# Patient Record
Sex: FEMALE | Race: WHITE | NOT HISPANIC OR LATINO | Employment: FULL TIME | URBAN - METROPOLITAN AREA
[De-identification: names, ages, dates, MRNs, and addresses within clinical notes are randomized per-mention and may not be internally consistent; named-entity substitution may affect disease eponyms.]

---

## 2017-03-12 ENCOUNTER — APPOINTMENT (OUTPATIENT)
Dept: ULTRASOUND IMAGING | Facility: CLINIC | Age: 42
End: 2017-03-12
Attending: EMERGENCY MEDICINE
Payer: COMMERCIAL

## 2017-03-12 ENCOUNTER — HOSPITAL ENCOUNTER (OUTPATIENT)
Facility: CLINIC | Age: 42
Setting detail: OBSERVATION
Discharge: HOME | End: 2017-03-13
Attending: EMERGENCY MEDICINE | Admitting: FAMILY MEDICINE
Payer: COMMERCIAL

## 2017-03-12 ENCOUNTER — APPOINTMENT (OUTPATIENT)
Dept: CT IMAGING | Facility: CLINIC | Age: 42
End: 2017-03-12
Attending: EMERGENCY MEDICINE
Payer: COMMERCIAL

## 2017-03-12 DIAGNOSIS — R11.2 INTRACTABLE VOMITING WITH NAUSEA, UNSPECIFIED VOMITING TYPE: ICD-10-CM

## 2017-03-12 DIAGNOSIS — A08.4 VIRAL GASTROENTERITIS: Primary | ICD-10-CM

## 2017-03-12 DIAGNOSIS — A41.9 SEPSIS, DUE TO UNSPECIFIED ORGANISM: ICD-10-CM

## 2017-03-12 DIAGNOSIS — K80.20 CALCULUS OF GALLBLADDER WITHOUT CHOLECYSTITIS WITHOUT OBSTRUCTION: ICD-10-CM

## 2017-03-12 DIAGNOSIS — R50.9 FEVER, UNSPECIFIED: ICD-10-CM

## 2017-03-12 LAB
ALBUMIN SERPL-MCNC: 3.7 G/DL (ref 3.4–5)
ALBUMIN UR-MCNC: 10 MG/DL
ALP SERPL-CCNC: 77 U/L (ref 40–150)
ALT SERPL W P-5'-P-CCNC: 31 U/L (ref 0–50)
ANION GAP SERPL CALCULATED.3IONS-SCNC: 8 MMOL/L (ref 3–14)
APPEARANCE UR: CLEAR
AST SERPL W P-5'-P-CCNC: 16 U/L (ref 0–45)
BACTERIA #/AREA URNS HPF: ABNORMAL /HPF
BASOPHILS # BLD AUTO: 0 10E9/L (ref 0–0.2)
BASOPHILS NFR BLD AUTO: 0.1 %
BILIRUB DIRECT SERPL-MCNC: 0.1 MG/DL (ref 0–0.2)
BILIRUB SERPL-MCNC: 1.2 MG/DL (ref 0.2–1.3)
BILIRUB UR QL STRIP: NEGATIVE
BUN SERPL-MCNC: 18 MG/DL (ref 7–30)
CALCIUM SERPL-MCNC: 8.6 MG/DL (ref 8.5–10.1)
CHLORIDE SERPL-SCNC: 103 MMOL/L (ref 94–109)
CO2 SERPL-SCNC: 25 MMOL/L (ref 20–32)
COLOR UR AUTO: YELLOW
CREAT SERPL-MCNC: 0.56 MG/DL (ref 0.52–1.04)
DIFFERENTIAL METHOD BLD: ABNORMAL
EOSINOPHIL # BLD AUTO: 0.1 10E9/L (ref 0–0.7)
EOSINOPHIL NFR BLD AUTO: 0.4 %
ERYTHROCYTE [DISTWIDTH] IN BLOOD BY AUTOMATED COUNT: 12.6 % (ref 10–15)
GFR SERPL CREATININE-BSD FRML MDRD: ABNORMAL ML/MIN/1.7M2
GLUCOSE SERPL-MCNC: 112 MG/DL (ref 70–99)
GLUCOSE UR STRIP-MCNC: NEGATIVE MG/DL
HCT VFR BLD AUTO: 47 % (ref 35–47)
HGB BLD-MCNC: 15.4 G/DL (ref 11.7–15.7)
HGB UR QL STRIP: NEGATIVE
IMM GRANULOCYTES # BLD: 0 10E9/L (ref 0–0.4)
IMM GRANULOCYTES NFR BLD: 0.2 %
KETONES UR STRIP-MCNC: NEGATIVE MG/DL
LACTATE BLD-SCNC: 2.5 MMOL/L (ref 0.7–2.1)
LACTATE BLD-SCNC: 2.7 MMOL/L (ref 0.7–2.1)
LEUKOCYTE ESTERASE UR QL STRIP: ABNORMAL
LIPASE SERPL-CCNC: 82 U/L (ref 73–393)
LYMPHOCYTES # BLD AUTO: 0.3 10E9/L (ref 0.8–5.3)
LYMPHOCYTES NFR BLD AUTO: 2.6 %
MCH RBC QN AUTO: 28.3 PG (ref 26.5–33)
MCHC RBC AUTO-ENTMCNC: 32.8 G/DL (ref 31.5–36.5)
MCV RBC AUTO: 86 FL (ref 78–100)
MONOCYTES # BLD AUTO: 0.3 10E9/L (ref 0–1.3)
MONOCYTES NFR BLD AUTO: 2.5 %
MUCOUS THREADS #/AREA URNS LPF: PRESENT /LPF
NEUTROPHILS # BLD AUTO: 10.8 10E9/L (ref 1.6–8.3)
NEUTROPHILS NFR BLD AUTO: 94.2 %
NITRATE UR QL: NEGATIVE
PH UR STRIP: 6 PH (ref 5–7)
PLATELET # BLD AUTO: 171 10E9/L (ref 150–450)
POTASSIUM SERPL-SCNC: 4 MMOL/L (ref 3.4–5.3)
PROT SERPL-MCNC: 7.3 G/DL (ref 6.8–8.8)
RBC # BLD AUTO: 5.45 10E12/L (ref 3.8–5.2)
RBC #/AREA URNS AUTO: 1 /HPF (ref 0–2)
SODIUM SERPL-SCNC: 136 MMOL/L (ref 133–144)
SP GR UR STRIP: 1.02 (ref 1–1.03)
SQUAMOUS #/AREA URNS AUTO: 7 /HPF (ref 0–1)
URN SPEC COLLECT METH UR: ABNORMAL
UROBILINOGEN UR STRIP-MCNC: NORMAL MG/DL (ref 0–2)
WBC # BLD AUTO: 11.4 10E9/L (ref 4–11)
WBC #/AREA URNS AUTO: 1 /HPF (ref 0–2)

## 2017-03-12 PROCEDURE — 96374 THER/PROPH/DIAG INJ IV PUSH: CPT

## 2017-03-12 PROCEDURE — 25000128 H RX IP 250 OP 636: Performed by: EMERGENCY MEDICINE

## 2017-03-12 PROCEDURE — 81001 URINALYSIS AUTO W/SCOPE: CPT | Performed by: EMERGENCY MEDICINE

## 2017-03-12 PROCEDURE — 25800025 ZZH RX 258: Performed by: EMERGENCY MEDICINE

## 2017-03-12 PROCEDURE — 96375 TX/PRO/DX INJ NEW DRUG ADDON: CPT

## 2017-03-12 PROCEDURE — 96376 TX/PRO/DX INJ SAME DRUG ADON: CPT

## 2017-03-12 PROCEDURE — 85025 COMPLETE CBC W/AUTO DIFF WBC: CPT | Performed by: FAMILY MEDICINE

## 2017-03-12 PROCEDURE — 25000128 H RX IP 250 OP 636: Performed by: FAMILY MEDICINE

## 2017-03-12 PROCEDURE — 99285 EMERGENCY DEPT VISIT HI MDM: CPT | Mod: 25

## 2017-03-12 PROCEDURE — 74176 CT ABD & PELVIS W/O CONTRAST: CPT

## 2017-03-12 PROCEDURE — 83605 ASSAY OF LACTIC ACID: CPT | Performed by: EMERGENCY MEDICINE

## 2017-03-12 PROCEDURE — 76705 ECHO EXAM OF ABDOMEN: CPT

## 2017-03-12 PROCEDURE — 96361 HYDRATE IV INFUSION ADD-ON: CPT

## 2017-03-12 PROCEDURE — 83690 ASSAY OF LIPASE: CPT | Performed by: FAMILY MEDICINE

## 2017-03-12 PROCEDURE — 80048 BASIC METABOLIC PNL TOTAL CA: CPT | Performed by: FAMILY MEDICINE

## 2017-03-12 PROCEDURE — 99285 EMERGENCY DEPT VISIT HI MDM: CPT | Performed by: EMERGENCY MEDICINE

## 2017-03-12 PROCEDURE — 80076 HEPATIC FUNCTION PANEL: CPT | Performed by: FAMILY MEDICINE

## 2017-03-12 RX ORDER — PROMETHAZINE HYDROCHLORIDE 25 MG/ML
25 INJECTION, SOLUTION INTRAMUSCULAR; INTRAVENOUS ONCE
Status: COMPLETED | OUTPATIENT
Start: 2017-03-12 | End: 2017-03-12

## 2017-03-12 RX ORDER — ONDANSETRON 2 MG/ML
8 INJECTION INTRAMUSCULAR; INTRAVENOUS ONCE
Status: COMPLETED | OUTPATIENT
Start: 2017-03-12 | End: 2017-03-12

## 2017-03-12 RX ORDER — ONDANSETRON 2 MG/ML
4 INJECTION INTRAMUSCULAR; INTRAVENOUS ONCE
Status: COMPLETED | OUTPATIENT
Start: 2017-03-12 | End: 2017-03-12

## 2017-03-12 RX ORDER — KETOROLAC TROMETHAMINE 30 MG/ML
30 INJECTION, SOLUTION INTRAMUSCULAR; INTRAVENOUS ONCE
Status: COMPLETED | OUTPATIENT
Start: 2017-03-12 | End: 2017-03-12

## 2017-03-12 RX ADMIN — KETOROLAC TROMETHAMINE 30 MG: 30 INJECTION, SOLUTION INTRAMUSCULAR at 20:07

## 2017-03-12 RX ADMIN — SODIUM CHLORIDE 1000 ML: 9 INJECTION, SOLUTION INTRAVENOUS at 19:01

## 2017-03-12 RX ADMIN — ONDANSETRON 8 MG: 2 INJECTION INTRAMUSCULAR; INTRAVENOUS at 22:15

## 2017-03-12 RX ADMIN — SODIUM CHLORIDE 1000 ML: 9 INJECTION, SOLUTION INTRAVENOUS at 20:05

## 2017-03-12 RX ADMIN — SODIUM CHLORIDE, POTASSIUM CHLORIDE, SODIUM LACTATE AND CALCIUM CHLORIDE 1000 ML: 600; 310; 30; 20 INJECTION, SOLUTION INTRAVENOUS at 21:45

## 2017-03-12 RX ADMIN — PROMETHAZINE HYDROCHLORIDE 25 MG: 25 INJECTION INTRAMUSCULAR; INTRAVENOUS at 20:08

## 2017-03-12 RX ADMIN — ONDANSETRON 4 MG: 2 INJECTION INTRAMUSCULAR; INTRAVENOUS at 19:00

## 2017-03-12 NOTE — IP AVS SNAPSHOT
[facility]    [address] [address]    Phone:  [phone]    Fax:  [phone]                                       After Visit Summary   3/12/2017    Anne Cortez    MRN: [id number]           After Visit Summary Signature Page     I have received my discharge instructions, and my questions have been answered. I have discussed any challenges I see with this plan with the nurse or doctor.    ..........................................................................................................................................  Patient/Patient Representative Signature      ..........................................................................................................................................  Patient Representative Print Name and Relationship to Patient    ..................................................               ................................................  Date                                            Time    ..........................................................................................................................................  Reviewed by Signature/Title    ...................................................              ..............................................  Date                                                            Time

## 2017-03-12 NOTE — IP AVS SNAPSHOT
MRN:[id number]                      After Visit Summary   3/12/2017    Anne Cortez    MRN: [id number]           Thank you!     Thank you for choosing [facility] for your care. Our goal is always to provide you with excellent care. Hearing back from our patients is one way we can continue to improve our services. Please take a few minutes to complete the written survey that you may receive in the mail after you visit with us. Thank you!        Patient Information     Date Of Birth          1975        About your hospital stay     You were admitted on:  March 13, 2017 You last received care in the:  [facility]    You were discharged on:  March 13, 2017        Reason for your hospital stay       Suspected viral gastroenteritis and need for hydration and anti-nausea medications                  Who to Call     For medical emergencies, please call 911.  For non-urgent questions about your medical care, please call your primary care provider or clinic, [phone]          Attending Provider     Provider Specialty    Jose Singh MD Emergency Medicine    [facility] SHELLEY MD [facility]    ChristianJaya thomas MD Internal Medicine       Primary Care Provider Office Phone # Fax #    Yrn Lopez [phone]       [address] [address]        After Care Instructions     Activity       Your activity upon discharge: activity as tolerated            Diet       Follow this diet upon discharge: Advance diet as tolerated, easing into regular diet                  Follow-up Appointments     Follow-up and recommended labs and tests        Follow up with primary care provider, Yrn Lopez, within 7 days for hospital follow- up. Repeat CBC for platelet count.                  Additional Services     GENERAL SURG ADULT REFERRAL       Your provider has referred you to: PREFERRED PROVIDERS:  [facility]: [facility]  "[facility] [phone]   [email or web address]    Please be aware that coverage of these services is subject to the terms and limitations of your health insurance plan.  Call member services at your health plan with any benefit or coverage questions.      Please bring the following with you to your appointment:    (1) Any X-Rays, CTs or MRIs which have been performed.  Contact the facility where they were done to arrange for  prior to your scheduled appointment.   (2) List of current medications   (3) This referral request   (4) Any documents/labs given to you for this referral                  Further instructions from your care team       Please continue to drink plenty of fluids and use OTC or prescription anti-nausea (compazine) to control nausea.     Please follow-up with PCP within 7 days for hospital follow-up and to recheck platelet count. Seek care urgently if nausea is uncontrollable or unable to keep fluids down.    Please follow-up with general surgery for consultation on gall stone.    Pending Results     No orders found for last 3 day(s).            Statement of Approval     Ordered          03/13/17 1516  I have reviewed and agree with all the recommendations and orders detailed in this document.  EFFECTIVE NOW     Approved and electronically signed by:  Kathrine Flynn PA-C             Admission Information     Date & Time Provider Department Dept. Phone    3/12/2017 Jaya Christian MD [facility] [phone]      Your Vitals Were     Blood Pressure Pulse Temperature Respirations Height Weight    124/71 (BP Location: Left arm) 89 98.3  F (36.8  C) (Oral) 18 1.727 m (5' 8\") 156.5 kg (345 lb 0.3 oz)    Pulse Oximetry BMI (Body Mass Index)                97% 52.46 kg/m2          MyChart Information     [facility] gives you secure access to your electronic health record. If you see a primary care provider, you can also send messages to your care " team and make appointments. If you have questions, please call your primary care clinic.  If you do not have a primary care provider, please call [phone] and they will assist you.        Care EveryWhere ID     This is your Care EveryWhere ID. This could be used by other organizations to access your [facility] medical records  [id number]           Review of your medicines      START taking        Dose / Directions    prochlorperazine 5 MG tablet   Commonly known as:  COMPAZINE   Used for:  Intractable vomiting with nausea, unspecified vomiting type, Viral gastroenteritis        Dose:  5-10 mg   Take 1-2 tablets (5-10 mg) by mouth every 6 hours as needed for nausea or vomiting   Quantity:  20 tablet   Refills:  0         CONTINUE these medicines which have NOT CHANGED        Dose / Directions    fluticasone 50 MCG/ACT spray   Commonly known as:  FLONASE   Used for:  Chronic nasal congestion        Dose:  1-2 spray   Spray 1-2 sprays into both nostrils daily as needed for rhinitis   Quantity:  1 Package   Refills:  11       levonorgestrel 20 MCG/24HR IUD   Commonly known as:  MIRENA        Dose:  1 each   1 each by Intrauterine route once   Refills:  0       VITAMIN D PO        Dose:  4000 Units   Take 4,000 Units by mouth daily   Refills:  0            Where to get your medicines      These medications were sent to [facility] [address]  [address] [address]     Phone:  [phone]     prochlorperazine 5 MG tablet                Protect others around you: Learn how to safely use, store and throw away your medicines at www.disposemymeds.org.             Medication List: This is a list of all your medications and when to take them. Check marks below indicate your daily home schedule. Keep this list as a reference.      Medications           Morning Afternoon Evening Bedtime As Needed    fluticasone 50 MCG/ACT spray   Commonly known as:  FLONASE   Spray 1-2  sprays into both nostrils daily as needed for rhinitis                                levonorgestrel 20 MCG/24HR IUD   Commonly known as:  MIRENA   1 each by Intrauterine route once                                prochlorperazine 5 MG tablet   Commonly known as:  COMPAZINE   Take 1-2 tablets (5-10 mg) by mouth every 6 hours as needed for nausea or vomiting                                VITAMIN D PO   Take 4,000 Units by mouth daily

## 2017-03-13 VITALS
TEMPERATURE: 98.3 F | OXYGEN SATURATION: 97 % | DIASTOLIC BLOOD PRESSURE: 71 MMHG | RESPIRATION RATE: 18 BRPM | SYSTOLIC BLOOD PRESSURE: 124 MMHG | WEIGHT: 293 LBS | HEIGHT: 68 IN | BODY MASS INDEX: 44.41 KG/M2 | HEART RATE: 89 BPM

## 2017-03-13 PROBLEM — R11.10 INTRACTABLE VOMITING: Status: ACTIVE | Noted: 2017-03-13

## 2017-03-13 LAB
ANION GAP SERPL CALCULATED.3IONS-SCNC: 7 MMOL/L (ref 3–14)
BUN SERPL-MCNC: 14 MG/DL (ref 7–30)
CALCIUM SERPL-MCNC: 7.7 MG/DL (ref 8.5–10.1)
CHLORIDE SERPL-SCNC: 111 MMOL/L (ref 94–109)
CO2 SERPL-SCNC: 24 MMOL/L (ref 20–32)
CREAT SERPL-MCNC: 0.6 MG/DL (ref 0.52–1.04)
ERYTHROCYTE [DISTWIDTH] IN BLOOD BY AUTOMATED COUNT: 12.8 % (ref 10–15)
FLUAV+FLUBV AG SPEC QL: NEGATIVE
FLUAV+FLUBV AG SPEC QL: NORMAL
GFR SERPL CREATININE-BSD FRML MDRD: ABNORMAL ML/MIN/1.7M2
GLUCOSE SERPL-MCNC: 108 MG/DL (ref 70–99)
HCT VFR BLD AUTO: 40.4 % (ref 35–47)
HGB BLD-MCNC: 12.9 G/DL (ref 11.7–15.7)
LACTATE BLD-SCNC: 1.4 MMOL/L (ref 0.7–2.1)
MCH RBC QN AUTO: 28 PG (ref 26.5–33)
MCHC RBC AUTO-ENTMCNC: 31.9 G/DL (ref 31.5–36.5)
MCV RBC AUTO: 88 FL (ref 78–100)
PLATELET # BLD AUTO: 126 10E9/L (ref 150–450)
POTASSIUM SERPL-SCNC: 3.6 MMOL/L (ref 3.4–5.3)
RBC # BLD AUTO: 4.61 10E12/L (ref 3.8–5.2)
SODIUM SERPL-SCNC: 142 MMOL/L (ref 133–144)
SPECIMEN SOURCE: NORMAL
WBC # BLD AUTO: 5.2 10E9/L (ref 4–11)

## 2017-03-13 PROCEDURE — 80048 BASIC METABOLIC PNL TOTAL CA: CPT | Performed by: PHYSICIAN ASSISTANT

## 2017-03-13 PROCEDURE — G0378 HOSPITAL OBSERVATION PER HR: HCPCS

## 2017-03-13 PROCEDURE — 87804 INFLUENZA ASSAY W/OPTIC: CPT | Performed by: EMERGENCY MEDICINE

## 2017-03-13 PROCEDURE — 25000128 H RX IP 250 OP 636: Performed by: FAMILY MEDICINE

## 2017-03-13 PROCEDURE — 36415 COLL VENOUS BLD VENIPUNCTURE: CPT | Performed by: FAMILY MEDICINE

## 2017-03-13 PROCEDURE — 99235 HOSP IP/OBS SAME DATE MOD 70: CPT | Performed by: PHYSICIAN ASSISTANT

## 2017-03-13 PROCEDURE — 85027 COMPLETE CBC AUTOMATED: CPT | Performed by: PHYSICIAN ASSISTANT

## 2017-03-13 PROCEDURE — 36415 COLL VENOUS BLD VENIPUNCTURE: CPT | Performed by: PHYSICIAN ASSISTANT

## 2017-03-13 PROCEDURE — 25800025 ZZH RX 258: Performed by: EMERGENCY MEDICINE

## 2017-03-13 PROCEDURE — 83605 ASSAY OF LACTIC ACID: CPT | Performed by: FAMILY MEDICINE

## 2017-03-13 PROCEDURE — 96376 TX/PRO/DX INJ SAME DRUG ADON: CPT

## 2017-03-13 PROCEDURE — 96375 TX/PRO/DX INJ NEW DRUG ADDON: CPT

## 2017-03-13 RX ORDER — ONDANSETRON 2 MG/ML
4 INJECTION INTRAMUSCULAR; INTRAVENOUS EVERY 6 HOURS PRN
Status: DISCONTINUED | OUTPATIENT
Start: 2017-03-13 | End: 2017-03-13 | Stop reason: HOSPADM

## 2017-03-13 RX ORDER — PROCHLORPERAZINE MALEATE 5 MG
5-10 TABLET ORAL EVERY 6 HOURS PRN
Qty: 20 TABLET | Refills: 0 | Status: SHIPPED | OUTPATIENT
Start: 2017-03-13 | End: 2017-03-18

## 2017-03-13 RX ORDER — PROCHLORPERAZINE 25 MG
25 SUPPOSITORY, RECTAL RECTAL EVERY 12 HOURS PRN
Status: DISCONTINUED | OUTPATIENT
Start: 2017-03-13 | End: 2017-03-13 | Stop reason: HOSPADM

## 2017-03-13 RX ORDER — NALOXONE HYDROCHLORIDE 0.4 MG/ML
.1-.4 INJECTION, SOLUTION INTRAMUSCULAR; INTRAVENOUS; SUBCUTANEOUS
Status: DISCONTINUED | OUTPATIENT
Start: 2017-03-13 | End: 2017-03-13 | Stop reason: HOSPADM

## 2017-03-13 RX ORDER — ONDANSETRON 4 MG/1
4 TABLET, ORALLY DISINTEGRATING ORAL EVERY 6 HOURS PRN
Status: DISCONTINUED | OUTPATIENT
Start: 2017-03-13 | End: 2017-03-13 | Stop reason: HOSPADM

## 2017-03-13 RX ORDER — METOCLOPRAMIDE HYDROCHLORIDE 5 MG/ML
10 INJECTION INTRAMUSCULAR; INTRAVENOUS EVERY 6 HOURS PRN
Status: DISCONTINUED | OUTPATIENT
Start: 2017-03-13 | End: 2017-03-13 | Stop reason: HOSPADM

## 2017-03-13 RX ORDER — ONDANSETRON 4 MG/1
4 TABLET, ORALLY DISINTEGRATING ORAL EVERY 6 HOURS PRN
Status: DISCONTINUED | OUTPATIENT
Start: 2017-03-13 | End: 2017-03-13

## 2017-03-13 RX ORDER — KETOROLAC TROMETHAMINE 30 MG/ML
30 INJECTION, SOLUTION INTRAMUSCULAR; INTRAVENOUS EVERY 6 HOURS PRN
Status: DISCONTINUED | OUTPATIENT
Start: 2017-03-13 | End: 2017-03-13 | Stop reason: HOSPADM

## 2017-03-13 RX ORDER — PROCHLORPERAZINE MALEATE 5 MG
5-10 TABLET ORAL EVERY 6 HOURS PRN
Status: DISCONTINUED | OUTPATIENT
Start: 2017-03-13 | End: 2017-03-13 | Stop reason: HOSPADM

## 2017-03-13 RX ORDER — ONDANSETRON 2 MG/ML
4 INJECTION INTRAMUSCULAR; INTRAVENOUS EVERY 6 HOURS PRN
Status: DISCONTINUED | OUTPATIENT
Start: 2017-03-13 | End: 2017-03-13

## 2017-03-13 RX ORDER — METOCLOPRAMIDE 10 MG/1
10 TABLET ORAL EVERY 6 HOURS PRN
Status: DISCONTINUED | OUTPATIENT
Start: 2017-03-13 | End: 2017-03-13 | Stop reason: HOSPADM

## 2017-03-13 RX ORDER — SODIUM CHLORIDE, SODIUM LACTATE, POTASSIUM CHLORIDE, CALCIUM CHLORIDE 600; 310; 30; 20 MG/100ML; MG/100ML; MG/100ML; MG/100ML
INJECTION, SOLUTION INTRAVENOUS CONTINUOUS
Status: DISCONTINUED | OUTPATIENT
Start: 2017-03-13 | End: 2017-03-13 | Stop reason: HOSPADM

## 2017-03-13 RX ADMIN — PROCHLORPERAZINE EDISYLATE 5 MG: 5 INJECTION INTRAMUSCULAR; INTRAVENOUS at 09:31

## 2017-03-13 RX ADMIN — SODIUM CHLORIDE, POTASSIUM CHLORIDE, SODIUM LACTATE AND CALCIUM CHLORIDE: 600; 310; 30; 20 INJECTION, SOLUTION INTRAVENOUS at 07:10

## 2017-03-13 RX ADMIN — SODIUM CHLORIDE, POTASSIUM CHLORIDE, SODIUM LACTATE AND CALCIUM CHLORIDE: 600; 310; 30; 20 INJECTION, SOLUTION INTRAVENOUS at 02:14

## 2017-03-13 RX ADMIN — SODIUM CHLORIDE, POTASSIUM CHLORIDE, SODIUM LACTATE AND CALCIUM CHLORIDE 1000 ML: 600; 310; 30; 20 INJECTION, SOLUTION INTRAVENOUS at 00:25

## 2017-03-13 RX ADMIN — PROCHLORPERAZINE EDISYLATE 10 MG: 5 INJECTION INTRAMUSCULAR; INTRAVENOUS at 02:14

## 2017-03-13 RX ADMIN — KETOROLAC TROMETHAMINE 30 MG: 30 INJECTION, SOLUTION INTRAMUSCULAR at 02:14

## 2017-03-13 RX ADMIN — SODIUM CHLORIDE, POTASSIUM CHLORIDE, SODIUM LACTATE AND CALCIUM CHLORIDE: 600; 310; 30; 20 INJECTION, SOLUTION INTRAVENOUS at 12:06

## 2017-03-13 RX ADMIN — KETOROLAC TROMETHAMINE 30 MG: 30 INJECTION, SOLUTION INTRAMUSCULAR at 09:28

## 2017-03-13 NOTE — ED NOTES
Pt will be admitted. Report to Ruth URBANO. 4th liter IVF hung, will redraw lactic after approx 500 mL of 4th liter. Pt has voided once since arrival to ED.

## 2017-03-13 NOTE — ED NOTES
Patient has  [place] to Observation  order. Patient has been given Patient Bill of Rights, Observation brochure and  What does Observation mean to me  forms.  Patient has been given the opportunity to ask questions about observation status and their plan of care.      SAIGE MESA
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
## 2017-03-13 NOTE — PLAN OF CARE
Problem: Goal Outcome Summary  Goal: Goal Outcome Summary  Outcome: Improving  Patient resting better following compazine and Toradol doses.  Denies nausea and pain presently.  2 small watery stools noted.  Abdomen soft with active bowels sounds.  Tolerating ice chips and sips of water.  Will keep on clear liquid diet for breakfast.

## 2017-03-13 NOTE — DISCHARGE INSTRUCTIONS
Please continue to drink plenty of fluids and use OTC or prescription anti-nausea (compazine) to control nausea.     Please follow-up with PCP within 7 days for hospital follow-up and to recheck platelet count. Seek care urgently if nausea is uncontrollable or unable to keep fluids down.    Please follow-up with general surgery for consultation on gall stone.

## 2017-03-13 NOTE — ED NOTES
Pt had another emesis after returning from [place]. Additional meds given. States pain still is much improved.

## 2017-03-13 NOTE — DISCHARGE SUMMARY
[facility]    Discharge Summary  Hospital Medicine    Date of Admission:  3/12/2017  Date of Discharge:  3/13/2017   Discharging Provider: Kathrine Flynn  Date of Service: 3/13/2017     Primary Care     Yrn Lopez  [place] [address] [facility] [address]      Identification and Chief Compaint: Anne Cortez is a 41 year old female with obesity, dyslipidemia, severe dysplasia of cervix, depression in complete remission  who presented on 3/12/2017 with complaint of nausea and emesis..    Discharge Diagnoses     Primary Diagnoses  Acute Viral Gastroenteritis    Secondary Diagnoses  Elevated Lactate - Resolved  Thrombocytopenia  Cholelithiasis   Liver Steatosis  Morbid Obesity    Discharge Disposition   Discharged to home    Discharge Orders     GENERAL SURG ADULT REFERRAL     Reason for your hospital stay   Suspected viral gastroenteritis and need for hydration and anti-nausea medications     Follow-up and recommended labs and tests    Follow up with primary care provider, Yrn Lopez, within 7 days for hospital follow- up. Repeat CBC for platelet count.     Activity   Your activity upon discharge: activity as tolerated     Full Code     Diet   Follow this diet upon discharge: Advance diet as tolerated, easing into regular diet       Discharge Medications   Current Discharge Medication List      START taking these medications    Details   prochlorperazine (COMPAZINE) 5 MG tablet Take 1-2 tablets (5-10 mg) by mouth every 6 hours as needed for nausea or vomiting  Qty: 20 tablet, Refills: 0    Associated Diagnoses: Intractable vomiting with nausea, unspecified vomiting type; Viral gastroenteritis         CONTINUE these medications which have NOT CHANGED    Details   levonorgestrel (MIRENA) 20 MCG/24HR IUD 1 each by Intrauterine route once      Cholecalciferol (VITAMIN D PO) Take 4,000 Units by mouth daily       fluticasone (FLONASE) 50 MCG/ACT nasal  spray Spray 1-2 sprays into both nostrils daily as needed for rhinitis  Qty: 1 Package, Refills: 11    Associated Diagnoses: Chronic nasal congestion           Allergies   Allergies   Allergen Reactions     Bactrim [Sulfamethoxazole W/Trimethoprim] Rash     Consultations This Hospital Stay    None    Significant Results and Procedures   Procedures    None    Data   Results for orders placed or performed during the hospital encounter of 03/12/17   Abd/pelvis CT - no contrast - Stone Protocol    Narrative    CT ABDOMEN AND PELVIS WITHOUT CONTRAST   3/12/2017 10:38 PM     HISTORY: Pain and fever.    TECHNIQUE: Noncontrast CT abdomen and pelvis was performed. Radiation  dose for this scan was reduced using automated exposure control,  adjustment of the mA and/or kV according to patient size, or iterative  reconstruction technique.    COMPARISON: None.    FINDINGS: Large gallstone at the central gallbladder in the region of  the gallbladder neck. This stone is 2.4 cm image 30. There may be a  degree of fatty infiltration of the liver. The spleen, pancreas,  adrenals, and kidneys show no acute abnormalities. No hydronephrosis.  No ureter stone. No acute bowel finding. Normal appendix. No evidence  for abscess. IUD in the uterus in appropriate position.      Impression    IMPRESSION:  1. Large gallstone at the gallbladder neck. Correlate clinically with  any evidence for cholecystitis. If indicated, ultrasound could provide  further assessment.  2. Fatty liver.    LIMA CELESTE MD   Abdomen US, limited (RUQ only)    Narrative    ULTRASOUND ABDOMEN LIMITED RIGHT UPPER QUADRANT   3/13/2017 12:15 AM     HISTORY: Pain and vomiting. Gallstone on CT.    COMPARISON: 3/12/2017 - CT abdomen and pelvis.    FINDINGS: Moderate diffuse increased hepatic echogenicity, likely  representing fatty infiltration. No hepatic masses. A 3 cm mobile  gallstone is present in the gallbladder. No gallbladder wall  thickening, pericholecystic fluid,  "or focal tenderness over the  gallbladder. No intrahepatic biliary dilatation. The common duct is  not well-visualized, but may be mildly dilated, measuring up to 0.8 cm  in diameter. No intrahepatic biliary dilatation. The right kidney has  normal size and echogenicity, measuring 11.3 cm in length. No right  intrarenal collecting system dilatation, calculi or masses. No free  fluid in the upper right hemiabdomen.      Impression    IMPRESSION:   1. 3 cm mobile gallstone in the gallbladder. The gallbladder is  otherwise unremarkable in appearance.  2. Equivocal mild dilatation of the common duct, which measures up to  0.8 cm in diameter. No intrahepatic biliary dilatation.  3. Moderate diffuse fatty infiltration of the liver.    ELIDA MCCARTY MD     History of Present Illness   (From H&P) \"Anne Cortez is a 41 year old female who presents with nausea with emesis     Patient presents with 2 day history nausea with emesis. Has associated fever (104.1 orally) at home, chills, myalgias and vague abdominal discomfort which she feels is likely due to emesis but did feel upset Saturday evening prior to emesis as well. Difficult to keep anything down yesterday which resulted in decreased PO intake. Non-bloody diarrhea noted this AM x2. Denies any sick contacts or eating anything unusual yesterday. Traveled to [place] last week. No antibiotic use.      Did have bilateral frontal and occipital headache which was dull in nature and has resolved with IVF and Toradol. She also had lightheadedness upon standing but believes has improved.  Denies any other cold-like symptoms (congestion, pharyngitis, sinus pressure, rhinorrhea), CP, SOB, cough, dysuria, hematuria, hematochezia, melena, headaches, dizziness, joint swelling, joint pain, leg swelling, rashes.\"    Hospital Course   Anne Cortez was admitted on 3/12/2017.  The following problems were addressed during her hospitalization:    Acute Viral " Gastroenteritis  Presented with nausea with emesis and now non-bloody diarrhea. Febrile at home (104.1) and mildly febrile here (100.3) with mild leukocytosis (11.4) which has resolved with IVF. CT scan was remarkable for large gallstone in gallbladder neck. RUQ US noted 3cm mobile gallstone in the gallbladder, equivocal mild dilatation of common duct. While cholelithiasis noted on CT scan, do not feel causing symptoms as without RUQ tenderness or biliary colic like symptoms. Patient greatly improved with IVF and was tolerating po intake well, was without lightheadedness upon standing and nausea on discharge. Patient is to follow-up with PCP within 7 days for hospital follow-up she will be given small amount of compazine for any returning nausea.      Elevated Lactate  Lactic 2.5, which improved with fluids. Likely due to hypovolemia and dehydration.      Thrombocytopenia  Platelets 171->126 with IVF. Likely dilutional. Will have PCP recheck on follow-up     Cholelithiasis   CT showed gallstone in gallbladder neck with RUQ US noting 3cm mobile stone. No evidence of cholecystitis. Referral placed for general surgery for OP consultation. Discussed likely not causing symptoms but likely in the future to cause problems.     Liver Steatosis   Noted on CT and RUQ. Likely due to obesity     Morbid Obesity   BMI 52.46. Makes physical examination challenging at times and likely contributing to dyslipidemia and fatty liver.     Pending Results   Unresulted Labs Ordered in the Past 30 Days of this Admission     No orders found for last 61 day(s).        Physical Exam   Temp:  [98.3  F (36.8  C)-100.3  F (37.9  C)] 98.3  F (36.8  C)  Pulse:  [] 89  Heart Rate:  [] 83  Resp:  [16-20] 18  BP: ()/(52-85) 124/71  SpO2:  [97 %-98 %] 97 %  Vitals:    03/12/17 1827 03/13/17 0104   Weight: (!) 149.7 kg (330 lb) (!) 156.5 kg (345 lb 0.3 oz)     Constitutional: Alert, oriented, cooperative, no apparent distress, appears  nontoxic, Appears stated age.  Eyes: Sclera are anicteric, EOMI.  HENT: Normocephalic. Atraumatic. MMM  Lymph/Hematologic: No preauricular, postauricular, occipital, sub-mandibular, tonsillar, sub-mental, anterior or posterior cervical, or supraclavicular lymphadenopathy is appreciated.  Cardiovascular: Regular rate and rhythm, heart sounds distant. Radial pulses are 2+ bilaterally. Distal pulses are intact. No lower extremity edema.  Respiratory: No accessory muscle usage. Speaking in full sentences. Clear to auscultation bilaterally without wheezes, crackles or rhonchi.   GI:Normal bowel sounds present, soft, non-tender,non-distended. No rebound or guarding.  Genitourinary: Deferred  Musculoskeletal: Normal muscle bulk and tone. Moves all extremities appropriately.  Skin: Warm and dry, no rashes.   Neurologic: Neck supple. Cranial nerves 3-12 are grossly intact.     The discharge plan was discussed with the patient and sig other.    Total time on this discharge was 35min.    I have discussed patient and formulated plan with Dr. Christian. Assessment and plan as above.     Kathrine Fylnn [facility]

## 2017-03-13 NOTE — ED NOTES
Patient presents with intractable N&V since this AM today.  Pt states her husbands family members are presently sick.  Pt has been unable to eat anything and keep it down.

## 2017-03-13 NOTE — PROGRESS NOTES
[facility] DISCHARGE NOTE    Patient discharged to home at 4:21 PM via ambulation. Accompanied by spouse and staff. Discharge instructions reviewed with patient, opportunity offered to ask questions. Prescriptions filled and sent with patient upon discharge. All belongings sent with patient.    Kim Velasquez

## 2017-03-13 NOTE — PROGRESS NOTES
[place]    Patient admitted to room 2403 at approximately 0050 via cart from emergency room. Patient was accompanied by significant other and transport tech.     Verbal SBAR report received from [place] prior to patient arrival.     Patient ambulated to bed with stand-by assist. Patient alert and oriented X 3. Pain is controlled with current analgesics.  Medication(s) being used: toradol. 0-10 Pain Scale: 4. Admission vital signs: Blood pressure 106/52, pulse 90, temperature 99.7  F (37.6  C), temperature source Oral, resp. rate 18, height 1.727 m (5' 8\"), weight (!) 156.5 kg (345 lb 0.3 oz), SpO2 98 %, not currently breastfeeding. Patient was oriented to plan of care, call light, bed controls, tv, telephone, bathroom and visiting hours.     The following safety risks were identified during admission: fall. Yellow risk band applied: NO.     Patient nauseated upon arrival to floor, small (10cc) yellow emesis noted.  Patient c/o diffuse abdominal pain and ache into back.  Call to MD on-call regarding pain and nausea medication need.      Rani Farias"

## 2017-03-13 NOTE — ED PROVIDER NOTES
"Chief complaint vomiting abdominal pain    41-year-old female no prior abdominal surgery presents with vomiting that began this morning, fever 104.7, abdominal cramping.  Soft brown bowel movement earlier today.  No ill contacts that she is aware of.  Did not get influenza vaccination.  Complains of global headache, denies focal neurologic change, complains of diffuse myalgias.  Denies cough chest pain or shortness of air.  Has urinated several times today.    Past medical history, medications, allergies, social history, family history all reviewed.  Patient Active Problem List   Diagnosis     Obesity     Severe dysplasia of cervix (NELSON III)     Major depression in complete remission (H)     CARDIOVASCULAR SCREENING; LDL GOAL LESS THAN 160     Vitamin D deficiency     Lifestyle     Dyslipidemia     Encounter for insertion of mirena IUD     ROS: All other systems reviewed and are negative.    /85  Pulse 109  Temp 99.2  F (37.3  C) (Oral)  Resp 20  Ht 1.727 m (5' 8\")  Wt (!) 149.7 kg (330 lb)  SpO2 98%  BMI 50.18 kg/m2  Nontoxic appearing no respiratory distress alert and oriented ×3  Head atraumatic, sclerae  Conjunctiva noninjected sclera nonicteric  Oropharynx moist without lesions  Lungs clear  Heart regular tachycardic no murmur  Abdomen soft obese mild diffuse tenderness mostly left sided without guarding or rebound  Skin pale warm and dry  Strength and sensation grossly intact throughout the extremities    Results for orders placed or performed during the hospital encounter of 03/12/17   Abd/pelvis CT - no contrast - Stone Protocol    Narrative    CT ABDOMEN AND PELVIS WITHOUT CONTRAST   3/12/2017 10:38 PM     HISTORY: Pain and fever.    TECHNIQUE: Noncontrast CT abdomen and pelvis was performed. Radiation  dose for this scan was reduced using automated exposure control,  adjustment of the mA and/or kV according to patient size, or iterative  reconstruction technique.    COMPARISON: None.    FINDINGS: " Large gallstone at the central gallbladder in the region of  the gallbladder neck. This stone is 2.4 cm image 30. There may be a  degree of fatty infiltration of the liver. The spleen, pancreas,  adrenals, and kidneys show no acute abnormalities. No hydronephrosis.  No ureter stone. No acute bowel finding. Normal appendix. No evidence  for abscess. IUD in the uterus in appropriate position.      Impression    IMPRESSION:  1. Large gallstone at the gallbladder neck. Correlate clinically with  any evidence for cholecystitis. If indicated, ultrasound could provide  further assessment.  2. Fatty liver.    LIMA CELESTE MD   CBC with platelets, differential   Result Value Ref Range    WBC 11.4 (H) 4.0 - 11.0 10e9/L    RBC Count 5.45 (H) 3.8 - 5.2 10e12/L    Hemoglobin 15.4 11.7 - 15.7 g/dL    Hematocrit 47.0 35.0 - 47.0 %    MCV 86 78 - 100 fl    MCH 28.3 26.5 - 33.0 pg    MCHC 32.8 31.5 - 36.5 g/dL    RDW 12.6 10.0 - 15.0 %    Platelet Count 171 150 - 450 10e9/L    Diff Method Automated Method     % Neutrophils 94.2 %    % Lymphocytes 2.6 %    % Monocytes 2.5 %    % Eosinophils 0.4 %    % Basophils 0.1 %    % Immature Granulocytes 0.2 %    Absolute Neutrophil 10.8 (H) 1.6 - 8.3 10e9/L    Absolute Lymphocytes 0.3 (L) 0.8 - 5.3 10e9/L    Absolute Monocytes 0.3 0.0 - 1.3 10e9/L    Absolute Eosinophils 0.1 0.0 - 0.7 10e9/L    Absolute Basophils 0.0 0.0 - 0.2 10e9/L    Abs Immature Granulocytes 0.0 0 - 0.4 10e9/L   Basic metabolic panel   Result Value Ref Range    Sodium 136 133 - 144 mmol/L    Potassium 4.0 3.4 - 5.3 mmol/L    Chloride 103 94 - 109 mmol/L    Carbon Dioxide 25 20 - 32 mmol/L    Anion Gap 8 3 - 14 mmol/L    Glucose 112 (H) 70 - 99 mg/dL    Urea Nitrogen 18 7 - 30 mg/dL    Creatinine 0.56 0.52 - 1.04 mg/dL    GFR Estimate >90  Non  GFR Calc   >60 mL/min/1.7m2    GFR Estimate If Black >90   GFR Calc   >60 mL/min/1.7m2    Calcium 8.6 8.5 - 10.1 mg/dL   Lactic acid whole blood    Result Value Ref Range    Lactic Acid 2.7 (H) 0.7 - 2.1 mmol/L   UA reflex to Microscopic   Result Value Ref Range    Color Urine Yellow     Appearance Urine Clear     Glucose Urine Negative NEG mg/dL    Bilirubin Urine Negative NEG    Ketones Urine Negative NEG mg/dL    Specific Gravity Urine 1.020 1.003 - 1.035    Blood Urine Negative NEG    pH Urine 6.0 5.0 - 7.0 pH    Protein Albumin Urine 10 (A) NEG mg/dL    Urobilinogen mg/dL Normal 0.0 - 2.0 mg/dL    Nitrite Urine Negative NEG    Leukocyte Esterase Urine Trace (A) NEG    Source Midstream Urine     RBC Urine 1 0 - 2 /HPF    WBC Urine 1 0 - 2 /HPF    Bacteria Urine Moderate (A) NEG /HPF    Squamous Epithelial /HPF Urine 7 (H) 0 - 1 /HPF    Mucous Urine Present (A) NEG /LPF   Hepatic panel   Result Value Ref Range    Bilirubin Direct 0.1 0.0 - 0.2 mg/dL    Bilirubin Total 1.2 0.2 - 1.3 mg/dL    Albumin 3.7 3.4 - 5.0 g/dL    Protein Total 7.3 6.8 - 8.8 g/dL    Alkaline Phosphatase 77 40 - 150 U/L    ALT 31 0 - 50 U/L    AST 16 0 - 45 U/L   Lipase   Result Value Ref Range    Lipase 82 73 - 393 U/L   Lactic acid whole blood   Result Value Ref Range    Lactic Acid 2.5 (H) 0.7 - 2.1 mmol/L     Medications   0.9% sodium chloride BOLUS (0 mLs Intravenous Stopped 3/12/17 2000)   ondansetron (ZOFRAN) injection 4 mg (4 mg Intravenous Given 3/12/17 1900)   promethazine (PHENERGAN) IV injection 25 mg (25 mg Intravenous Given 3/12/17 2008)   ketorolac (TORADOL) injection 30 mg (30 mg Intravenous Given 3/12/17 2007)   lactated ringers BOLUS 1,000 mL (1,000 mLs Intravenous New Bag 3/12/17 2145)   ondansetron (ZOFRAN) injection 8 mg (8 mg Intravenous Given 3/12/17 2215)   0.9% sodium chloride BOLUS (0 mLs Intravenous Stopped 3/12/17 2130)     MDM: 41-year-old female presents with headache, fever 104.7, intractable vomiting, abdominal pain.  Patient reevaluated multiple times during the stay in the emergency department.  She transiently responded to antiemetic therapy, Toradol  good relief of headache.  No meningismus, no altered mental status.  CT scan abdomen significant only for 2.5 cm gallstone, right upper quadrant sound unremarkable with exception of 2.5 cm gallstone.  Mild elevation of white count, lactate initially 2.7 down to 2.5 after 2.5 L crystalloid.  Symptoms consistent with gastroenteritis, although no diarrhea currently.  Patient has recurrent nausea despite several doses antiemetics.  Admit to observation for ongoing fluid, antiemetic therapy.  Reviewed with Fabiana Jennings who accepts for hospitalist service.    Impression: Vomiting, fever, sepsis.         Jose Singh MD  03/15/17 3074

## 2017-03-13 NOTE — ED NOTES
Pt states hasn't vomited since zofran, denies nausea unless she moves or rolls over in bed. Spouse at bedside.

## 2017-03-13 NOTE — PLAN OF CARE
Problem: Goal Outcome Summary  Goal: Goal Outcome Summary  Outcome: Improving  Pt reports that nausea has improved this morning, no emesis since arrival to floor.  5mg IV compazine was given this morning to keep nausea controlled and also toradol for complaints of generalized body aches from vomiting.

## 2017-03-14 ENCOUNTER — TELEPHONE (OUTPATIENT)
Dept: FAMILY MEDICINE | Facility: CLINIC | Age: 42
End: 2017-03-14

## 2017-03-14 NOTE — TELEPHONE ENCOUNTER
"Hospital/TCU/ED for chronic condition Discharge Protocol    \"Hi, my name is Elizabeth Joshi, a registered nurse, and I am calling from [facility].  I am calling to follow up and see how things are going for you after your recent emergency visit/hospital/TCU stay.\"    Tell me how you are doing now that you are home?\" States feeling less nauseous, eating soups. Continues with headache, body aches. Not sure what is best to take for headaches/ body aches. Drinking plenty of fluids. No fevers.       Discharge Instructions    \"Let's review your discharge instructions.  What is/are the follow-up recommendations?  Pt. Response: F/U 1 wk with PCP, need F/U CBC  \"Has an appointment with your primary care provider been scheduled?\"   No (schedule appointment) Will check work schedule and call back to make appt.    \"When you see the provider, I would recommend that you bring your medications with you.\"    Medications    \"Tell me what changed about your medicines when you discharged?\"    Changes to chronic meds?    0-1    \"What questions do you have about your medications?\"    None     New diagnoses of heart failure, COPD, diabetes, or MI?    No              Medication reconciliation completed? No  Was MTM referral placed (*Make sure to put transitions as reason for referral)?   No    Call Summary    \"What questions or concerns do you have about your recent visit and your follow-up care?\"     Trial OTC ibuprofen for headache, body aches.Take with food. Continue fluids, rest.. Advice given: See above    \"If you have questions or things don't continue to improve, we encourage you contact us through the main clinic number (give number).  Even if the clinic is not open, triage nurses are available 24/7 to help you.     We would like you to know that our clinic has extended hours (provide information).  We also have urgent care (provide details on closest location and hours/contact info)\"      \"Thank you for your time and take " "care!\"           "

## 2017-03-21 ENCOUNTER — OFFICE VISIT (OUTPATIENT)
Dept: FAMILY MEDICINE | Facility: CLINIC | Age: 42
End: 2017-03-21
Payer: COMMERCIAL

## 2017-03-21 VITALS
BODY MASS INDEX: 44.41 KG/M2 | RESPIRATION RATE: 18 BRPM | HEIGHT: 68 IN | SYSTOLIC BLOOD PRESSURE: 118 MMHG | HEART RATE: 68 BPM | TEMPERATURE: 98.4 F | DIASTOLIC BLOOD PRESSURE: 72 MMHG | WEIGHT: 293 LBS

## 2017-03-21 DIAGNOSIS — A08.4 VIRAL GASTROENTERITIS: Primary | ICD-10-CM

## 2017-03-21 DIAGNOSIS — Z09 FOLLOW-UP EXAMINATION: ICD-10-CM

## 2017-03-21 DIAGNOSIS — K80.20 CALCULUS OF GALLBLADDER WITHOUT CHOLECYSTITIS WITHOUT OBSTRUCTION: ICD-10-CM

## 2017-03-21 LAB
ANION GAP SERPL CALCULATED.3IONS-SCNC: 7 MMOL/L (ref 3–14)
BASOPHILS # BLD AUTO: 0 10E9/L (ref 0–0.2)
BASOPHILS NFR BLD AUTO: 0.2 %
BUN SERPL-MCNC: 11 MG/DL (ref 7–30)
CALCIUM SERPL-MCNC: 8.8 MG/DL (ref 8.5–10.1)
CHLORIDE SERPL-SCNC: 102 MMOL/L (ref 94–109)
CO2 SERPL-SCNC: 27 MMOL/L (ref 20–32)
CREAT SERPL-MCNC: 0.69 MG/DL (ref 0.52–1.04)
DIFFERENTIAL METHOD BLD: ABNORMAL
EOSINOPHIL # BLD AUTO: 0.4 10E9/L (ref 0–0.7)
EOSINOPHIL NFR BLD AUTO: 3.1 %
ERYTHROCYTE [DISTWIDTH] IN BLOOD BY AUTOMATED COUNT: 12.9 % (ref 10–15)
GFR SERPL CREATININE-BSD FRML MDRD: NORMAL ML/MIN/1.7M2
GLUCOSE SERPL-MCNC: 97 MG/DL (ref 70–99)
HCT VFR BLD AUTO: 41.1 % (ref 35–47)
HGB BLD-MCNC: 13.6 G/DL (ref 11.7–15.7)
LYMPHOCYTES # BLD AUTO: 2.3 10E9/L (ref 0.8–5.3)
LYMPHOCYTES NFR BLD AUTO: 19.9 %
MCH RBC QN AUTO: 28.5 PG (ref 26.5–33)
MCHC RBC AUTO-ENTMCNC: 33.1 G/DL (ref 31.5–36.5)
MCV RBC AUTO: 86 FL (ref 78–100)
MONOCYTES # BLD AUTO: 0.7 10E9/L (ref 0–1.3)
MONOCYTES NFR BLD AUTO: 5.8 %
NEUTROPHILS # BLD AUTO: 8 10E9/L (ref 1.6–8.3)
NEUTROPHILS NFR BLD AUTO: 71 %
PLATELET # BLD AUTO: 271 10E9/L (ref 150–450)
POTASSIUM SERPL-SCNC: 4 MMOL/L (ref 3.4–5.3)
RBC # BLD AUTO: 4.77 10E12/L (ref 3.8–5.2)
SODIUM SERPL-SCNC: 136 MMOL/L (ref 133–144)
WBC # BLD AUTO: 11.3 10E9/L (ref 4–11)

## 2017-03-21 PROCEDURE — 85025 COMPLETE CBC W/AUTO DIFF WBC: CPT | Performed by: FAMILY MEDICINE

## 2017-03-21 PROCEDURE — 80048 BASIC METABOLIC PNL TOTAL CA: CPT | Performed by: FAMILY MEDICINE

## 2017-03-21 PROCEDURE — 99495 TRANSJ CARE MGMT MOD F2F 14D: CPT | Performed by: FAMILY MEDICINE

## 2017-03-21 PROCEDURE — 36415 COLL VENOUS BLD VENIPUNCTURE: CPT | Performed by: FAMILY MEDICINE

## 2017-03-21 NOTE — LETTER
[facility]  [address] [address]  Phone: [phone]  Fax: [phone]    March 23, 2017    Anne Cortez  [address] [address] [address]          Dear Ms. CerratoCortez,    The results of your recent lab tests were within normal limits. Enclosed is a copy of these results.  If you have any further questions or problems, please contact our office.      Sincerely,      Yrn Lopez MD/ isidoro

## 2017-03-21 NOTE — MR AVS SNAPSHOT
After Visit Summary   3/21/2017    Anne Cortez    MRN: [id number]           Patient Information     Date Of Birth          1975        Visit Information        Provider Department      3/21/2017 4:20 PM Yrn Lopez MD [facility]        Today's Diagnoses     Viral gastroenteritis    -  1    Follow-up examination        Calculus of gallbladder without cholecystitis without obstruction          Care Instructions      What are Gallstones?  The gallbladder stores bile, a fluid made by the liver. Bile helps digest fats in the foods you eat. Gallstones form when certain substances in the bile crystallize and become solid. In some cases, the stones don t cause any symptoms. In others, they irritate the walls of the gallbladder. More serious problems can occur if stones move into nearby ducts--such as the common bile duct--and cause blockages. This can block the flow of bile and can lead to pain, nausea, and infection.    Common symptoms  Gallbladder problems can cause painful attacks, often after a meal. Some people have only one attack. Others have many. Common symptoms include:    Severe, steady pain or aching in the upper right abdomen, back, or right shoulder blade, lasting from 30 minutes to several hours    A dull ache beneath the ribs or breastbone    Nausea, upset stomach, or vomiting    Jaundice (a buildup of bile chemicals in the blood), which causes yellowing of the skin and eyes, dark urine, and itching. Call your doctor immediately if this system develops.  Treating gallstones  If your stones are not causing symptoms, you may choose to delay treatment. But if you ve had one or more painful attacks, your doctor will likely recommend removing your gallbladder. This prevents more stones from forming and causing attacks. It also helps prevent complications, such as stones passing into the ducts and causing infection or pancreatitis. After the gallbladder is removed,  your liver will still make bile to aid digestion.     If you re pregnant  Hormone changes during pregnancy can make bile more likely to form stones. If your gallbladder needs to be removed, your doctor will talk with you about the timing for surgery. In some cases, it can be delayed until after childbirth. In others, you may have surgery during pregnancy. This helps protect you and your baby s health.        4598-0636 [employer]. [address] [place] [address], [place] [address]. All rights reserved. This information is not intended as a substitute for professional medical care. Always follow your healthcare professional's instructions.        Viral Gastroenteritis (Adult)    Gastroenteritis is commonly called the stomach flu. It is most often caused by a virus that affects the stomach and intestinal tract and usually lasts from 2 to 7 days. Common viruses causing gastroenteritis include norovirus, rotavirus, and hepatitis A. Non-viral causes of gastroenteritis include bacteria, parasites, and toxins.  The danger from repeated vomiting or diarrhea is dehydration. This is the loss of too much fluid from the body. When this occurs, body fluids must be replaced. Antibiotics do not help with this illness because it is usually viral.Simple home treatment will be helpful.  Symptoms of viral gastroenteritis may include:    Watery, loose stools    Stomach pain or abdominal cramps    Fever and chills    Nausea and vomiting    Loss of bowel control    Headache  Home care  Gastroenteritis is transmitted by contact with the stool or vomit of an infected person. This can occur from person to person or from contact with a contaminated surface.  Follow these guidelines when caring for yourself at home:    If symptoms are severe, rest at home for the next 24 hours or until you are feeling better.    Wash your hands with soap and water or use alcohol-based  to prevent the spread of infection. Wash your hands after  touching anyone who is sick.    Wash your hands or use alcohol-based  after using the toilet and before meals. Clean the toilet after each use.  Remember these tips when preparing food:    People with diarrhea should not prepare or serve food to others. When preparing foods, wash your hands before and after.    Wash your hands after using cutting boards, countertops, knives, or utensils that have been in contact with raw food.    Keep uncooked meats away from cooked and ready-to-eat foods.  Medicine  You may use acetaminophen or NSAID medicines like ibuprofen or naproxen to control fever unless another medicine was given. If you have chronic liver or kidney disease, talk with your healthcare provider before using these medicines. Also talk with your provider if you've had a stomach ulcer or gastrointestinal bleeding. Don't give aspirin to anyone under 18 years of age who is ill with a fever. It may cause severe liver damage. Don't use NSAIDS is you are already taking one for another condition (like arthritis) or are on aspirin (such as for heart disease or after a stroke).  If medicine for vomiting or diarrhea are prescribed, take these only as directed. Do not take over-the-counter medicines for vomiting or diarrhea unless instructed by your healthcare provider.  Diet  Follow these guidelines for food:    Water and liquids are important so you don't get dehydrated. Drink a small amount at a time or suck on ice chips if you are vomiting.    If you eat, avoid fatty, greasy, spicy, or fried foods.    Don't eat dairy if you have diarrhea. This can make diarrhea worse.    Avoid tobacco, alcohol, and caffeine which may worsen symptoms.  During the first 24 hours (the first full day), follow the diet below:    Beverages. Sports drinks, soft drinks without caffeine, ginger ale, mineral water (plain or flavored), decaffeinated tea and coffee. If you are very dehydrated, sports drinks aren't a good choice. They have  too much sugar and not enough electrolytes. In this case, commercially available products called oral rehydration solutions, are best.    Soups. Eat clear broth, consommé, and bouillon.    Desserts. Eat gelatin, popsicles, and fruit juice bars.  During the next 24 hours (the second day), you may add the following to the above:    Hot cereal, plain toast, bread, rolls, and crackers    Plain noodles, rice, mashed potatoes, chicken noodle or rice soup    Unsweetened canned fruit (avoid pineapple), bananas    Limit fat intake to less than 15 grams per day. Do this by avoiding margarine, butter, oils, mayonnaise, sauces, gravies, fried foods, peanut butter, meat, poultry, and fish.    Limit fiber and avoid raw or cooked vegetables, fresh fruits (except bananas), and bran cereals.    Limit caffeine and chocolate. Don't use spices or seasonings other than salt.    Limit dairy products.    Avoid alcohol.  During the next 24 hours:    Gradually resume a normal diet as you feel better and your symptoms improve.    If at any time it starts getting worse again, go back to clear liquids until you feel better.  Follow-up care  Follow up with your healthcare provider, or as advised. Call your provider if you don't get better within 24 hours or if diarrhea lasts more than a week. Also follow up if you are unable to keep down liquids and get dehydrated. If a stool (diarrhea) sample was taken, call as directed for the results.  Call 911  Call 911 if any of these occur:    Trouble breathing    Chest pain    Confused    Severe drowsiness or trouble awakening    Fainting or loss of consciousness    Rapid heart rate    Seizure    Stiff neck  When to seek medical advice  Call your healthcare provider right away if any of these occur:    Abdominal pain that gets worse    Continued vomiting (unable to keep liquids down)    Frequent diarrhea (more than 5 times a day)    Blood in vomit or stool (black or red color)    Dark urine, reduced  urine output, or extreme thirst    Weakness or dizziness    Drowsiness    Fever of 100.4 F (38 C) oral or higher that does not get better with fever medicine    New rash    2648-0543 [employer]. [address] [place] [address], [place] [address]. All rights reserved. This information is not intended as a substitute for professional medical care. Always follow your healthcare professional's instructions.              Follow-ups after your visit        Additional Services     GENERAL SURG ADULT REFERRAL       Your provider has referred you to: [facility]: [facility] [phone]   [email or web address]    Please be aware that coverage of these services is subject to the terms and limitations of your health insurance plan.  Call member services at your health plan with any benefit or coverage questions.      Please bring the following with you to your appointment:    (1) Any X-Rays, CTs or MRIs which have been performed.  Contact the facility where they were done to arrange for  prior to your scheduled appointment.   (2) List of current medications   (3) This referral request   (4) Any documents/labs given to you for this referral                  Who to contact     If you have questions or need follow up information about today's clinic visit or your schedule please contact [facility] directly at [phone].  Normal or non-critical lab and imaging results will be communicated to you by MyChart, letter or phone within 4 business days after the clinic has received the results. If you do not hear from us within 7 days, please contact the clinic through MyChart or phone. If you have a critical or abnormal lab result, we will notify you by phone as soon as possible.  Submit refill requests through [employer] or call your pharmacy and they will forward the refill request to us. Please allow 3 business days for your refill to be completed.           "Additional Information About Your Visit        MyChart Information     "[employer] gives you secure access to your electronic health record. If you see a primary care provider, you can also send messages to your care team and make appointments. If you have questions, please call your primary care clinic.  If you do not have a primary care provider, please call [phone] and they will assist you.        Care EveryWhere ID     This is your Care EveryWhere ID. This could be used by other organizations to access your [facility] medical records  [id number]        Your Vitals Were     Pulse Temperature Respirations Height Breastfeeding? BMI (Body Mass Index)    68 98.4  F (36.9  C) (Tympanic) 18 5' 8\" (1.727 m) No 51.24 kg/m2       Blood Pressure from Last 3 Encounters:   03/21/17 118/72   03/13/17 124/71   12/16/16 122/88    Weight from Last 3 Encounters:   03/21/17 (!) 337 lb (152.9 kg)   03/13/17 (!) 345 lb 0.3 oz (156.5 kg)   12/16/16 (!) 340 lb (154.2 kg)              We Performed the Following     Basic metabolic panel  (Ca, Cl, CO2, Creat, Gluc, K, Na, BUN)     CBC with platelets and differential     GENERAL SURG ADULT REFERRAL        Primary Care Provider Office Phone # Fax #    Yrn Ur MD Jessica [phone]       [facility] [address] [facility] [address]        Thank you!     Thank you for choosing [facility]  for your care. Our goal is always to provide you with excellent care. Hearing back from our patients is one way we can continue to improve our services. Please take a few minutes to complete the written survey that you may receive in the mail after your visit with us. Thank you!             Your Updated Medication List - Protect others around you: Learn how to safely use, store and throw away your medicines at www.disposemymeds.org.          This list is accurate as of: 3/21/17  4:45 PM.  Always use your most recent med list.                   Brand " Name Dispense Instructions for use    fluticasone 50 MCG/ACT spray    FLONASE    1 Package    Spray 1-2 sprays into both nostrils daily as needed for rhinitis       levonorgestrel 20 MCG/24HR IUD    MIRENA     1 each by Intrauterine route once       VITAMIN D PO      Take 4,000 Units by mouth daily

## 2017-03-21 NOTE — PATIENT INSTRUCTIONS
What are Gallstones?  The gallbladder stores bile, a fluid made by the liver. Bile helps digest fats in the foods you eat. Gallstones form when certain substances in the bile crystallize and become solid. In some cases, the stones don t cause any symptoms. In others, they irritate the walls of the gallbladder. More serious problems can occur if stones move into nearby ducts such as the common bile duct and cause blockages. This can block the flow of bile and can lead to pain, nausea, and infection.    Common symptoms  Gallbladder problems can cause painful attacks, often after a meal. Some people have only one attack. Others have many. Common symptoms include:    Severe, steady pain or aching in the upper right abdomen, back, or right shoulder blade, lasting from 30 minutes to several hours    A dull ache beneath the ribs or breastbone    Nausea, upset stomach, or vomiting    Jaundice (a buildup of bile chemicals in the blood), which causes yellowing of the skin and eyes, dark urine, and itching. Call your doctor immediately if this system develops.  Treating gallstones  If your stones are not causing symptoms, you may choose to delay treatment. But if you ve had one or more painful attacks, your doctor will likely recommend removing your gallbladder. This prevents more stones from forming and causing attacks. It also helps prevent complications, such as stones passing into the ducts and causing infection or pancreatitis. After the gallbladder is removed, your liver will still make bile to aid digestion.     If you re pregnant  Hormone changes during pregnancy can make bile more likely to form stones. If your gallbladder needs to be removed, your doctor will talk with you about the timing for surgery. In some cases, it can be delayed until after childbirth. In others, you may have surgery during pregnancy. This helps protect you and your baby s health.        9223-3965 [employer]. [address] [place] [address]  Road, [place] [address]. All rights reserved. This information is not intended as a substitute for professional medical care. Always follow your healthcare professional's instructions.        Viral Gastroenteritis (Adult)    Gastroenteritis is commonly called the stomach flu. It is most often caused by a virus that affects the stomach and intestinal tract and usually lasts from 2 to 7 days. Common viruses causing gastroenteritis include norovirus, rotavirus, and hepatitis A. Non-viral causes of gastroenteritis include bacteria, parasites, and toxins.  The danger from repeated vomiting or diarrhea is dehydration. This is the loss of too much fluid from the body. When this occurs, body fluids must be replaced. Antibiotics do not help with this illness because it is usually viral.Simple home treatment will be helpful.  Symptoms of viral gastroenteritis may include:    Watery, loose stools    Stomach pain or abdominal cramps    Fever and chills    Nausea and vomiting    Loss of bowel control    Headache  Home care  Gastroenteritis is transmitted by contact with the stool or vomit of an infected person. This can occur from person to person or from contact with a contaminated surface.  Follow these guidelines when caring for yourself at home:    If symptoms are severe, rest at home for the next 24 hours or until you are feeling better.    Wash your hands with soap and water or use alcohol-based  to prevent the spread of infection. Wash your hands after touching anyone who is sick.    Wash your hands or use alcohol-based  after using the toilet and before meals. Clean the toilet after each use.  Remember these tips when preparing food:    People with diarrhea should not prepare or serve food to others. When preparing foods, wash your hands before and after.    Wash your hands after using cutting boards, countertops, knives, or utensils that have been in contact with raw food.    Keep uncooked meats away  from cooked and ready-to-eat foods.  Medicine  You may use acetaminophen or NSAID medicines like ibuprofen or naproxen to control fever unless another medicine was given. If you have chronic liver or kidney disease, talk with your healthcare provider before using these medicines. Also talk with your provider if you've had a stomach ulcer or gastrointestinal bleeding. Don't give aspirin to anyone under 18 years of age who is ill with a fever. It may cause severe liver damage. Don't use NSAIDS is you are already taking one for another condition (like arthritis) or are on aspirin (such as for heart disease or after a stroke).  If medicine for vomiting or diarrhea are prescribed, take these only as directed. Do not take over-the-counter medicines for vomiting or diarrhea unless instructed by your healthcare provider.  Diet  Follow these guidelines for food:    Water and liquids are important so you don't get dehydrated. Drink a small amount at a time or suck on ice chips if you are vomiting.    If you eat, avoid fatty, greasy, spicy, or fried foods.    Don't eat dairy if you have diarrhea. This can make diarrhea worse.    Avoid tobacco, alcohol, and caffeine which may worsen symptoms.  During the first 24 hours (the first full day), follow the diet below:    Beverages. Sports drinks, soft drinks without caffeine, ginger ale, mineral water (plain or flavored), decaffeinated tea and coffee. If you are very dehydrated, sports drinks aren't a good choice. They have too much sugar and not enough electrolytes. In this case, commercially available products called oral rehydration solutions, are best.    Soups. Eat clear broth, consommé, and bouillon.    Desserts. Eat gelatin, popsicles, and fruit juice bars.  During the next 24 hours (the second day), you may add the following to the above:    Hot cereal, plain toast, bread, rolls, and crackers    Plain noodles, rice, mashed potatoes, chicken noodle or rice soup    Unsweetened  canned fruit (avoid pineapple), bananas    Limit fat intake to less than 15 grams per day. Do this by avoiding margarine, butter, oils, mayonnaise, sauces, gravies, fried foods, peanut butter, meat, poultry, and fish.    Limit fiber and avoid raw or cooked vegetables, fresh fruits (except bananas), and bran cereals.    Limit caffeine and chocolate. Don't use spices or seasonings other than salt.    Limit dairy products.    Avoid alcohol.  During the next 24 hours:    Gradually resume a normal diet as you feel better and your symptoms improve.    If at any time it starts getting worse again, go back to clear liquids until you feel better.  Follow-up care  Follow up with your healthcare provider, or as advised. Call your provider if you don't get better within 24 hours or if diarrhea lasts more than a week. Also follow up if you are unable to keep down liquids and get dehydrated. If a stool (diarrhea) sample was taken, call as directed for the results.  Call 911  Call 911 if any of these occur:    Trouble breathing    Chest pain    Confused    Severe drowsiness or trouble awakening    Fainting or loss of consciousness    Rapid heart rate    Seizure    Stiff neck  When to seek medical advice  Call your healthcare provider right away if any of these occur:    Abdominal pain that gets worse    Continued vomiting (unable to keep liquids down)    Frequent diarrhea (more than 5 times a day)    Blood in vomit or stool (black or red color)    Dark urine, reduced urine output, or extreme thirst    Weakness or dizziness    Drowsiness    Fever of 100.4 F (38 C) oral or higher that does not get better with fever medicine    New rash    7737-1770 [employer]. [address] [place] [address], [place] [address]. All rights reserved. This information is not intended as a substitute for professional medical care. Always follow your healthcare professional's instructions.
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
## 2017-08-09 ENCOUNTER — OFFICE VISIT (OUTPATIENT)
Dept: OBGYN | Facility: CLINIC | Age: 42
End: 2017-08-09
Payer: COMMERCIAL

## 2017-08-09 ENCOUNTER — HOSPITAL ENCOUNTER (OUTPATIENT)
Dept: MAMMOGRAPHY | Facility: CLINIC | Age: 42
Discharge: HOME | End: 2017-08-09
Attending: OBSTETRICS & GYNECOLOGY | Admitting: OBSTETRICS & GYNECOLOGY
Payer: COMMERCIAL

## 2017-08-09 VITALS
BODY MASS INDEX: 44.41 KG/M2 | HEART RATE: 73 BPM | SYSTOLIC BLOOD PRESSURE: 115 MMHG | WEIGHT: 293 LBS | DIASTOLIC BLOOD PRESSURE: 68 MMHG | HEIGHT: 68 IN

## 2017-08-09 DIAGNOSIS — Z12.31 VISIT FOR SCREENING MAMMOGRAM: ICD-10-CM

## 2017-08-09 DIAGNOSIS — Z01.419 ENCOUNTER FOR GYNECOLOGICAL EXAMINATION WITHOUT ABNORMAL FINDING: Primary | ICD-10-CM

## 2017-08-09 DIAGNOSIS — Z01.419 ENCOUNTER FOR GYNECOLOGICAL EXAMINATION WITHOUT ABNORMAL FINDING: ICD-10-CM

## 2017-08-09 PROCEDURE — 77063 BREAST TOMOSYNTHESIS BI: CPT

## 2017-08-09 PROCEDURE — 87624 HPV HI-RISK TYP POOLED RSLT: CPT | Performed by: OBSTETRICS & GYNECOLOGY

## 2017-08-09 PROCEDURE — 88175 CYTOPATH C/V AUTO FLUID REDO: CPT | Performed by: OBSTETRICS & GYNECOLOGY

## 2017-08-09 PROCEDURE — 99396 PREV VISIT EST AGE 40-64: CPT | Performed by: OBSTETRICS & GYNECOLOGY

## 2017-08-09 PROCEDURE — 88141 CYTOPATH C/V INTERPRET: CPT | Performed by: OBSTETRICS & GYNECOLOGY

## 2017-08-09 ASSESSMENT — PATIENT HEALTH QUESTIONNAIRE - PHQ9: SUM OF ALL RESPONSES TO PHQ QUESTIONS 1-9: 3

## 2017-08-09 NOTE — NURSING NOTE
"Initial /68 (BP Location: Right arm, Patient Position: Chair, Cuff Size: Adult Large)  Pulse 73  Ht 5' 8\" (1.727 m)  Wt (!) 334 lb (151.5 kg)  Breastfeeding? No  BMI 50.78 kg/m2 Estimated body mass index is 50.78 kg/(m^2) as calculated from the following:    Height as of this encounter: 5' 8\" (1.727 m).    Weight as of this encounter: 334 lb (151.5 kg). .    Leslie Browne    "

## 2017-08-09 NOTE — MR AVS SNAPSHOT
After Visit Summary   8/9/2017    Anne Cortez    MRN: [id number]           Patient Information     Date Of Birth          1975        Visit Information        Provider Department      8/9/2017 9:00 Gina Grande MD [facility]        Today's Diagnoses     Encounter for gynecological examination without abnormal finding    -  1      Care Instructions      Preventive Health Recommendations  Female Ages 40 to 49    Yearly exam:     See your health care provider every year in order to  1. Review health changes.   2. Discuss preventive care.    3. Review your medicines if your doctor prescribed any.      Get a Pap test every three years (unless you have an abnormal result and your provider advises testing more often).      If you get Pap tests with HPV test, you only need to test every 5 years, unless you have an abnormal result. You do not need a Pap test if your uterus was removed (hysterectomy) and you have not had cancer.      You should be tested each year for STDs (sexually transmitted diseases), if you're at risk.       Ask your doctor if you should have a mammogram.      Have a colonoscopy (test for colon cancer) if someone in your family has had colon cancer or polyps before age 50.       Have a cholesterol test every 5 years.       Have a diabetes test (fasting glucose) after age 45. If you are at risk for diabetes, you should have this test every 3 years.    Shots: Get a flu shot each year. Get a tetanus shot every 10 years.     Nutrition:     Eat at least 5 servings of fruits and vegetables each day.    Eat whole-grain bread, whole-wheat pasta and brown rice instead of white grains and rice.    Talk to your provider about Calcium and Vitamin D.     Lifestyle    Exercise at least 150 minutes a week (an average of 30 minutes a day, 5 days a week). This will help you control your weight and prevent disease.    Limit alcohol to one drink per day.    No  "smoking.     Wear sunscreen to prevent skin cancer.    See your dentist every six months for an exam and cleaning.          Follow-ups after your visit        Future tests that were ordered for you today     Open Future Orders        Priority Expected Expires Ordered    *MA Screening Digital Bilateral Routine  8/9/2018 8/9/2017            Who to contact     If you have questions or need follow up information about today's clinic visit or your schedule please contact [facility] directly at [phone].  Normal or non-critical lab and imaging results will be communicated to you by [employer], letter or phone within 4 business days after the clinic has received the results. If you do not hear from us within 7 days, please contact the clinic through [employer] or phone. If you have a critical or abnormal lab result, we will notify you by phone as soon as possible.  Submit refill requests through [employer] or call your pharmacy and they will forward the refill request to us. Please allow 3 business days for your refill to be completed.          Additional Information About Your Visit        [employer] Information     [employer] gives you secure access to your electronic health record. If you see a primary care provider, you can also send messages to your care team and make appointments. If you have questions, please call your primary care clinic.  If you do not have a primary care provider, please call [phone] and they will assist you.        Care EveryWhere ID     This is your Care EveryWhere ID. This could be used by other organizations to access your [facility] medical records  [id number]        Your Vitals Were     Pulse Height Breastfeeding? BMI (Body Mass Index)          73 5' 8\" (1.727 m) No 50.78 kg/m2         Blood Pressure from Last 3 Encounters:   08/09/17 115/68   03/21/17 118/72   03/13/17 124/71    Weight from Last 3 Encounters:   08/09/17 (!) 334 lb (151.5 kg)   03/21/17 (!) 337 lb (152.9 kg) "   03/13/17 (!) 345 lb 0.3 oz (156.5 kg)              We Performed the Following     Pap imaged thin layer screen with HPV - recommended age 30 - 65 years (select HPV order below)        Primary Care Provider Office Phone # Fax #    Yrn Vines MD Jessica [phone]       [address] [facility] [address]        Equal Access to Services     [facility] : Hadii aad ku hadasho Soomaali, waaxda luqadaha, qaybta kaalmada adeegyada, waxay idiin hayaan adeeg kharash la'aan . So [facility] [phone].    ATENCIÓN: Si habla español, tiene a ceja disposición servicios gratuitos de asistencia lingüística. Llame al [phone].    We comply with applicable federal civil rights laws and [place] laws. We do not discriminate on the basis of race, color, national origin, age, disability sex, sexual orientation or gender identity.            Thank you!     Thank you for choosing [facility]  for your care. Our goal is always to provide you with excellent care. Hearing back from our patients is one way we can continue to improve our services. Please take a few minutes to complete the written survey that you may receive in the mail after your visit with us. Thank you!             Your Updated Medication List - Protect others around you: Learn how to safely use, store and throw away your medicines at www.disposemymeds.org.          This list is accurate as of: 8/9/17  9:29 AM.  Always use your most recent med list.                   Brand Name Dispense Instructions for use Diagnosis    fluticasone 50 MCG/ACT spray    FLONASE    1 Package    Spray 1-2 sprays into both nostrils daily as needed for rhinitis    Chronic nasal congestion       levonorgestrel 20 MCG/24HR IUD    MIRENA     1 each by Intrauterine route once        VITAMIN D PO      Take 4,000 Units by mouth daily
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
## 2017-08-15 LAB
COPATH REPORT: ABNORMAL
PAP: ABNORMAL

## 2017-08-16 LAB
FINAL DIAGNOSIS: ABNORMAL
HPV HR 12 DNA CVX QL NAA+PROBE: NEGATIVE
HPV16 DNA SPEC QL NAA+PROBE: NEGATIVE
HPV18 DNA SPEC QL NAA+PROBE: POSITIVE
SPECIMEN DESCRIPTION: ABNORMAL

## 2017-09-05 ENCOUNTER — OFFICE VISIT (OUTPATIENT)
Dept: OBGYN | Facility: CLINIC | Age: 42
End: 2017-09-05
Payer: COMMERCIAL

## 2017-09-05 VITALS
BODY MASS INDEX: 51.54 KG/M2 | WEIGHT: 293 LBS | HEART RATE: 95 BPM | SYSTOLIC BLOOD PRESSURE: 138 MMHG | DIASTOLIC BLOOD PRESSURE: 88 MMHG

## 2017-09-05 DIAGNOSIS — D06.9 SEVERE DYSPLASIA OF CERVIX (CIN III): Primary | ICD-10-CM

## 2017-09-05 PROCEDURE — 88305 TISSUE EXAM BY PATHOLOGIST: CPT | Performed by: OBSTETRICS & GYNECOLOGY

## 2017-09-05 PROCEDURE — 57454 BX/CURETT OF CERVIX W/SCOPE: CPT | Performed by: OBSTETRICS & GYNECOLOGY

## 2017-09-05 NOTE — MR AVS SNAPSHOT
After Visit Summary   9/5/2017    Anne Cortez    MRN: [id number]           Patient Information     Date Of Birth          1975        Visit Information        Provider Department      9/5/2017 2:45 PM Gina Hooks MD; [facility]        Today's Diagnoses     Severe dysplasia of cervix (NELSON III)    -  1       Follow-ups after your visit        Who to contact     If you have questions or need follow up information about today's clinic visit or your schedule please contact [facility] directly at [phone].  Normal or non-critical lab and imaging results will be communicated to you by MyChart, letter or phone within 4 business days after the clinic has received the results. If you do not hear from us within 7 days, please contact the clinic through [employer] or phone. If you have a critical or abnormal lab result, we will notify you by phone as soon as possible.  Submit refill requests through [employer] or call your pharmacy and they will forward the refill request to us. Please allow 3 business days for your refill to be completed.          Additional Information About Your Visit        MyChart Information     [employer] gives you secure access to your electronic health record. If you see a primary care provider, you can also send messages to your care team and make appointments. If you have questions, please call your primary care clinic.  If you do not have a primary care provider, please call [phone] and they will assist you.        Care EveryWhere ID     This is your Care EveryWhere ID. This could be used by other organizations to access your [facility] medical records  [id number]        Your Vitals Were     Pulse Breastfeeding? BMI (Body Mass Index)             95 No 51.54 kg/m2          Blood Pressure from Last 3 Encounters:   09/05/17 138/88   08/09/17 115/68   03/21/17 118/72    Weight from Last 3 Encounters:   09/05/17 (!) 339  lb (153.8 kg)   08/09/17 (!) 334 lb (151.5 kg)   03/21/17 (!) 337 lb (152.9 kg)              We Performed the Following     COLP CERVIX/UPPER VAGINA W BX CERVIX/ENDOCERV CURETT     Surgical pathology exam        Primary Care Provider Office Phone # Fax #    Yrn Vines MD Jessica [phone]       [address] [facility] [address]        Equal Access to Services     BROOKS ROBINS : Hadii aad ku hadasho Soomaali, waaxda luqadaha, qaybta kaalmada adeegyada, waxay idiin hayaan adeeg kharatimur la'edin . So [facility] [phone].    ATENCIÓN: Si david arguelles, tiene a ceja disposición servicios gratuitos de asistencia lingüística. [facility] [phone].    We comply with applicable federal civil rights laws and [place] laws. We do not discriminate on the basis of race, color, national origin, age, disability sex, sexual orientation or gender identity.            Thank you!     Thank you for choosing [facility]  for your care. Our goal is always to provide you with excellent care. Hearing back from our patients is one way we can continue to improve our services. Please take a few minutes to complete the written survey that you may receive in the mail after your visit with us. Thank you!             Your Updated Medication List - Protect others around you: Learn how to safely use, store and throw away your medicines at www.disposemymeds.org.          This list is accurate as of: 9/5/17  3:21 PM.  Always use your most recent med list.                   Brand Name Dispense Instructions for use Diagnosis    fluticasone 50 MCG/ACT spray    FLONASE    1 Package    Spray 1-2 sprays into both nostrils daily as needed for rhinitis    Chronic nasal congestion       levonorgestrel 20 MCG/24HR IUD    MIRENA     1 each by Intrauterine route once        VITAMIN D PO      Take 4,000 Units by mouth daily

## 2017-09-05 NOTE — PROGRESS NOTES
Anne Cortez is a 42 year old female P2 ( x 2) who presents for repeat colposcopy.      Pap smear hx is as follows:   09:pap--ASCUS, + HPV 16. Recommend colposcopy.  3/2/09:Colpo--Negative. Repeat pap 1 year.  2/22/10:Pap--LSIL. Recommend colposcopy.  3/8/10:colpo--NELSON 3. Recommend LEEP  3/22/10:LEEP--NELSON 2. Repeat pap 6 months.  11/4/10:pap--NIL. Repeat pap 6 months. Reminder placed in Epic.  11:Pap--NIL. Repeat pap 1 year. Reminder placed in epic.  12:Pap--NIL. Pap in 1 year. Annual pap smears until .  13:Pap--NIL, -HPV. Continue yearly paps.  6/1/15: Pap - NIL, + HR HPV. Plan colp  6/11/15: Many Bx - LSIL, ECC - benign. Plan cotest in 1 year.   8/3/16: NIL Pap, + HR HPV 16,18 & other HR HPV. Plan colp  16: Many Bx & ECC - negative. Plan cotest in 1 year.   17 LSIL Pap, + HR HPV 18. Plan colp      No LMP recorded. Patient is not currently having periods (Reason: IUD).   UPT today is negative      Patient does not smoke    Type of contraception: Mirena IUD  Age at first sexual intercourse: 18  Number of sexual partners (lifetime): 15  Past GYN history:  Chlamydia, Gonorrhea and HPV  Prior cervical/vaginal disease: NELSON 3.  Prior cervical treatment: LEEP.      PROCEDURE:  Before the procedure, it was ensured that the patient was educated regarding the nature of her findings to date, the implications, and what was to be done. She has been made aware of the role of HPV, the natural history of infection, ways to minimize her future risk, the effect of HPV on the cervix, and treatment options available should they be indicated.  The details of the colposcopic procedure were reviewed.  All questions were answered before proceeding, and informed consent was therefore obtained.    Speculum placed in vagina and excellent visualization of cervix   acheived, cervix swabbed x 3 with acetic acid solution.    FINDINGS:  Cervix: acetowhitening noted 11:00; biopsy taken (1). IUD strings seen  at cervical os.   Please refer to images section for details.    Pap repeated?:  No  SCJ seen?:  yes    ECC done?:  Yes   Lugol's solution used?:  Yes   Satisfactory examination?:  yes      ASSESSMENT: NELSON 1.    PLAN: specimens labelled and sent to Pathology, will base further treatment on Pathology findings, post biopsy instructions given to patient and call to discuss Pathology results    Gina Hooks MD  [facility]

## 2017-09-05 NOTE — NURSING NOTE
"Initial /88 (BP Location: Left arm, Patient Position: Chair, Cuff Size: Adult Large)  Pulse 95  Wt (!) 339 lb (153.8 kg)  Breastfeeding? No  BMI 51.54 kg/m2 Estimated body mass index is 51.54 kg/(m^2) as calculated from the following:    Height as of 8/9/17: 5' 8\" (1.727 m).    Weight as of this encounter: 339 lb (153.8 kg). .    Leslie Browne    "

## 2017-09-07 LAB — COPATH REPORT: NORMAL

## 2017-09-07 NOTE — PROGRESS NOTES
Pt notified of below.  Pt reports understanding.  Pt does not have further questions or concerns.    Rosemarie Silva   [facility]  RN

## 2017-09-07 NOTE — PROGRESS NOTES
Inform patient that her colposcopy revealed low grade abnormality. Will repeat cotesting Pap smear in 1 year.     Gina Hooks MD  [facility]

## 2017-09-12 ENCOUNTER — OFFICE VISIT (OUTPATIENT)
Dept: FAMILY MEDICINE | Facility: CLINIC | Age: 42
End: 2017-09-12
Payer: COMMERCIAL

## 2017-09-12 VITALS
SYSTOLIC BLOOD PRESSURE: 110 MMHG | OXYGEN SATURATION: 95 % | WEIGHT: 293 LBS | HEIGHT: 68 IN | DIASTOLIC BLOOD PRESSURE: 80 MMHG | BODY MASS INDEX: 44.41 KG/M2 | HEART RATE: 95 BPM | RESPIRATION RATE: 18 BRPM | TEMPERATURE: 99.3 F

## 2017-09-12 DIAGNOSIS — R07.0 THROAT PAIN: Primary | ICD-10-CM

## 2017-09-12 DIAGNOSIS — J20.9 ACUTE BRONCHITIS WITH SYMPTOMS > 10 DAYS: ICD-10-CM

## 2017-09-12 LAB
DEPRECATED S PYO AG THROAT QL EIA: NORMAL
SPECIMEN SOURCE: NORMAL

## 2017-09-12 PROCEDURE — 99213 OFFICE O/P EST LOW 20 MIN: CPT | Performed by: FAMILY MEDICINE

## 2017-09-12 PROCEDURE — 87880 STREP A ASSAY W/OPTIC: CPT | Performed by: FAMILY MEDICINE

## 2017-09-12 PROCEDURE — 87081 CULTURE SCREEN ONLY: CPT | Performed by: FAMILY MEDICINE

## 2017-09-12 RX ORDER — CODEINE PHOSPHATE AND GUAIFENESIN 10; 100 MG/5ML; MG/5ML
1 SOLUTION ORAL EVERY 6 HOURS PRN
Qty: 180 ML | Refills: 0 | Status: SHIPPED | OUTPATIENT
Start: 2017-09-12 | End: 2018-04-27

## 2017-09-12 RX ORDER — AZITHROMYCIN 250 MG/1
TABLET, FILM COATED ORAL
Qty: 6 TABLET | Refills: 0 | Status: SHIPPED | OUTPATIENT
Start: 2017-09-12 | End: 2018-04-27

## 2017-09-12 RX ORDER — PREDNISONE 20 MG/1
40 TABLET ORAL DAILY
Qty: 10 TABLET | Refills: 0 | Status: SHIPPED | OUTPATIENT
Start: 2017-09-12 | End: 2017-09-17

## 2017-09-12 NOTE — MR AVS SNAPSHOT
After Visit Summary   9/12/2017    Anne Cortez    MRN: [id number]           Patient Information     Date Of Birth          1975        Visit Information        Provider Department      9/12/2017 10:40 AM Yrn Lopez MD [facility]        Today's Diagnoses     Throat pain    -  1    Acute bronchitis with symptoms > 10 days          Care Instructions      Acute Bronchitis  Your healthcare provider has told you that you have acute bronchitis. Bronchitis is infection or inflammation of the bronchial tubes (airways in the lungs). Normally, air moves easily in and out of the airways. Bronchitis narrows the airways, making it harder for air to flow in and out of the lungs. This causes symptoms such as shortness of breath, coughing up yellow or green mucus, and wheezing. Bronchitis can be acute or chronic. Acute means the condition comes on quickly and goes away in a short time, usually within 3 to 10 days. Chronic means a condition lasts a long time and often comes back.    What causes acute bronchitis?  Acute bronchitis almost always starts as a viral respiratory infection, such as a cold or the flu. Certain factors make it more likely for a cold or flu to turn into bronchitis. These include being very young, being elderly, having a heart or lung problem, or having a weak immune system. Cigarette smoking also makes bronchitis more likely.  When bronchitis develops, the airways become swollen. The airways may also become infected with bacteria. This is known as a secondary infection.  Diagnosing acute bronchitis  Your healthcare provider will examine you and ask about your symptoms and health history. You may also have a sputum culture to test the fluid in your lungs. Chest X-rays may be done to look for infection in the lungs.  Treating acute bronchitis  Bronchitis usually clears up as the cold or flu goes away. You can help feel better faster by doing the following:    Take  medicine as directed. You may be told to take ibuprofen or other over-the-counter medicines. These help relieve inflammation in your bronchial tubes. Your healthcare provider may prescribe an inhaler to help open up the bronchial tubes. Most of the time, acute bronchitis is caused by a viral infection. Antibiotics are usually not prescribed for viral infections.    Drink plenty of fluids, such as water, juice, or warm soup. Fluids loosen mucus so that you can cough it up. This helps you breathe more easily. Fluids also prevent dehydration.    Make sure you get plenty of rest.    Do not smoke. Do not allow anyone else to smoke in your home.  Recovery and follow-up  Follow up with your doctor as you are told. You will likely feel better in a week or two. But a dry cough can linger beyond that time. Let your doctor know if you still have symptoms (other than a dry cough) after 2 weeks, or if you re prone to getting bronchial infections. Take steps to protect yourself from future infections. These steps include stopping smoking and avoiding tobacco smoke, washing your hands often, and getting a yearly flu shot.  When to call your healthcare provider  Call the healthcare provider if you have any of the following:    Fever of 100.4 F (38.0 C) or higher, or as advised    Symptoms that get worse, or new symptoms    Trouble breathing    Symptoms that don t start to improve within a week, or within 3 days of taking antibiotics   Date Last Reviewed: 12/1/2016 2000-2017 [employer]. [address] [place] [address], [place] [address]. All rights reserved. This information is not intended as a substitute for professional medical care. Always follow your healthcare professional's instructions.        Patient Education    Azithromycin Ophthalmic drops, solution    Azithromycin Oral suspension    Azithromycin Oral suspension, extended release    Azithromycin Oral tablet    Azithromycin Solution for injection  Azithromycin  Oral tablet  What is this medicine?  AZITHROMYCIN (az ith jose MYE sin) is a macrolide antibiotic. It is used to treat or prevent certain kinds of bacterial infections. It will not work for colds, flu, or other viral infections.  This medicine may be used for other purposes; ask your health care provider or pharmacist if you have questions.  What should I tell my health care provider before I take this medicine?  They need to know if you have any of these conditions:    kidney disease    liver disease    irregular heartbeat or heart disease    an unusual or allergic reaction to azithromycin, erythromycin, other macrolide antibiotics, foods, dyes, or preservatives    pregnant or trying to get pregnant    breast-feeding  How should I use this medicine?  Take this medicine by mouth with a full glass of water. Follow the directions on the prescription label. The tablets can be taken with food or on an empty stomach. If the medicine upsets your stomach, take it with food. Take your medicine at regular intervals. Do not take your medicine more often than directed. Take all of your medicine as directed even if you think your are better. Do not skip doses or stop your medicine early.  Talk to your pediatrician regarding the use of this medicine in children. Special care may be needed.  Overdosage: If you think you have taken too much of this medicine contact a poison control center or emergency room at once.  NOTE: This medicine is only for you. Do not share this medicine with others.  What if I miss a dose?  If you miss a dose, take it as soon as you can. If it is almost time for your next dose, take only that dose. Do not take double or extra doses.  What may interact with this medicine?  Do not take this medicine with any of the following medications:    lincomycin  This medicine may also interact with the following medications:    amiodarone    antacids    birth control  pills    cyclosporine    digoxin    magnesium    nelfinavir    phenytoin    warfarin  This list may not describe all possible interactions. Give your health care provider a list of all the medicines, herbs, non-prescription drugs, or dietary supplements you use. Also tell them if you smoke, drink alcohol, or use illegal drugs. Some items may interact with your medicine.  What should I watch for while using this medicine?  Tell your doctor or health care professional if your symptoms do not improve.  Do not treat diarrhea with over the counter products. Contact your doctor if you have diarrhea that lasts more than 2 days or if it is severe and watery.  This medicine can make you more sensitive to the sun. Keep out of the sun. If you cannot avoid being in the sun, wear protective clothing and use sunscreen. Do not use sun lamps or tanning beds/booths.  What side effects may I notice from receiving this medicine?  Side effects that you should report to your doctor or health care professional as soon as possible:    allergic reactions like skin rash, itching or hives, swelling of the face, lips, or tongue    confusion, nightmares or hallucinations    dark urine    difficulty breathing    hearing loss    irregular heartbeat or chest pain    pain or difficulty passing urine    redness, blistering, peeling or loosening of the skin, including inside the mouth    white patches or sores in the mouth    yellowing of the eyes or skin  Side effects that usually do not require medical attention (report to your doctor or health care professional if they continue or are bothersome):    diarrhea    dizziness, drowsiness    headache    stomach upset or vomiting    tooth discoloration    vaginal irritation  This list may not describe all possible side effects. Call your doctor for medical advice about side effects. You may report side effects to FDA at 3-304-FDA-2904.  Where should I keep my medicine?  Keep out of the reach of  children.  Store at room temperature between 15 and 30 degrees C (59 and 86 degrees F). Throw away any unused medicine after the expiration date.  NOTE:This sheet is a summary. It may not cover all possible information. If you have questions about this medicine, talk to your doctor, pharmacist, or health care provider. Copyright  2016 [employer]        Patient Education    Prednisone Gastro-resistant tablet    Prednisone Oral solution    Prednisone Oral tablet  Prednisone Oral tablet  What is this medicine?  PREDNISONE (PRED ni sone) is a corticosteroid. It is commonly used to treat inflammation of the skin, joints, lungs, and other organs. Common conditions treated include asthma, allergies, and arthritis. It is also used for other conditions, such as blood disorders and diseases of the adrenal glands.  This medicine may be used for other purposes; ask your health care provider or pharmacist if you have questions.  What should I tell my health care provider before I take this medicine?  They need to know if you have any of these conditions:    Cushing's syndrome    diabetes    glaucoma    heart disease    high blood pressure    infection (especially a virus infection such as chickenpox, cold sores, or herpes)    kidney disease    liver disease    mental illness    myasthenia gravis    osteoporosis    seizures    stomach or intestine problems    thyroid disease    an unusual or allergic reaction to lactose, prednisone, other medicines, foods, dyes, or preservatives    pregnant or trying to get pregnant    breast-feeding  How should I use this medicine?  Take this medicine by mouth with a glass of water. Follow the directions on the prescription label. Take this medicine with food. If you are taking this medicine once a day, take it in the morning. Do not take more medicine than you are told to take. Do not suddenly stop taking your medicine because you may develop a severe reaction. Your doctor will tell you how  much medicine to take. If your doctor wants you to stop the medicine, the dose may be slowly lowered over time to avoid any side effects.  Talk to your pediatrician regarding the use of this medicine in children. Special care may be needed.  Overdosage: If you think you have taken too much of this medicine contact a poison control center or emergency room at once.  NOTE: This medicine is only for you. Do not share this medicine with others.  What if I miss a dose?  If you miss a dose, take it as soon as you can. If it is almost time for your next dose, talk to your doctor or health care professional. You may need to miss a dose or take an extra dose. Do not take double or extra doses without advice.  What may interact with this medicine?  Do not take this medicine with any of the following medications:    metyrapone    mifepristone  This medicine may also interact with the following medications:    aminoglutethimide    amphotericin B    aspirin and aspirin-like medicines    barbiturates    certain medicines for diabetes, like glipizide or glyburide    cholestyramine    cholinesterase inhibitors    cyclosporine    digoxin    diuretics    ephedrine    female hormones, like estrogens and birth control pills    isoniazid    ketoconazole    NSAIDS, medicines for pain and inflammation, like ibuprofen or naproxen    phenytoin    rifampin    toxoids    vaccines    warfarin  This list may not describe all possible interactions. Give your health care provider a list of all the medicines, herbs, non-prescription drugs, or dietary supplements you use. Also tell them if you smoke, drink alcohol, or use illegal drugs. Some items may interact with your medicine.  What should I watch for while using this medicine?  Visit your doctor or health care professional for regular checks on your progress. If you are taking this medicine over a prolonged period, carry an identification card with your name and address, the type and dose of  your medicine, and your doctor's name and address.  This medicine may increase your risk of getting an infection. Tell your doctor or health care professional if you are around anyone with measles or chickenpox, or if you develop sores or blisters that do not heal properly.  If you are going to have surgery, tell your doctor or health care professional that you have taken this medicine within the last twelve months.  Ask your doctor or health care professional about your diet. You may need to lower the amount of salt you eat.  This medicine may affect blood sugar levels. If you have diabetes, check with your doctor or health care professional before you change your diet or the dose of your diabetic medicine.  What side effects may I notice from receiving this medicine?  Side effects that you should report to your doctor or health care professional as soon as possible:    allergic reactions like skin rash, itching or hives, swelling of the face, lips, or tongue    changes in emotions or moods    changes in vision    depressed mood    eye pain    fever or chills, cough, sore throat, pain or difficulty passing urine    increased thirst    swelling of ankles, feet  Side effects that usually do not require medical attention (report to your doctor or health care professional if they continue or are bothersome):    confusion, excitement, restlessness    headache    nausea, vomiting    skin problems, acne, thin and shiny skin    trouble sleeping    weight gain  This list may not describe all possible side effects. Call your doctor for medical advice about side effects. You may report side effects to FDA at 1-102-FDA-1781.  Where should I keep my medicine?  Keep out of the reach of children.  Store at room temperature between 15 and 30 degrees C (59 and 86 degrees F). Protect from light. Keep container tightly closed. Throw away any unused medicine after the expiration date.  NOTE:This sheet is a summary. It may not cover  all possible information. If you have questions about this medicine, talk to your doctor, pharmacist, or health care provider. Copyright  2016 [employer]        Patient Education    Codeine Phosphate, Guaifenesin Oral solution    Codeine Phosphate, Guaifenesin Oral syrup    Codeine Phosphate, Guaifenesin Oral tablet  Codeine Phosphate, Guaifenesin Oral syrup  What is this medicine?  CODEINE; GUAIFENESIN (KOE katya; gwye FEN e sin) is a cough suppressant and expectorant. It helps to stop or reduce coughing due to the common cold or inhaled irritants. It will not treat an infection.  This medicine may be used for other purposes; ask your health care provider or pharmacist if you have questions.  What should I tell my health care provider before I take this medicine?  They need to know if you have any of these conditions:    Carlos's disease    convulsions    drug or alcohol abuse or addiction    enlarged prostate    fever    intestinal or stomach problems    kidney disease    liver disease    lung disease like asthma or emphysema    recent surgery or injury    thyroid disease    an allergic or unusual reaction to codeine, hydromorphone, hydrocodone, oxycodone, morphine, guaifenesin, other medicines, foods, dyes, or preservatives    pregnant or trying to get pregnant    breast-feeding  How should I use this medicine?  Take this medicine by mouth with a full glass of water. Follow the directions on the prescription label. Use a specially marked spoon or container to measure your medicine. Ask your pharmacist if you do not have one. Household spoons are not accurate. Take this medicine with food or milk if it upsets your stomach. Take your doses at regular times. Do not take more medicine than directed.  Talk to your pediatrician regarding the use of this medicine in children. Special care may be needed.  Overdosage: If you think you have taken too much of this medicine contact a poison control center or emergency  room at once.  NOTE: This medicine is only for you. Do not share this medicine with others.  What if I miss a dose?  If you miss a dose, take it as soon as you can. If it is almost time for your next dose, take only that dose. Do not take double or extra doses.  What may interact with this medicine?    alcohol    antihistamines for allergy, cough and cold    certain medicines for depression, anxiety, or psychotic disturbances    certain medicines for sleep    MAOIs like Carbex, Eldepryl, Marplan, Nardil, and Parnate    muscle relaxants    narcotic medicines (opiates) for pain    tramadol  This list may not describe all possible interactions. Give your health care provider a list of all the medicines, herbs, non-prescription drugs, or dietary supplements you use. Also tell them if you smoke, drink alcohol, or use illegal drugs. Some items may interact with your medicine.  What should I watch for while using this medicine?  You may develop tolerance to this medicine if you take it for a long time. Tolerance means that you will get less cough relief with time. Tell your doctor or health care professional if your symptoms do not improve or if they get worse. If you have a high fever, skin rash, or headache, see your health care professional.  Do not suddenly stop taking your medicine because you may develop a severe reaction. Your body becomes used to the medicine. This does NOT mean you are addicted. Addiction is a behavior related to getting and using a drug for a non-medical reason. If your doctor wants you to stop the medicine, the dose will be slowly lowered over time to avoid any side effects.  Drink several glasses of water each day.  You may get drowsy or dizzy. Do not drive, use machinery, or do anything that needs mental alertness until you know how this medicine affects you. Do not stand or sit up quickly, especially if you are an older patient. This reduces the risk of dizzy or fainting spells. Alcohol may  interfere with the effect of this medicine. Avoid alcoholic drinks.  Children may be at higher risk for side effects. If your child has slow breathing, noisy breathing, confusion, or unusual sleepiness, stop giving this medicine and get medical help right away.  The medicine may cause constipation. Try to have a bowel movement at least every 2 to 3 days. If you do not have a bowel movement for 3 days, call your doctor or health care professional.  What side effects may I notice from receiving this medicine?  Side effects that you should report to your doctor or health care professional as soon as possible:    allergic reactions like skin rash, itching or hives, swelling of the face, lips, or tongue    breathing problems    confusion    hallucinations    irregular heartbeat    seizures    trouble passing urine  Side effects that usually do not require medical attention (report to your doctor or health care professional if they continue or are bothersome):    blurred vision    constipation    flushing or sweating    headache    stomach upset, nausea  This list may not describe all possible side effects. Call your doctor for medical advice about side effects. You may report side effects to FDA at 7-070-FDA-3891.  Where should I keep my medicine?  Keep out of the reach of children. This medicine can be abused. Keep your medicine in a safe place to protect it from theft. Do not share this medicine with anyone. Selling or giving away this medicine is dangerous and against the law.  Store at room temperature between 15 and 30 degrees C (59 and 86 degrees F). Protect from light.  Throw away any unused medicine after the expiration date. Discard unused medicine and used packaging carefully. Pets and children can be harmed if they find used or lost packages.  NOTE: This sheet is a summary. It may not cover all possible information. If you have questions about this medicine, talk to your doctor, pharmacist, or health care  "provider.  NOTE:This sheet is a summary. It may not cover all possible information. If you have questions about this medicine, talk to your doctor, pharmacist, or health care provider. Copyright  2016 [employer] Standard                Follow-ups after your visit        Who to contact     If you have questions or need follow up information about today's clinic visit or your schedule please contact [facility] directly at [phone].  Normal or non-critical lab and imaging results will be communicated to you by [employer], letter or phone within 4 business days after the clinic has received the results. If you do not hear from us within 7 days, please contact the clinic through [employer] or phone. If you have a critical or abnormal lab result, we will notify you by phone as soon as possible.  Submit refill requests through [employer] or call your pharmacy and they will forward the refill request to us. Please allow 3 business days for your refill to be completed.          Additional Information About Your Visit        [employer] Information     [employer] gives you secure access to your electronic health record. If you see a primary care provider, you can also send messages to your care team and make appointments. If you have questions, please call your primary care clinic.  If you do not have a primary care provider, please call [phone] and they will assist you.        Care EveryWhere ID     This is your Care EveryWhere ID. This could be used by other organizations to access your [facility] medical records  [id number]        Your Vitals Were     Pulse Temperature Respirations Height Pulse Oximetry BMI (Body Mass Index)    95 99.3  F (37.4  C) (Tympanic) 18 5' 8\" (1.727 m) 95% 50.42 kg/m2       Blood Pressure from Last 3 Encounters:   09/12/17 110/80   09/05/17 138/88   08/09/17 115/68    Weight from Last 3 Encounters:   09/12/17 (!) 331 lb 9.6 oz (150.4 kg)   09/05/17 (!) 339 lb (153.8 kg)   08/09/17 (!) 334 lb " (151.5 kg)              We Performed the Following     Strep, Rapid Screen          Today's Medication Changes          These changes are accurate as of: 9/12/17 10:59 AM.  If you have any questions, ask your nurse or doctor.               Start taking these medicines.        Dose/Directions    azithromycin 250 MG tablet   Commonly known as:  ZITHROMAX   Used for:  Acute bronchitis with symptoms > 10 days   Started by:  Yrn Lopez MD        Two tablets first day, then one tablet daily for four days.   Quantity:  6 tablet   Refills:  0       guaiFENesin-codeine 100-10 MG/5ML Soln solution   Commonly known as:  ROBITUSSIN AC   Used for:  Acute bronchitis with symptoms > 10 days   Started by:  Yrn Lopez MD        Dose:  1 tsp.   Take 5 mLs by mouth every 6 hours as needed for cough   Quantity:  180 mL   Refills:  0       predniSONE 20 MG tablet   Commonly known as:  DELTASONE   Used for:  Acute bronchitis with symptoms > 10 days   Started by:  Yrn Lopez MD        Dose:  40 mg   Take 2 tablets (40 mg) by mouth daily for 5 days   Quantity:  10 tablet   Refills:  0            Where to get your medicines      [id number] - [place], [place] - [address] [address]     Phone:  [phone]     azithromycin 250 MG tablet    predniSONE 20 MG tablet         Some of these will need a paper prescription and others can be bought over the counter.  Ask your nurse if you have questions.     Bring a paper prescription for each of these medications     guaiFENesin-codeine 100-10 MG/5ML Soln solution                Primary Care Provider Office Phone # Fax #    Yrn Lopez [phone]       [address] [facility] [address]        Equal Access to Services     [facility] JULIENNE AH: Azucena Saldana, erika martines, qaatif stephens. So [facility]  [phone].    ATENCIÓN: Si david arguelles, tiene a ceja disposición servicios gratuitos de asistencia lingüística. Lynette paris [phone].    We comply with applicable federal civil rights laws and [place] laws. We do not discriminate on the basis of race, color, national origin, age, disability sex, sexual orientation or gender identity.            Thank you!     Thank you for choosing [facility]  for your care. Our goal is always to provide you with excellent care. Hearing back from our patients is one way we can continue to improve our services. Please take a few minutes to complete the written survey that you may receive in the mail after your visit with us. Thank you!             Your Updated Medication List - Protect others around you: Learn how to safely use, store and throw away your medicines at www.disposemymeds.org.          This list is accurate as of: 9/12/17 10:59 AM.  Always use your most recent med list.                   Brand Name Dispense Instructions for use Diagnosis    azithromycin 250 MG tablet    ZITHROMAX    6 tablet    Two tablets first day, then one tablet daily for four days.    Acute bronchitis with symptoms > 10 days       fluticasone 50 MCG/ACT spray    FLONASE    1 Package    Spray 1-2 sprays into both nostrils daily as needed for rhinitis    Chronic nasal congestion       guaiFENesin-codeine 100-10 MG/5ML Soln solution    ROBITUSSIN AC    180 mL    Take 5 mLs by mouth every 6 hours as needed for cough    Acute bronchitis with symptoms > 10 days       levonorgestrel 20 MCG/24HR IUD    MIRENA     1 each by Intrauterine route once        predniSONE 20 MG tablet    DELTASONE    10 tablet    Take 2 tablets (40 mg) by mouth daily for 5 days    Acute bronchitis with symptoms > 10 days       VITAMIN D PO      Take 4,000 Units by mouth daily

## 2017-09-12 NOTE — PROGRESS NOTES
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 Selling or giving away this medicine is dangerous and against the law.  Store at room temperature between 15 and 30 degrees C (59 and 86 degrees F). Protect from light.  Throw away any unused medicine after the expiration date. Discard unused medicine and used packaging carefully. Pets and children can be harmed if they find used or lost packages.  NOTE: This sheet is a summary. It may not cover all possible information. If you have questions about this medicine, talk to your doctor, pharmacist, or health care provider.  NOTE:This sheet is a summary. It may not cover all possible information. If you have questions about this medicine, talk to your doctor, pharmacist, or health care provider. Copyright  2016 [employer]            Yrn Lopez MD  [facility]

## 2017-09-12 NOTE — LETTER
September 12, 2017      Anne Cortez  [address] [address] [facility] [id number]          To Whom It May Concern:      Anne Cortez was seen in our clinic. She may can go back to school tomorrow (09/13/2017) if she feels better.         Sincerely,          Yrn Lopez MD

## 2017-09-12 NOTE — PATIENT INSTRUCTIONS
Acute Bronchitis  Your healthcare provider has told you that you have acute bronchitis. Bronchitis is infection or inflammation of the bronchial tubes (airways in the lungs). Normally, air moves easily in and out of the airways. Bronchitis narrows the airways, making it harder for air to flow in and out of the lungs. This causes symptoms such as shortness of breath, coughing up yellow or green mucus, and wheezing. Bronchitis can be acute or chronic. Acute means the condition comes on quickly and goes away in a short time, usually within 3 to 10 days. Chronic means a condition lasts a long time and often comes back.    What causes acute bronchitis?  Acute bronchitis almost always starts as a viral respiratory infection, such as a cold or the flu. Certain factors make it more likely for a cold or flu to turn into bronchitis. These include being very young, being elderly, having a heart or lung problem, or having a weak immune system. Cigarette smoking also makes bronchitis more likely.  When bronchitis develops, the airways become swollen. The airways may also become infected with bacteria. This is known as a secondary infection.  Diagnosing acute bronchitis  Your healthcare provider will examine you and ask about your symptoms and health history. You may also have a sputum culture to test the fluid in your lungs. Chest X-rays may be done to look for infection in the lungs.  Treating acute bronchitis  Bronchitis usually clears up as the cold or flu goes away. You can help feel better faster by doing the following:    Take medicine as directed. You may be told to take ibuprofen or other over-the-counter medicines. These help relieve inflammation in your bronchial tubes. Your healthcare provider may prescribe an inhaler to help open up the bronchial tubes. Most of the time, acute bronchitis is caused by a viral infection. Antibiotics are usually not prescribed for viral infections.    Drink plenty of fluids, such as  water, juice, or warm soup. Fluids loosen mucus so that you can cough it up. This helps you breathe more easily. Fluids also prevent dehydration.    Make sure you get plenty of rest.    Do not smoke. Do not allow anyone else to smoke in your home.  Recovery and follow-up  Follow up with your doctor as you are told. You will likely feel better in a week or two. But a dry cough can linger beyond that time. Let your doctor know if you still have symptoms (other than a dry cough) after 2 weeks, or if you re prone to getting bronchial infections. Take steps to protect yourself from future infections. These steps include stopping smoking and avoiding tobacco smoke, washing your hands often, and getting a yearly flu shot.  When to call your healthcare provider  Call the healthcare provider if you have any of the following:    Fever of 100.4 F (38.0 C) or higher, or as advised    Symptoms that get worse, or new symptoms    Trouble breathing    Symptoms that don t start to improve within a week, or within 3 days of taking antibiotics   Date Last Reviewed: 12/1/2016 2000-2017 [employer]. [address] [place] [address]. All rights reserved. This information is not intended as a substitute for professional medical care. Always follow your healthcare professional's instructions.        Patient Education    Azithromycin Ophthalmic drops, solution    Azithromycin Oral suspension    Azithromycin Oral suspension, extended release    Azithromycin Oral tablet    Azithromycin Solution for injection  Azithromycin Oral tablet  What is this medicine?  AZITHROMYCIN (az ith jose MYE sin) is a macrolide antibiotic. It is used to treat or prevent certain kinds of bacterial infections. It will not work for colds, flu, or other viral infections.  This medicine may be used for other purposes; ask your health care provider or pharmacist if you have questions.  What should I tell my health care provider before I take  this medicine?  They need to know if you have any of these conditions:    kidney disease    liver disease    irregular heartbeat or heart disease    an unusual or allergic reaction to azithromycin, erythromycin, other macrolide antibiotics, foods, dyes, or preservatives    pregnant or trying to get pregnant    breast-feeding  How should I use this medicine?  Take this medicine by mouth with a full glass of water. Follow the directions on the prescription label. The tablets can be taken with food or on an empty stomach. If the medicine upsets your stomach, take it with food. Take your medicine at regular intervals. Do not take your medicine more often than directed. Take all of your medicine as directed even if you think your are better. Do not skip doses or stop your medicine early.  Talk to your pediatrician regarding the use of this medicine in children. Special care may be needed.  Overdosage: If you think you have taken too much of this medicine contact a poison control center or emergency room at once.  NOTE: This medicine is only for you. Do not share this medicine with others.  What if I miss a dose?  If you miss a dose, take it as soon as you can. If it is almost time for your next dose, take only that dose. Do not take double or extra doses.  What may interact with this medicine?  Do not take this medicine with any of the following medications:    lincomycin  This medicine may also interact with the following medications:    amiodarone    antacids    birth control pills    cyclosporine    digoxin    magnesium    nelfinavir    phenytoin    warfarin  This list may not describe all possible interactions. Give your health care provider a list of all the medicines, herbs, non-prescription drugs, or dietary supplements you use. Also tell them if you smoke, drink alcohol, or use illegal drugs. Some items may interact with your medicine.  What should I watch for while using this medicine?  Tell your doctor or  health care professional if your symptoms do not improve.  Do not treat diarrhea with over the counter products. Contact your doctor if you have diarrhea that lasts more than 2 days or if it is severe and watery.  This medicine can make you more sensitive to the sun. Keep out of the sun. If you cannot avoid being in the sun, wear protective clothing and use sunscreen. Do not use sun lamps or tanning beds/booths.  What side effects may I notice from receiving this medicine?  Side effects that you should report to your doctor or health care professional as soon as possible:    allergic reactions like skin rash, itching or hives, swelling of the face, lips, or tongue    confusion, nightmares or hallucinations    dark urine    difficulty breathing    hearing loss    irregular heartbeat or chest pain    pain or difficulty passing urine    redness, blistering, peeling or loosening of the skin, including inside the mouth    white patches or sores in the mouth    yellowing of the eyes or skin  Side effects that usually do not require medical attention (report to your doctor or health care professional if they continue or are bothersome):    diarrhea    dizziness, drowsiness    headache    stomach upset or vomiting    tooth discoloration    vaginal irritation  This list may not describe all possible side effects. Call your doctor for medical advice about side effects. You may report side effects to FDA at 3-737-FDA-6569.  Where should I keep my medicine?  Keep out of the reach of children.  Store at room temperature between 15 and 30 degrees C (59 and 86 degrees F). Throw away any unused medicine after the expiration date.  NOTE:This sheet is a summary. It may not cover all possible information. If you have questions about this medicine, talk to your doctor, pharmacist, or health care provider. Copyright  2016 [employer]        Patient Education    Prednisone Gastro-resistant tablet    Prednisone Oral  solution    Prednisone Oral tablet  Prednisone Oral tablet  What is this medicine?  PREDNISONE (PRED ni sone) is a corticosteroid. It is commonly used to treat inflammation of the skin, joints, lungs, and other organs. Common conditions treated include asthma, allergies, and arthritis. It is also used for other conditions, such as blood disorders and diseases of the adrenal glands.  This medicine may be used for other purposes; ask your health care provider or pharmacist if you have questions.  What should I tell my health care provider before I take this medicine?  They need to know if you have any of these conditions:    Cushing's syndrome    diabetes    glaucoma    heart disease    high blood pressure    infection (especially a virus infection such as chickenpox, cold sores, or herpes)    kidney disease    liver disease    mental illness    myasthenia gravis    osteoporosis    seizures    stomach or intestine problems    thyroid disease    an unusual or allergic reaction to lactose, prednisone, other medicines, foods, dyes, or preservatives    pregnant or trying to get pregnant    breast-feeding  How should I use this medicine?  Take this medicine by mouth with a glass of water. Follow the directions on the prescription label. Take this medicine with food. If you are taking this medicine once a day, take it in the morning. Do not take more medicine than you are told to take. Do not suddenly stop taking your medicine because you may develop a severe reaction. Your doctor will tell you how much medicine to take. If your doctor wants you to stop the medicine, the dose may be slowly lowered over time to avoid any side effects.  Talk to your pediatrician regarding the use of this medicine in children. Special care may be needed.  Overdosage: If you think you have taken too much of this medicine contact a poison control center or emergency room at once.  NOTE: This medicine is only for you. Do not share this medicine  with others.  What if I miss a dose?  If you miss a dose, take it as soon as you can. If it is almost time for your next dose, talk to your doctor or health care professional. You may need to miss a dose or take an extra dose. Do not take double or extra doses without advice.  What may interact with this medicine?  Do not take this medicine with any of the following medications:    metyrapone    mifepristone  This medicine may also interact with the following medications:    aminoglutethimide    amphotericin B    aspirin and aspirin-like medicines    barbiturates    certain medicines for diabetes, like glipizide or glyburide    cholestyramine    cholinesterase inhibitors    cyclosporine    digoxin    diuretics    ephedrine    female hormones, like estrogens and birth control pills    isoniazid    ketoconazole    NSAIDS, medicines for pain and inflammation, like ibuprofen or naproxen    phenytoin    rifampin    toxoids    vaccines    warfarin  This list may not describe all possible interactions. Give your health care provider a list of all the medicines, herbs, non-prescription drugs, or dietary supplements you use. Also tell them if you smoke, drink alcohol, or use illegal drugs. Some items may interact with your medicine.  What should I watch for while using this medicine?  Visit your doctor or health care professional for regular checks on your progress. If you are taking this medicine over a prolonged period, carry an identification card with your name and address, the type and dose of your medicine, and your doctor's name and address.  This medicine may increase your risk of getting an infection. Tell your doctor or health care professional if you are around anyone with measles or chickenpox, or if you develop sores or blisters that do not heal properly.  If you are going to have surgery, tell your doctor or health care professional that you have taken this medicine within the last twelve months.  Ask your  doctor or health care professional about your diet. You may need to lower the amount of salt you eat.  This medicine may affect blood sugar levels. If you have diabetes, check with your doctor or health care professional before you change your diet or the dose of your diabetic medicine.  What side effects may I notice from receiving this medicine?  Side effects that you should report to your doctor or health care professional as soon as possible:    allergic reactions like skin rash, itching or hives, swelling of the face, lips, or tongue    changes in emotions or moods    changes in vision    depressed mood    eye pain    fever or chills, cough, sore throat, pain or difficulty passing urine    increased thirst    swelling of ankles, feet  Side effects that usually do not require medical attention (report to your doctor or health care professional if they continue or are bothersome):    confusion, excitement, restlessness    headache    nausea, vomiting    skin problems, acne, thin and shiny skin    trouble sleeping    weight gain  This list may not describe all possible side effects. Call your doctor for medical advice about side effects. You may report side effects to FDA at 6-864-FDA-7861.  Where should I keep my medicine?  Keep out of the reach of children.  Store at room temperature between 15 and 30 degrees C (59 and 86 degrees F). Protect from light. Keep container tightly closed. Throw away any unused medicine after the expiration date.  NOTE:This sheet is a summary. It may not cover all possible information. If you have questions about this medicine, talk to your doctor, pharmacist, or health care provider. Copyright  2016 [employer]        Patient Education    Codeine Phosphate, Guaifenesin Oral solution    Codeine Phosphate, Guaifenesin Oral syrup    Codeine Phosphate, Guaifenesin Oral tablet  Codeine Phosphate, Guaifenesin Oral syrup  What is this medicine?  CODEINE; GUAIFENESIN (SHEY MENDOZA e  sin) is a cough suppressant and expectorant. It helps to stop or reduce coughing due to the common cold or inhaled irritants. It will not treat an infection.  This medicine may be used for other purposes; ask your health care provider or pharmacist if you have questions.  What should I tell my health care provider before I take this medicine?  They need to know if you have any of these conditions:    Swift's disease    convulsions    drug or alcohol abuse or addiction    enlarged prostate    fever    intestinal or stomach problems    kidney disease    liver disease    lung disease like asthma or emphysema    recent surgery or injury    thyroid disease    an allergic or unusual reaction to codeine, hydromorphone, hydrocodone, oxycodone, morphine, guaifenesin, other medicines, foods, dyes, or preservatives    pregnant or trying to get pregnant    breast-feeding  How should I use this medicine?  Take this medicine by mouth with a full glass of water. Follow the directions on the prescription label. Use a specially marked spoon or container to measure your medicine. Ask your pharmacist if you do not have one. Household spoons are not accurate. Take this medicine with food or milk if it upsets your stomach. Take your doses at regular times. Do not take more medicine than directed.  Talk to your pediatrician regarding the use of this medicine in children. Special care may be needed.  Overdosage: If you think you have taken too much of this medicine contact a poison control center or emergency room at once.  NOTE: This medicine is only for you. Do not share this medicine with others.  What if I miss a dose?  If you miss a dose, take it as soon as you can. If it is almost time for your next dose, take only that dose. Do not take double or extra doses.  What may interact with this medicine?    alcohol    antihistamines for allergy, cough and cold    certain medicines for depression, anxiety, or psychotic  disturbances    certain medicines for sleep    MAOIs like Carbex, Eldepryl, Marplan, Nardil, and Parnate    muscle relaxants    narcotic medicines (opiates) for pain    tramadol  This list may not describe all possible interactions. Give your health care provider a list of all the medicines, herbs, non-prescription drugs, or dietary supplements you use. Also tell them if you smoke, drink alcohol, or use illegal drugs. Some items may interact with your medicine.  What should I watch for while using this medicine?  You may develop tolerance to this medicine if you take it for a long time. Tolerance means that you will get less cough relief with time. Tell your doctor or health care professional if your symptoms do not improve or if they get worse. If you have a high fever, skin rash, or headache, see your health care professional.  Do not suddenly stop taking your medicine because you may develop a severe reaction. Your body becomes used to the medicine. This does NOT mean you are addicted. Addiction is a behavior related to getting and using a drug for a non-medical reason. If your doctor wants you to stop the medicine, the dose will be slowly lowered over time to avoid any side effects.  Drink several glasses of water each day.  You may get drowsy or dizzy. Do not drive, use machinery, or do anything that needs mental alertness until you know how this medicine affects you. Do not stand or sit up quickly, especially if you are an older patient. This reduces the risk of dizzy or fainting spells. Alcohol may interfere with the effect of this medicine. Avoid alcoholic drinks.  Children may be at higher risk for side effects. If your child has slow breathing, noisy breathing, confusion, or unusual sleepiness, stop giving this medicine and get medical help right away.  The medicine may cause constipation. Try to have a bowel movement at least every 2 to 3 days. If you do not have a bowel movement for 3 days, call your  doctor or health care professional.  What side effects may I notice from receiving this medicine?  Side effects that you should report to your doctor or health care professional as soon as possible:    allergic reactions like skin rash, itching or hives, swelling of the face, lips, or tongue    breathing problems    confusion    hallucinations    irregular heartbeat    seizures    trouble passing urine  Side effects that usually do not require medical attention (report to your doctor or health care professional if they continue or are bothersome):    blurred vision    constipation    flushing or sweating    headache    stomach upset, nausea  This list may not describe all possible side effects. Call your doctor for medical advice about side effects. You may report side effects to FDA at 9-270-FDA-0294.  Where should I keep my medicine?  Keep out of the reach of children. This medicine can be abused. Keep your medicine in a safe place to protect it from theft. Do not share this medicine with anyone. Selling or giving away this medicine is dangerous and against the law.  Store at room temperature between 15 and 30 degrees C (59 and 86 degrees F). Protect from light.  Throw away any unused medicine after the expiration date. Discard unused medicine and used packaging carefully. Pets and children can be harmed if they find used or lost packages.  NOTE: This sheet is a summary. It may not cover all possible information. If you have questions about this medicine, talk to your doctor, pharmacist, or health care provider.  NOTE:This sheet is a summary. It may not cover all possible information. If you have questions about this medicine, talk to your doctor, pharmacist, or health care provider. Copyright  2016 [employer]

## 2017-09-12 NOTE — LETTER
September 13, 2017      Anne Cortez  [address] [facility]  [place] [address]        Dear Anne,     The results of your recent throat culture were negative.  If you have any questions or concerns please call your care team at the number listed at the top of this letter.          Sincerely,        Yrn Lopez MD

## 2017-09-12 NOTE — NURSING NOTE
"Chief Complaint   Patient presents with     URI       Initial /80 (Cuff Size: Adult Large)  Pulse 95  Temp 99.3  F (37.4  C) (Tympanic)  Resp 18  Ht 5' 8\" (1.727 m)  Wt (!) 331 lb 9.6 oz (150.4 kg)  SpO2 95%  BMI 50.42 kg/m2 Estimated body mass index is 50.42 kg/(m^2) as calculated from the following:    Height as of this encounter: 5' 8\" (1.727 m).    Weight as of this encounter: 331 lb 9.6 oz (150.4 kg).  Medication Reconciliation: complete    Health Maintenance that is potentially due pending provider review:  NONE    n/a    Is there anyone who you would like to be able to receive your results? Not Applicable  If yes have patient fill out LOIS    "

## 2017-09-13 LAB
BACTERIA SPEC CULT: NORMAL
SPECIMEN SOURCE: NORMAL

## 2018-03-20 ENCOUNTER — E-VISIT (OUTPATIENT)
Dept: FAMILY MEDICINE | Facility: CLINIC | Age: 43
End: 2018-03-20
Payer: COMMERCIAL

## 2018-03-20 DIAGNOSIS — J01.90 ACUTE SINUSITIS WITH SYMPTOMS > 10 DAYS: Primary | ICD-10-CM

## 2018-03-20 PROCEDURE — 99444 ZZC PHYSICIAN ONLINE EVALUATION & MANAGEMENT SERVICE: CPT | Performed by: FAMILY MEDICINE

## 2018-03-20 NOTE — MR AVS SNAPSHOT
After Visit Summary   3/20/2018    Anne Cortez    MRN: [id number]           Patient Information     Date Of Birth          1975        Visit Information        Provider Department      3/20/2018 7:34 AM Yrn Lopez MD [facility]        Today's Diagnoses     Acute sinusitis with symptoms > 10 days    -  1      Care Instructions    Augmentin prescribed, continue well hydration, warm fluids, steam inhalation.  You can also try Flonase, over-the-counter.  Follow-up if symptoms persist or worsen.      Yrn Lopez MD  [facility]             Follow-ups after your visit        Who to contact     If you have questions or need follow up information about today's clinic visit or your schedule please contact [facility] directly at [phone].  Normal or non-critical lab and imaging results will be communicated to you by MyChart, letter or phone within 4 business days after the clinic has received the results. If you do not hear from us within 7 days, please contact the clinic through MyChart or phone. If you have a critical or abnormal lab result, we will notify you by phone as soon as possible.  Submit refill requests through [employer] or call your pharmacy and they will forward the refill request to us. Please allow 3 business days for your refill to be completed.          Additional Information About Your Visit        MyChart Information     [employer] gives you secure access to your electronic health record. If you see a primary care provider, you can also send messages to your care team and make appointments. If you have questions, please call your primary care clinic.  If you do not have a primary care provider, please call [phone] and they will assist you.        Care EveryWhere ID     This is your Care EveryWhere ID. This could be used by other organizations to access your [facility] medical records  [id number]         Blood  Pressure from Last 3 Encounters:   09/12/17 110/80   09/05/17 138/88   08/09/17 115/68    Weight from Last 3 Encounters:   09/12/17 (!) 331 lb 9.6 oz (150.4 kg)   09/05/17 (!) 339 lb (153.8 kg)   08/09/17 (!) 334 lb (151.5 kg)              Today, you had the following     No orders found for display         Today's Medication Changes          These changes are accurate as of 3/20/18 12:01 PM.  If you have any questions, ask your nurse or doctor.               Start taking these medicines.        Dose/Directions    amoxicillin-clavulanate 875-125 MG per tablet   Commonly known as:  AUGMENTIN   Used for:  Acute sinusitis with symptoms > 10 days        Dose:  1 tablet   Take 1 tablet by mouth 2 times daily   Quantity:  20 tablet   Refills:  0            Where to get your medicines      These medications were sent to [facility] [address]  [address] [address]     Phone:  [phone]     amoxicillin-clavulanate 875-125 MG per tablet                Primary Care Provider Office Phone # Fax #    Yrn Ur MD Jessica [phone]       [address] [facility] [address] [address]        Equal Access to Services     BROOKS ROBINS AH: Hadii alicia ku hadasho Soomaali, waaxda luqadaha, qaybta kaalmada adeegyada, waxay raizain ruiz yarbrough. So [facility] [phone].    ATENCIÓN: Si habla español, tiene a ceja disposición servicios gratuitos de asistencia lingüística. Llame al [phone].    We comply with applicable federal civil rights laws and [place] laws. We do not discriminate on the basis of race, color, national origin, age, disability, sex, sexual orientation, or gender identity.            Thank you!     Thank you for choosing [facility]  for your care. Our goal is always to provide you with excellent care. Hearing back from our patients is one way we can continue to improve our services. Please take a few minutes to complete the written  survey that you may receive in the mail after your visit with us. Thank you!             Your Updated Medication List - Protect others around you: Learn how to safely use, store and throw away your medicines at www.disposemymeds.org.          This list is accurate as of 3/20/18 12:01 PM.  Always use your most recent med list.                   Brand Name Dispense Instructions for use Diagnosis    amoxicillin-clavulanate 875-125 MG per tablet    AUGMENTIN    20 tablet    Take 1 tablet by mouth 2 times daily    Acute sinusitis with symptoms > 10 days       azithromycin 250 MG tablet    ZITHROMAX    6 tablet    Two tablets first day, then one tablet daily for four days.    Acute bronchitis with symptoms > 10 days       fluticasone 50 MCG/ACT spray    FLONASE    1 Package    Spray 1-2 sprays into both nostrils daily as needed for rhinitis    Chronic nasal congestion       guaiFENesin-codeine 100-10 MG/5ML Soln solution    ROBITUSSIN AC    180 mL    Take 5 mLs by mouth every 6 hours as needed for cough    Acute bronchitis with symptoms > 10 days       levonorgestrel 20 MCG/24HR IUD    MIRENA     1 each by Intrauterine route once        VITAMIN D PO      Take 4,000 Units by mouth daily

## 2018-03-20 NOTE — PATIENT INSTRUCTIONS
Augmentin prescribed, continue well hydration, warm fluids, steam inhalation.  You can also try Flonase, over-the-counter.  Follow-up if symptoms persist or worsen.      Yrn Lopez MD  [facility]

## 2018-04-27 ENCOUNTER — OFFICE VISIT (OUTPATIENT)
Dept: FAMILY MEDICINE | Facility: CLINIC | Age: 43
End: 2018-04-27
Payer: COMMERCIAL

## 2018-04-27 VITALS
DIASTOLIC BLOOD PRESSURE: 84 MMHG | BODY MASS INDEX: 44.41 KG/M2 | RESPIRATION RATE: 18 BRPM | WEIGHT: 293 LBS | HEIGHT: 68 IN | HEART RATE: 64 BPM | TEMPERATURE: 98.4 F | SYSTOLIC BLOOD PRESSURE: 122 MMHG

## 2018-04-27 DIAGNOSIS — K04.7 DENTAL INFECTION: Primary | ICD-10-CM

## 2018-04-27 PROCEDURE — 99213 OFFICE O/P EST LOW 20 MIN: CPT | Performed by: FAMILY MEDICINE

## 2018-04-27 ASSESSMENT — ANXIETY QUESTIONNAIRES
3. WORRYING TOO MUCH ABOUT DIFFERENT THINGS: NOT AT ALL
5. BEING SO RESTLESS THAT IT IS HARD TO SIT STILL: NOT AT ALL
1. FEELING NERVOUS, ANXIOUS, OR ON EDGE: NOT AT ALL
2. NOT BEING ABLE TO STOP OR CONTROL WORRYING: NOT AT ALL
IF YOU CHECKED OFF ANY PROBLEMS ON THIS QUESTIONNAIRE, HOW DIFFICULT HAVE THESE PROBLEMS MADE IT FOR YOU TO DO YOUR WORK, TAKE CARE OF THINGS AT HOME, OR GET ALONG WITH OTHER PEOPLE: NOT DIFFICULT AT ALL
GAD7 TOTAL SCORE: 2
7. FEELING AFRAID AS IF SOMETHING AWFUL MIGHT HAPPEN: NOT AT ALL
6. BECOMING EASILY ANNOYED OR IRRITABLE: SEVERAL DAYS

## 2018-04-27 ASSESSMENT — PATIENT HEALTH QUESTIONNAIRE - PHQ9: 5. POOR APPETITE OR OVEREATING: SEVERAL DAYS

## 2018-04-27 NOTE — NURSING NOTE
"Chief Complaint   Patient presents with     Dental Problem       Initial /84 (Cuff Size: Adult Large)  Pulse 64  Temp 98.4  F (36.9  C) (Tympanic)  Resp 18  Ht 5' 8\" (1.727 m)  Wt (!) 343 lb (155.6 kg)  Breastfeeding? No  BMI 52.15 kg/m2 Estimated body mass index is 52.15 kg/(m^2) as calculated from the following:    Height as of this encounter: 5' 8\" (1.727 m).    Weight as of this encounter: 343 lb (155.6 kg).      Health Maintenance that is potentially due pending provider review:  PHQ9    n/a    Is there anyone who you would like to be able to receive your results? Not Applicable  If yes have patient fill out LOIS    "

## 2018-04-27 NOTE — PATIENT INSTRUCTIONS
"  Dental Abscess   A dental abscess is an infection of the tooth socket. It often starts with a crack or cavity in the tooth. A pocket of pus forms between the tooth and the bone. The infection causes pain and swelling of the gum, cheek, or jaw. The pain is often made worse by drinking hot or cold fluids, or biting on hard foods. Pain may be felt in the facial sinus or in the ear. A severe infection can interfere with swallowing and breathing.  Causes    Cavities    Trauma    Previous dental work  Symptoms    Pain    Swelling around the tooth or face and cheek    Redness    Bad breath    Bad taste in the mouth    Fever  You will be started on an antibiotic. However, final treatment requires drainage of the pus. This can be done by removing the tooth or performing a root canal. A root canal is done by an oral surgeon. It involves drilling an opening in the tooth to drain the pus. After the infection has healed, a crown is placed over the tooth.  Home care  The following guidelines will help you care for your abscess at home:    Don't have hot or cold foods and liquids. Your tooth may be sensitive to temperature changes.    If your tooth is chipped or cracked, or if there is a large open cavity, apply oil of cloves (available over-the-counter in drugstores) directly to the tooth to reduce pain. Some drugstores carry an over-the-counter \"toothache kit.\" This contains oil of cloves and a paste, which can be applied over the exposed tooth to decrease sensitivity.    Apply an ice pack (ice cubes in a plastic bag, wrapped in a towel) over the injured area for 10 to 20 minutes every 1 to 2 hours the first day for pain relief. Continue this 3 to 4 times a day until the pain and swelling goes away.    You can take acetaminophen or ibuprofen for pain, unless you were given a different pain medicine to use. If you have chronic liver or kidney disease, have ever had a stomach ulcer or gastrointestinal bleeding, or are taking " blood-thinning medicines, talk with your healthcare provider before using these medicines.    An antibiotic will be prescribed. Take it as directed until completed, even if you are feeling better sooner.  Follow-up care  Follow up as advised with a dentist or oral surgeon. Even though your pain may improve with the treatment given today, only a dentist or oral surgeon can provide full treatment for this problem.    If a culture was done, you will be notified if the treatment needs to be changed. You can call in as directed for the results.    If X-rays were taken, they will be reviewed by a specialist. You will be notified of the results, especially if they affect treatment.  Call 911  Call 911 if any of these occur:    Trouble breathing or swallowing, or wheezing    Hoarse voice or trouble speaking    Confusion    Extreme drowsiness or trouble awakening    Fainting or loss of consciousness    Rapid heart rate  When to seek medical advice  Call your healthcare provider right away if any of these occur:    Swollen or red face or eyelid    Pain gets worse or spreads to the neck    Fever of 100.4 F (38 C) or higher, or as directed by your healthcare provider    Unusual drowsiness, a headache or stiff neck, or weakness    Pus drains from the gum or tooth    You can't open your mouth wide  Date Last Reviewed: 7/30/2015 2000-2017 [employer]. [address] [place] [address], [place] [address]. All rights reserved. This information is not intended as a substitute for professional medical care. Always follow your healthcare professional's instructions.

## 2018-04-27 NOTE — MR AVS SNAPSHOT
"              After Visit Summary   4/27/2018    Anne Cortez    MRN: [id number]           Patient Information     Date Of Birth          1975        Visit Information        Provider Department      4/27/2018 8:40 AM Yrn Lopez MD [facility]        Today's Diagnoses     Dental infection    -  1      Care Instructions      Dental Abscess   A dental abscess is an infection of the tooth socket. It often starts with a crack or cavity in the tooth. A pocket of pus forms between the tooth and the bone. The infection causes pain and swelling of the gum, cheek, or jaw. The pain is often made worse by drinking hot or cold fluids, or biting on hard foods. Pain may be felt in the facial sinus or in the ear. A severe infection can interfere with swallowing and breathing.  Causes    Cavities    Trauma    Previous dental work  Symptoms    Pain    Swelling around the tooth or face and cheek    Redness    Bad breath    Bad taste in the mouth    Fever  You will be started on an antibiotic. However, final treatment requires drainage of the pus. This can be done by removing the tooth or performing a root canal. A root canal is done by an oral surgeon. It involves drilling an opening in the tooth to drain the pus. After the infection has healed, a crown is placed over the tooth.  Home care  The following guidelines will help you care for your abscess at home:    Don't have hot or cold foods and liquids. Your tooth may be sensitive to temperature changes.    If your tooth is chipped or cracked, or if there is a large open cavity, apply oil of cloves (available over-the-counter in drugstores) directly to the tooth to reduce pain. Some drugstores carry an over-the-counter \"toothache kit.\" This contains oil of cloves and a paste, which can be applied over the exposed tooth to decrease sensitivity.    Apply an ice pack (ice cubes in a plastic bag, wrapped in a towel) over the injured area for 10 to 20 " minutes every 1 to 2 hours the first day for pain relief. Continue this 3 to 4 times a day until the pain and swelling goes away.    You can take acetaminophen or ibuprofen for pain, unless you were given a different pain medicine to use. If you have chronic liver or kidney disease, have ever had a stomach ulcer or gastrointestinal bleeding, or are taking blood-thinning medicines, talk with your healthcare provider before using these medicines.    An antibiotic will be prescribed. Take it as directed until completed, even if you are feeling better sooner.  Follow-up care  Follow up as advised with a dentist or oral surgeon. Even though your pain may improve with the treatment given today, only a dentist or oral surgeon can provide full treatment for this problem.    If a culture was done, you will be notified if the treatment needs to be changed. You can call in as directed for the results.    If X-rays were taken, they will be reviewed by a specialist. You will be notified of the results, especially if they affect treatment.  Call 911  Call 911 if any of these occur:    Trouble breathing or swallowing, or wheezing    Hoarse voice or trouble speaking    Confusion    Extreme drowsiness or trouble awakening    Fainting or loss of consciousness    Rapid heart rate  When to seek medical advice  Call your healthcare provider right away if any of these occur:    Swollen or red face or eyelid    Pain gets worse or spreads to the neck    Fever of 100.4 F (38 C) or higher, or as directed by your healthcare provider    Unusual drowsiness, a headache or stiff neck, or weakness    Pus drains from the gum or tooth    You can't open your mouth wide  Date Last Reviewed: 7/30/2015 2000-2017 [employer]. [address] [place] [address], [place] [address]. All rights reserved. This information is not intended as a substitute for professional medical care. Always follow your healthcare professional's instructions.              "   Follow-ups after your visit        Who to contact     If you have questions or need follow up information about today's clinic visit or your schedule please contact [facility] directly at [phone].  Normal or non-critical lab and imaging results will be communicated to you by MyChart, letter or phone within 4 business days after the clinic has received the results. If you do not hear from us within 7 days, please contact the clinic through MyChart or phone. If you have a critical or abnormal lab result, we will notify you by phone as soon as possible.  Submit refill requests through [facility] or call your pharmacy and they will forward the refill request to us. Please allow 3 business days for your refill to be completed.          Additional Information About Your Visit        [employer] Information     [facility] gives you secure access to your electronic health record. If you see a primary care provider, you can also send messages to your care team and make appointments. If you have questions, please call your primary care clinic.  If you do not have a primary care provider, please call [phone] and they will assist you.        Care EveryWhere ID     This is your Care EveryWhere ID. This could be used by other organizations to access your [facility] medical records  [id number]        Your Vitals Were     Pulse Temperature Respirations Height Breastfeeding? BMI (Body Mass Index)    64 98.4  F (36.9  C) (Tympanic) 18 5' 8\" (1.727 m) No 52.15 kg/m2       Blood Pressure from Last 3 Encounters:   04/27/18 122/84   09/12/17 110/80   09/05/17 138/88    Weight from Last 3 Encounters:   04/27/18 (!) 343 lb (155.6 kg)   09/12/17 (!) 331 lb 9.6 oz (150.4 kg)   09/05/17 (!) 339 lb (153.8 kg)              Today, you had the following     No orders found for display         Today's Medication Changes          These changes are accurate as of 4/27/18  9:05 AM.  If you have any questions, ask your nurse or " doctor.               Start taking these medicines.        Dose/Directions    acetaminophen-codeine 300-30 MG per tablet   Commonly known as:  TYLENOL #3   Used for:  Dental infection   Started by:  Yrn Lopez MD        Dose:  1 tablet   Take 1 tablet by mouth every 8 hours as needed for severe pain maximum 6 tablet(s) per day   Quantity:  6 tablet   Refills:  0       amoxicillin-clavulanate 875-125 MG per tablet   Commonly known as:  AUGMENTIN   Used for:  Dental infection   Started by:  Yrn Lopez MD        Dose:  1 tablet   Take 1 tablet by mouth 2 times daily   Quantity:  20 tablet   Refills:  0            Where to get your medicines      These medications were sent to [facility] [place], [place] - [address]  [address] [address]     Phone:  [phone]     amoxicillin-clavulanate 875-125 MG per tablet         Some of these will need a paper prescription and others can be bought over the counter.  Ask your nurse if you have questions.     Bring a paper prescription for each of these medications     acetaminophen-codeine 300-30 MG per tablet               Information about OPIOIDS     PRESCRIPTION OPIOIDS: WHAT YOU NEED TO KNOW   You have a prescription for an opioid (narcotic) pain medicine. Opioids can cause addiction. If you have a history of chemical dependency of any type, you are at a higher risk of becoming addicted to opioids. Only take this medicine after all other options have been tried. Take it for as short a time and as few doses as possible.     Do not:    Drive. If you drive while taking these medicines, you could be arrested for driving under the influence (DUI).    Operate heavy machinery    Do any other dangerous activities while taking these medicines.     Drink any alcohol while taking these medicines.      Take with any other medicines that contain acetaminophen. Read all labels carefully. Look for the word  acetaminophen  or  Tylenol.  Ask  your pharmacist if you have questions or are unsure.    Store your pills in a secure place, locked if possible. We will not replace any lost or stolen medicine. If you don t finish your medicine, please throw away (dispose) as directed by your pharmacist. The [place] [employer]ution [employer] has more information about safe disposal: https://www.pca.[facility].[place].us/living-green/managing-unwanted-medications    All opioids tend to cause constipation. Drink plenty of water and eat foods that have a lot of fiber, such as fruits, vegetables, prune juice, apple juice and high-fiber cereal. Take a laxative (Miralax, milk of magnesia, Colace, Senna) if you don t move your bowels at least every other day.          Primary Care Provider Office Phone # Fax #    Yrn Vines MD Jessica [phone]       [address] [address]        Equal Access to Services     BROOKS [facility] CenterMEÑO : Hadii alicia jacobs hadasho Soritika, waaxda luqadaha, qaybta kaalmada adeegyada, atif aldana . So [facility] [phone].    ATENCIÓN: Si habla español, tiene a ceja disposición servicios gratuitos de asistencia lingüística. Llame al [phone].    We comply with applicable federal civil rights laws and [place] laws. We do not discriminate on the basis of race, color, national origin, age, disability, sex, sexual orientation, or gender identity.            Thank you!     Thank you for choosing [facility]  for your care. Our goal is always to provide you with excellent care. Hearing back from our patients is one way we can continue to improve our services. Please take a few minutes to complete the written survey that you may receive in the mail after your visit with us. Thank you!             Your Updated Medication List - Protect others around you: Learn how to safely use, store and throw away your medicines at www.disposemymeds.org.          This list is accurate as of 4/27/18  9:05 AM.  Always use  your most recent med list.                   Brand Name Dispense Instructions for use Diagnosis    acetaminophen-codeine 300-30 MG per tablet    TYLENOL #3    6 tablet    Take 1 tablet by mouth every 8 hours as needed for severe pain maximum 6 tablet(s) per day    Dental infection       amoxicillin-clavulanate 875-125 MG per tablet    AUGMENTIN    20 tablet    Take 1 tablet by mouth 2 times daily    Dental infection       levonorgestrel 20 MCG/24HR IUD    MIRENA     1 each by Intrauterine route once        VITAMIN D PO      Take 4,000 Units by mouth daily

## 2018-04-28 ASSESSMENT — ANXIETY QUESTIONNAIRES: GAD7 TOTAL SCORE: 2

## 2018-04-28 ASSESSMENT — PATIENT HEALTH QUESTIONNAIRE - PHQ9: SUM OF ALL RESPONSES TO PHQ QUESTIONS 1-9: 2

## 2018-08-01 ENCOUNTER — RADIANT APPOINTMENT (OUTPATIENT)
Dept: GENERAL RADIOLOGY | Facility: CLINIC | Age: 43
End: 2018-08-01
Attending: FAMILY MEDICINE
Payer: COMMERCIAL

## 2018-08-01 ENCOUNTER — OFFICE VISIT (OUTPATIENT)
Dept: FAMILY MEDICINE | Facility: CLINIC | Age: 43
End: 2018-08-01
Payer: COMMERCIAL

## 2018-08-01 VITALS
RESPIRATION RATE: 16 BRPM | HEART RATE: 70 BPM | BODY MASS INDEX: 50.75 KG/M2 | DIASTOLIC BLOOD PRESSURE: 84 MMHG | TEMPERATURE: 98.3 F | OXYGEN SATURATION: 98 % | WEIGHT: 293 LBS | SYSTOLIC BLOOD PRESSURE: 124 MMHG

## 2018-08-01 DIAGNOSIS — M25.551 HIP PAIN, RIGHT: ICD-10-CM

## 2018-08-01 DIAGNOSIS — M25.551 HIP PAIN, RIGHT: Primary | ICD-10-CM

## 2018-08-01 PROCEDURE — 73502 X-RAY EXAM HIP UNI 2-3 VIEWS: CPT | Mod: FY

## 2018-08-01 PROCEDURE — 99213 OFFICE O/P EST LOW 20 MIN: CPT | Performed by: FAMILY MEDICINE

## 2018-08-01 ASSESSMENT — PAIN SCALES - GENERAL: PAINLEVEL: SEVERE PAIN (6)
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
## 2018-08-01 NOTE — MR AVS SNAPSHOT
After Visit Summary   8/1/2018    Anne Cortez    MRN: [id number]           Patient Information     Date Of Birth          1975        Visit Information        Provider Department      8/1/2018 4:00 PM Yrn Lopez MD [facility]        Today's Diagnoses     Hip pain, right    -  1       Follow-ups after your visit        Additional Services     SPORTS MEDICINE REFERRAL       Your provider has referred you to:  [facility]: [facility] [phone]   [email or web address]    Please be aware that coverage of these services is subject to the terms and limitations of your health insurance plan.  Call member services at your health plan with any benefit or coverage questions.      Please bring the following to your appointment:    >>   Any x-rays, CTs or MRIs which have been performed.  Contact the facility where they were done to arrange for  prior to your scheduled appointment.    >>   List of current medications   >>   This referral request   >>   Any documents/labs given to you for this referral                  Who to contact     If you have questions or need follow up information about today's clinic visit or your schedule please contact [facility] directly at [phone].  Normal or non-critical lab and imaging results will be communicated to you by MyChart, letter or phone within 4 business days after the clinic has received the results. If you do not hear from us within 7 days, please contact the clinic through MyChart or phone. If you have a critical or abnormal lab result, we will notify you by phone as soon as possible.  Submit refill requests through [employer] or call your pharmacy and they will forward the refill request to us. Please allow 3 business days for your refill to be completed.          Additional Information About Your Visit        MyChart Information     [employer] gives you secure access to your  electronic health record. If you see a primary care provider, you can also send messages to your care team and make appointments. If you have questions, please call your primary care clinic.  If you do not have a primary care provider, please call [phone] and they will assist you.        Care EveryWhere ID     This is your Care EveryWhere ID. This could be used by other organizations to access your [facility] medical records  [id number]        Your Vitals Were     Pulse Temperature Respirations Pulse Oximetry BMI (Body Mass Index)       70 98.3  F (36.8  C) (Tympanic) 16 98% 50.75 kg/m2        Blood Pressure from Last 3 Encounters:   08/01/18 124/84   04/27/18 122/84   09/12/17 110/80    Weight from Last 3 Encounters:   08/01/18 (!) 333 lb 12.8 oz (151.4 kg)   04/27/18 (!) 343 lb (155.6 kg)   09/12/17 (!) 331 lb 9.6 oz (150.4 kg)              We Performed the Following     SPORTS MEDICINE REFERRAL        Primary Care Provider Office Phone # Fax #    Yrn Mohinder Lopez [phone]       [address] [id number]        Equal Access to Services     BROOKS ROBINS AH: Hadii aad ku hadasho Soedmundali, waaxda luqadaha, qaybta kaalmada adeegyada, atif yarbrough. So [facility] [phone].    ATENCIÓN: Si habla español, tiene a ceja disposición servicios gratuitos de asistencia lingüística. Llame al [phone].    We comply with applicable federal civil rights laws and [place] laws. We do not discriminate on the basis of race, color, national origin, age, disability, sex, sexual orientation, or gender identity.            Thank you!     Thank you for choosing [facility]  for your care. Our goal is always to provide you with excellent care. Hearing back from our patients is one way we can continue to improve our services. Please take a few minutes to complete the written survey that you may receive in the mail after your visit with us. Thank you!              Your Updated Medication List - Protect others around you: Learn how to safely use, store and throw away your medicines at www.disposemymeds.org.          This list is accurate as of 8/1/18  4:49 PM.  Always use your most recent med list.                   Brand Name Dispense Instructions for use Diagnosis    acetaminophen-codeine 300-30 MG per tablet    TYLENOL #3    6 tablet    Take 1 tablet by mouth every 8 hours as needed for severe pain maximum 6 tablet(s) per day    Dental infection       levonorgestrel 20 MCG/24HR IUD    MIRENA     1 each by Intrauterine route once        VITAMIN D PO      Take 4,000 Units by mouth daily

## 2018-08-01 NOTE — NURSING NOTE
"Chief Complaint   Patient presents with     Musculoskeletal Problem     hip pain - right        Initial /84  Pulse 70  Temp 98.3  F (36.8  C) (Tympanic)  Resp 16  Wt (!) 333 lb 12.8 oz (151.4 kg)  SpO2 98%  BMI 50.75 kg/m2 Estimated body mass index is 50.75 kg/(m^2) as calculated from the following:    Height as of 4/27/18: 5' 8\" (1.727 m).    Weight as of this encounter: 333 lb 12.8 oz (151.4 kg).      Health Maintenance that is potentially due pending provider review:  Tdap    Possibly completing today per provider review.    Is there anyone who you would like to be able to receive your results? No  If yes have patient fill out LOIS      "

## 2018-08-30 ENCOUNTER — HOSPITAL ENCOUNTER (OUTPATIENT)
Dept: MAMMOGRAPHY | Facility: CLINIC | Age: 43
Discharge: HOME | End: 2018-08-30
Attending: FAMILY MEDICINE | Admitting: FAMILY MEDICINE
Payer: COMMERCIAL

## 2018-08-30 DIAGNOSIS — Z12.31 VISIT FOR SCREENING MAMMOGRAM: ICD-10-CM

## 2018-08-30 PROCEDURE — 77067 SCR MAMMO BI INCL CAD: CPT

## 2018-09-13 ENCOUNTER — OFFICE VISIT (OUTPATIENT)
Dept: OBGYN | Facility: CLINIC | Age: 43
End: 2018-09-13
Payer: COMMERCIAL

## 2018-09-13 VITALS
TEMPERATURE: 98.9 F | SYSTOLIC BLOOD PRESSURE: 104 MMHG | BODY MASS INDEX: 50.48 KG/M2 | WEIGHT: 293 LBS | DIASTOLIC BLOOD PRESSURE: 70 MMHG

## 2018-09-13 DIAGNOSIS — Z01.419 WELL FEMALE EXAM WITH ROUTINE GYNECOLOGICAL EXAM: Primary | ICD-10-CM

## 2018-09-13 PROCEDURE — 87624 HPV HI-RISK TYP POOLED RSLT: CPT | Performed by: OBSTETRICS & GYNECOLOGY

## 2018-09-13 PROCEDURE — 90471 IMMUNIZATION ADMIN: CPT | Performed by: OBSTETRICS & GYNECOLOGY

## 2018-09-13 PROCEDURE — 99396 PREV VISIT EST AGE 40-64: CPT | Mod: 25 | Performed by: OBSTETRICS & GYNECOLOGY

## 2018-09-13 PROCEDURE — 88175 CYTOPATH C/V AUTO FLUID REDO: CPT | Performed by: OBSTETRICS & GYNECOLOGY

## 2018-09-13 PROCEDURE — 90715 TDAP VACCINE 7 YRS/> IM: CPT | Performed by: OBSTETRICS & GYNECOLOGY

## 2018-09-13 NOTE — LETTER
[facility]  [address] [address]  [phone]        December 19, 2018    Anne Cortez  [address]  [place] [address]              Dear Anne Cortez    This is to remind you that your Basic profile and Fasting Lipid profile is due.    You may call our office at [phone] to schedule an appointment.    Please disregard this notice if you have already had your labs drawn or made an appointment.        Sincerely,        Gina Hooks MD

## 2018-09-13 NOTE — MR AVS SNAPSHOT
After Visit Summary   9/13/2018    Anne Cortez    MRN: [id number]           Patient Information     Date Of Birth          1975        Visit Information        Provider Department      9/13/2018 2:00 PM Gina Hooks MD [facility]        Today's Diagnoses     Well female exam with routine gynecological exam    -  1      Care Instructions      Preventive Health Recommendations  Female Ages 40 to 49    Yearly exam:     See your health care provider every year in order to  1. Review health changes.   2. Discuss preventive care.    3. Review your medicines if your doctor prescribed any.      Get a Pap test every three years (unless you have an abnormal result and your provider advises testing more often).      If you get Pap tests with HPV test, you only need to test every 5 years, unless you have an abnormal result. You do not need a Pap test if your uterus was removed (hysterectomy) and you have not had cancer.      You should be tested each year for STDs (sexually transmitted diseases), if you're at risk.     Ask your doctor if you should have a mammogram.      Have a colonoscopy (test for colon cancer) if someone in your family has had colon cancer or polyps before age 50.       Have a cholesterol test every 5 years.       Have a diabetes test (fasting glucose) after age 45. If you are at risk for diabetes, you should have this test every 3 years.    Shots: Get a flu shot each year. Get a tetanus shot every 10 years.     Nutrition:     Eat at least 5 servings of fruits and vegetables each day.    Eat whole-grain bread, whole-wheat pasta and brown rice instead of white grains and rice.    Get adequate Calcium and Vitamin D.      Lifestyle    Exercise at least 150 minutes a week (an average of 30 minutes a day, 5 days a week). This will help you control your weight and prevent disease.    Limit alcohol to one drink per day.    No smoking.     Wear sunscreen to  prevent skin cancer.    See your dentist every six months for an exam and cleaning.          Follow-ups after your visit        Future tests that were ordered for you today     Open Future Orders        Priority Expected Expires Ordered    Lipid panel reflex to direct LDL Fasting Routine  12/13/2018 9/13/2018    Glucose, whole blood Routine  12/13/2018 9/13/2018            Who to contact     If you have questions or need follow up information about today's clinic visit or your schedule please contact [facility] directly at [phone].  Normal or non-critical lab and imaging results will be communicated to you by [employer], letter or phone within 4 business days after the clinic has received the results. If you do not hear from us within 7 days, please contact the clinic through [employer] or phone. If you have a critical or abnormal lab result, we will notify you by phone as soon as possible.  Submit refill requests through [employer] or call your pharmacy and they will forward the refill request to us. Please allow 3 business days for your refill to be completed.          Additional Information About Your Visit        [employer] Information     [employer] gives you secure access to your electronic health record. If you see a primary care provider, you can also send messages to your care team and make appointments. If you have questions, please call your primary care clinic.  If you do not have a primary care provider, please call [phone] and they will assist you.        Care EveryWhere ID     This is your Care EveryWhere ID. This could be used by other organizations to access your [facility] medical records  [id number]        Your Vitals Were     Temperature Breastfeeding? BMI (Body Mass Index)             98.9  F (37.2  C) (Tympanic) No 50.48 kg/m2          Blood Pressure from Last 3 Encounters:   09/13/18 104/70   08/01/18 124/84   04/27/18 122/84    Weight from Last 3 Encounters:   09/13/18 (!) 332 lb  (150.6 kg)   08/01/18 (!) 333 lb 12.8 oz (151.4 kg)   04/27/18 (!) 343 lb (155.6 kg)              We Performed the Following     Pap imaged thin layer screen with HPV - recommended age 30 - 65 years (select HPV order below)     TDAP VACCINE (ADACEL)     VACCINE ADMINISTRATION, INITIAL        Primary Care Provider Office Phone # Fax #    Yrn Vines MD Jessica [phone]       [address] [facility] [address]        Equal Access to Services     BROOKS ROBINS : Hadii aad ku hadasho Soomaali, waaxda luqadaha, qaybta kaalmada adeegyada, waxvioleta bueno hayedin aldana . So [facility] [phone].    ATENCIÓN: Si habla español, tiene a ceja disposición servicios gratuitos de asistencia lingüística. Llame al [phone].    We comply with applicable federal civil rights laws and [place] laws. We do not discriminate on the basis of race, color, national origin, age, disability, sex, sexual orientation, or gender identity.            Thank you!     Thank you for choosing [facility]  for your care. Our goal is always to provide you with excellent care. Hearing back from our patients is one way we can continue to improve our services. Please take a few minutes to complete the written survey that you may receive in the mail after your visit with us. Thank you!             Your Updated Medication List - Protect others around you: Learn how to safely use, store and throw away your medicines at www.disposemymeds.org.          This list is accurate as of 9/13/18  2:36 PM.  Always use your most recent med list.                   Brand Name Dispense Instructions for use Diagnosis    AMOXICILLIN PO      Take 875 mg by mouth 2 times daily        levonorgestrel 20 MCG/24HR IUD    MIRENA     1 each by Intrauterine route once        VITAMIN D PO      Take 4,000 Units by mouth daily
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
## 2018-09-13 NOTE — NURSING NOTE
Prior to injection verified patient identity using patient's name and date of birth.  Due to injection administration, patient instructed to remain in clinic for 15 minutes  afterwards, and to report any adverse reaction to me immediately.    aisha Browne

## 2018-09-17 LAB
COPATH REPORT: NORMAL
PAP: NORMAL

## 2018-09-19 LAB
FINAL DIAGNOSIS: NORMAL
HPV HR 12 DNA CVX QL NAA+PROBE: NEGATIVE
HPV16 DNA SPEC QL NAA+PROBE: NEGATIVE
HPV18 DNA SPEC QL NAA+PROBE: NEGATIVE
SPECIMEN DESCRIPTION: NORMAL
SPECIMEN SOURCE CVX/VAG CYTO: NORMAL

## 2018-10-15 ENCOUNTER — OFFICE VISIT (OUTPATIENT)
Dept: FAMILY MEDICINE | Facility: CLINIC | Age: 43
End: 2018-10-15
Payer: COMMERCIAL

## 2018-10-15 VITALS
WEIGHT: 293 LBS | BODY MASS INDEX: 44.41 KG/M2 | RESPIRATION RATE: 18 BRPM | HEART RATE: 88 BPM | HEIGHT: 68 IN | SYSTOLIC BLOOD PRESSURE: 118 MMHG | DIASTOLIC BLOOD PRESSURE: 78 MMHG | TEMPERATURE: 97.8 F

## 2018-10-15 DIAGNOSIS — S22.42XS CLOSED FRACTURE OF MULTIPLE RIBS OF LEFT SIDE, SEQUELA: ICD-10-CM

## 2018-10-15 DIAGNOSIS — Z86.718 HISTORY OF DEEP VENOUS THROMBOSIS: ICD-10-CM
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
## 2018-10-15 DIAGNOSIS — N28.0 RENAL INFARCT (H): ICD-10-CM

## 2018-10-15 DIAGNOSIS — S25.01XS: ICD-10-CM

## 2018-10-15 DIAGNOSIS — D68.51 FACTOR V LEIDEN CARRIER (H): ICD-10-CM

## 2018-10-15 DIAGNOSIS — D69.6 THROMBOCYTOPENIA (H): ICD-10-CM

## 2018-10-15 LAB
ERYTHROCYTE [DISTWIDTH] IN BLOOD BY AUTOMATED COUNT: 15 % (ref 10–15)
HCT VFR BLD AUTO: 35.3 % (ref 35–47)
HGB BLD-MCNC: 11 G/DL (ref 11.7–15.7)
MCH RBC QN AUTO: 28.9 PG (ref 26.5–33)
MCHC RBC AUTO-ENTMCNC: 31.2 G/DL (ref 31.5–36.5)
MCV RBC AUTO: 93 FL (ref 78–100)
PLATELET # BLD AUTO: 301 10E9/L (ref 150–450)
RBC # BLD AUTO: 3.81 10E12/L (ref 3.8–5.2)
WBC # BLD AUTO: 9.9 10E9/L (ref 4–11)

## 2018-10-15 PROCEDURE — 85027 COMPLETE CBC AUTOMATED: CPT | Performed by: FAMILY MEDICINE

## 2018-10-15 PROCEDURE — 99214 OFFICE O/P EST MOD 30 MIN: CPT | Performed by: FAMILY MEDICINE

## 2018-10-15 PROCEDURE — 36415 COLL VENOUS BLD VENIPUNCTURE: CPT | Performed by: FAMILY MEDICINE

## 2018-10-15 RX ORDER — ACETAMINOPHEN 325 MG/1
325-650 TABLET ORAL
COMMUNITY
Start: 2018-10-07 | End: 2019-09-30

## 2018-10-15 RX ORDER — OXYCODONE HYDROCHLORIDE 5 MG/1
5-10 TABLET ORAL
COMMUNITY
Start: 2018-10-07 | End: 2018-11-12

## 2018-10-15 RX ORDER — METHOCARBAMOL 500 MG/1
500-1000 TABLET, FILM COATED ORAL
COMMUNITY
Start: 2018-10-07 | End: 2018-11-12

## 2018-10-15 NOTE — NURSING NOTE
"Chief Complaint   Patient presents with     Hospital F/U       Initial /78 (Cuff Size: Adult Large)  Pulse 88  Temp 97.8  F (36.6  C) (Tympanic)  Resp 18  Ht 5' 8\" (1.727 m)  Wt (!) 332 lb (150.6 kg)  Breastfeeding? No  BMI 50.48 kg/m2 Estimated body mass index is 50.48 kg/(m^2) as calculated from the following:    Height as of this encounter: 5' 8\" (1.727 m).    Weight as of this encounter: 332 lb (150.6 kg).    Patient presents to the clinic using No DME    Health Maintenance that is potentially due pending provider review:  NONE    n/a    Is there anyone who you would like to be able to receive your results? Not Applicable  If yes have patient fill out LOIS    "
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
## 2018-10-17 NOTE — TELEPHONE ENCOUNTER
Date of appointment: 10/26/18   Diagnosis/reason for appointment:Factor V Leiden Carrier  Referring provider/facility:Dr Lopez  Who called:    Recent Studies  Imaging:[facility]  Pathology:[facility]  Labs:Epic  Previous chemo/radiation (if known):    Records requested from:NA  Records received from:NA    Additional information:

## 2018-10-22 ENCOUNTER — MYC MEDICAL ADVICE (OUTPATIENT)
Dept: FAMILY MEDICINE | Facility: CLINIC | Age: 43
End: 2018-10-22

## 2018-10-26 ENCOUNTER — PRE VISIT (OUTPATIENT)
Dept: ONCOLOGY | Facility: CLINIC | Age: 43
End: 2018-10-26

## 2018-10-29 ENCOUNTER — ONCOLOGY VISIT (OUTPATIENT)
Dept: ONCOLOGY | Facility: CLINIC | Age: 43
End: 2018-10-29
Attending: INTERNAL MEDICINE
Payer: COMMERCIAL

## 2018-10-29 VITALS
SYSTOLIC BLOOD PRESSURE: 125 MMHG | OXYGEN SATURATION: 98 % | HEIGHT: 68 IN | DIASTOLIC BLOOD PRESSURE: 71 MMHG | TEMPERATURE: 97.7 F | BODY MASS INDEX: 44.41 KG/M2 | WEIGHT: 293 LBS | HEART RATE: 82 BPM | RESPIRATION RATE: 20 BRPM

## 2018-10-29 DIAGNOSIS — I82.5Y9 CHRONIC DEEP VEIN THROMBOSIS (DVT) OF PROXIMAL VEIN OF LOWER EXTREMITY, UNSPECIFIED LATERALITY (H): Primary | ICD-10-CM

## 2018-10-29 DIAGNOSIS — D68.52 PROTHROMBIN GENE MUTATION (H): ICD-10-CM

## 2018-10-29 DIAGNOSIS — D68.51 FACTOR V LEIDEN CARRIER (H): ICD-10-CM

## 2018-10-29 PROCEDURE — 99204 OFFICE O/P NEW MOD 45 MIN: CPT | Performed by: INTERNAL MEDICINE

## 2018-10-29 PROCEDURE — G0463 HOSPITAL OUTPT CLINIC VISIT: HCPCS

## 2018-10-29 ASSESSMENT — PAIN SCALES - GENERAL: PAINLEVEL: MODERATE PAIN (4)

## 2018-10-29 NOTE — PATIENT INSTRUCTIONS
We would like to see you back in clinic with Dr. Castro in 3 months with labs and ultrasound prior.      Your prescription (Xarelto)has been sent to:   [facility] [place] [facility] [place] - [address]  [address] [facility] [address]  Phone: [phone] Fax: [phone] Alternate Fax: [phone], [phone]  When you are in need of a refill, please call your pharmacy and they will send us a request.      Copy of appointments, and after visit summary (AVS) given to patient.      If you have any questions during business hours (M-F 8 AM- 4PM), please call Indu Snigh RN, BSN, OCN Oncology Hematology /Breast Cancer Navigator at [facility] [phone].       For questions after business hours, or on holidays/weekends, please call our after hours Nurse Triage line [phone]. Thank you.

## 2018-10-29 NOTE — NURSING NOTE
"Oncology Rooming Note    October 29, 2018 10:06 AM   Anne Cortez is a 43 year old female who presents for:    Chief Complaint   Patient presents with     Hematology     NEW, Factor V Leiden carrier.      Initial Vitals: /71 (BP Location: Right arm, Patient Position: Sitting, Cuff Size: Adult Large)  Pulse 82  Temp 97.7  F (36.5  C) (Tympanic)  Resp 20  Ht 1.727 m (5' 8\")  Wt 146.5 kg (322 lb 14.4 oz)  SpO2 98%  Breastfeeding? No  BMI 49.1 kg/m2 Estimated body mass index is 49.1 kg/(m^2) as calculated from the following:    Height as of this encounter: 1.727 m (5' 8\").    Weight as of this encounter: 146.5 kg (322 lb 14.4 oz). Body surface area is 2.65 meters squared.  Moderate Pain (4) Comment: maily in her ribs.    No LMP recorded. Patient is not currently having periods (Reason: IUD).  Allergies reviewed: Yes  Medications reviewed: Yes    Medications: Medication refills not needed today.  Pharmacy name entered into [employer]: [facility] [place], [place] - [address]    Clinical concerns: NEW, Factor V Leiden carrier.     8 minutes for nursing intake (face to face time)     Madelyn Antonio [facility]            "

## 2018-10-29 NOTE — MR AVS SNAPSHOT
After Visit Summary   10/29/2018    Anne Cortez    MRN: [id number]           Patient Information     Date Of Birth          1975        Visit Information        Provider Department      10/29/2018 10:00 AM Zafar Castro MD [facility] ONCOLOGY      Today's Diagnoses     Chronic deep vein thrombosis (DVT) of proximal vein of lower extremity, unspecified laterality (H)    -  1       Follow-ups after your visit        Follow-up notes from your care team     Return in about 3 months (around 1/29/2019) for Blood work before next appointment, Imaging ordered before next appointment.      Your next 10 appointments already scheduled     Nov 12, 2018  9:00 AM CST   Tamara Shah with Yrn Lopez MD   [facility]    [address]  [facility] [address]   [phone]            Jan 25, 2019  9:15 AM CST   US LOWER EXTREMITY VENOUS DUPLEX LEFT with WYUS1   [facility] ([facility])    [address] [address]   [phone]           How do I prepare for my exam? (Food and drink instructions) No Food and Drink Restrictions.  How do I prepare for my exam? (Other instructions) You do not need to do anything special to prepare for your exam.  What should I wear: Wear comfortable clothes.  How long does the exam take: Most ultrasounds take 30 to 60 minutes.  What should I bring: Bring a list of your medicines, including vitamins, minerals and over-the-counter drugs. It is safest to leave personal items at home.  Do I need a :  No  is needed.  What do I need to tell my doctor: Tell your doctor about any allergies you may have.  What should I do after the exam: No restrictions, You may resume normal activities.  What is this test: An ultrasound uses sound waves to make pictures of the body. Sound waves do not cause pain. The only discomfort may be the pressure of the wand  against your skin or full bladder.  Who should I call with questions: If you have any questions, please call the Imaging Department where you will have your exam. Directions, parking instructions, and other information is available on our website, [email or web address].            Jan 25, 2019  9:50 AM CST   LAB with [facility] ([facility])    [address]  [facility] [address]   [phone]           Please do not eat 10-12 hours before your appointment if you are coming in fasting for labs on lipids, cholesterol, or glucose (sugar). This does not apply to pregnant women. Water, hot tea and black coffee (with nothing added) are okay. Do not drink other fluids, diet soda or chew gum.            Jan 30, 2019 11:20 AM CST   Return Visit with Zafar Castro MD   [facility] ([facility])    [facility]  [address]  [facility] [address]   [phone]              Future tests that were ordered for you today     Open Future Orders        Priority Expected Expires Ordered    US Lower Extremity Venous Duplex Left Routine 1/29/2019 10/29/2019 10/29/2018    D dimer quantitative Routine 1/29/2019 10/29/2019 10/29/2018            Who to contact     If you have questions or need follow up information about today's clinic visit or your schedule please contact [facility] directly at [phone].  Normal or non-critical lab and imaging results will be communicated to you by MyChart, letter or phone within 4 business days after the clinic has received the results. If you do not hear from us within 7 days, please contact the clinic through MyChart or phone. If you have a critical or abnormal lab result, we will notify you by phone as soon as possible.  Submit refill requests through [employer] or call your pharmacy and they will forward the refill request to us. Please allow 3 business days for your refill to  "be completed.          Additional Information About Your Visit        MyChart Information     [employer] gives you secure access to your electronic health record. If you see a primary care provider, you can also send messages to your care team and make appointments. If you have questions, please call your primary care clinic.  If you do not have a primary care provider, please call [phone] and they will assist you.        Care EveryWhere ID     This is your Care EveryWhere ID. This could be used by other organizations to access your Mobile medical records  [id number]        Your Vitals Were     Pulse Temperature Respirations Height Pulse Oximetry Breastfeeding?    82 97.7  F (36.5  C) (Tympanic) 20 1.727 m (5' 8\") 98% No    BMI (Body Mass Index)                   49.1 kg/m2            Blood Pressure from Last 3 Encounters:   10/29/18 125/71   10/15/18 118/78   09/13/18 104/70    Weight from Last 3 Encounters:   10/29/18 146.5 kg (322 lb 14.4 oz)   10/15/18 (!) 150.6 kg (332 lb)   09/13/18 (!) 150.6 kg (332 lb)                 Where to get your medicines      [facility] [address] [facility]  [address] [facility] [address]     Phone:  [phone]     rivaroxaban ANTICOAGULANT 20 MG Tabs tablet          Primary Care Provider Office Phone # Fax #    Yrn Ur MD Jessica [phone]       [address] [facility] [address]        Equal Access to Services     HARLEY ROBINS AH: Hadii aad ku hadasho Soomaali, waaxda luqadaha, qaybta kaalmada adeegyada, waxay myles yarbrough. So [facility] [phone].    ATENCIÓN: Si habla español, tiene a ceja disposición servicios gratuitos de asistencia lingüística. Llame al [phone].    We comply with applicable federal civil rights laws and [place] laws. We do not discriminate on the basis of race, color, national origin, age, disability, sex, sexual orientation, or gender " identity.            Thank you!     Thank you for choosing [facility]  for your care. Our goal is always to provide you with excellent care. Hearing back from our patients is one way we can continue to improve our services. Please take a few minutes to complete the written survey that you may receive in the mail after your visit with us. Thank you!             Your Updated Medication List - Protect others around you: Learn how to safely use, store and throw away your medicines at www.disposemymeds.org.          This list is accurate as of 10/29/18 10:42 AM.  Always use your most recent med list.                   Brand Name Dispense Instructions for use Diagnosis    acetaminophen 325 MG tablet    TYLENOL     Take 325-650 mg by mouth        levonorgestrel 20 MCG/24HR IUD    MIRENA     1 each by Intrauterine route once        methocarbamol 500 MG tablet    ROBAXIN     Take 500-1,000 mg by mouth        oxyCODONE IR 5 MG tablet    ROXICODONE     Take 5-10 mg by mouth        rivaroxaban ANTICOAGULANT 20 MG Tabs tablet    XARELTO    30 tablet    Take 1 tablet (20 mg) by mouth daily (with dinner)    Chronic deep vein thrombosis (DVT) of proximal vein of lower extremity, unspecified laterality (H)       VITAMIN D PO      Take 4,000 Units by mouth daily
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
## 2018-10-31 PROBLEM — D68.52 PROTHROMBIN GENE MUTATION (H): Status: ACTIVE | Noted: 2018-10-31

## 2018-10-31 PROBLEM — D68.51 FACTOR V LEIDEN CARRIER (H): Status: ACTIVE | Noted: 2018-10-31

## 2018-10-31 PROBLEM — I82.5Y9 CHRONIC DEEP VEIN THROMBOSIS (DVT) OF PROXIMAL VEIN OF LOWER EXTREMITY, UNSPECIFIED LATERALITY (H): Status: ACTIVE | Noted: 2018-10-31
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
## 2018-11-04 ENCOUNTER — MYC MEDICAL ADVICE (OUTPATIENT)
Dept: ONCOLOGY | Facility: CLINIC | Age: 43
End: 2018-11-04

## 2018-11-07 ENCOUNTER — MYC MEDICAL ADVICE (OUTPATIENT)
Dept: FAMILY MEDICINE | Facility: CLINIC | Age: 43
End: 2018-11-07

## 2018-11-09 ENCOUNTER — TELEPHONE (OUTPATIENT)
Dept: FAMILY MEDICINE | Facility: CLINIC | Age: 43
End: 2018-11-09

## 2018-11-09 NOTE — TELEPHONE ENCOUNTER
Reason for Call:  Form, our goal is to have forms completed with 72 hours, however, some forms may require a visit or additional information.    Type of letter, form or note:  medical    Who is the form from?: [employer]    Where did the form come from: form was faxed in    What clinic location was the form placed at?: [place]    Where the form was placed: 's Box       Additional comments: visit notes and lab results printed off    Call taken on 11/9/2018 at 8:16 AM by Rut Zepeda

## 2018-11-12 ENCOUNTER — OFFICE VISIT (OUTPATIENT)
Dept: FAMILY MEDICINE | Facility: CLINIC | Age: 43
End: 2018-11-12
Payer: COMMERCIAL

## 2018-11-12 VITALS
TEMPERATURE: 97.8 F | RESPIRATION RATE: 18 BRPM | HEART RATE: 68 BPM | DIASTOLIC BLOOD PRESSURE: 82 MMHG | SYSTOLIC BLOOD PRESSURE: 130 MMHG | WEIGHT: 293 LBS | BODY MASS INDEX: 44.41 KG/M2 | HEIGHT: 68 IN

## 2018-11-12 DIAGNOSIS — N28.0 RENAL INFARCT (H): ICD-10-CM

## 2018-11-12 DIAGNOSIS — D68.52 PROTHROMBIN GENE MUTATION (H): ICD-10-CM

## 2018-11-12 DIAGNOSIS — S25.01XS: ICD-10-CM

## 2018-11-12 DIAGNOSIS — D68.51 FACTOR V LEIDEN MUTATION (H): ICD-10-CM

## 2018-11-12 DIAGNOSIS — Z87.81 HISTORY OF FRACTURE OF RIB: ICD-10-CM
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
## 2018-11-12 DIAGNOSIS — L50.9 HIVES: ICD-10-CM

## 2018-11-12 DIAGNOSIS — M79.605 PAIN OF LEFT LOWER EXTREMITY: ICD-10-CM

## 2018-11-12 DIAGNOSIS — Z86.718 HISTORY OF DEEP VENOUS THROMBOSIS: ICD-10-CM

## 2018-11-12 PROCEDURE — 99214 OFFICE O/P EST MOD 30 MIN: CPT | Performed by: FAMILY MEDICINE

## 2018-11-12 RX ORDER — PREDNISONE 20 MG/1
40 TABLET ORAL DAILY
Qty: 10 TABLET | Refills: 0 | Status: SHIPPED | OUTPATIENT
Start: 2018-11-12 | End: 2019-02-06
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
## 2018-11-15 ENCOUNTER — OFFICE VISIT (OUTPATIENT)
Dept: FAMILY MEDICINE | Facility: CLINIC | Age: 43
End: 2018-11-15
Payer: COMMERCIAL

## 2018-11-15 VITALS
TEMPERATURE: 97.6 F | HEART RATE: 68 BPM | WEIGHT: 293 LBS | HEIGHT: 68 IN | BODY MASS INDEX: 44.41 KG/M2 | DIASTOLIC BLOOD PRESSURE: 80 MMHG | RESPIRATION RATE: 18 BRPM | SYSTOLIC BLOOD PRESSURE: 104 MMHG

## 2018-11-15 DIAGNOSIS — H66.011 ACUTE SUPPURATIVE OTITIS MEDIA OF RIGHT EAR WITH SPONTANEOUS RUPTURE OF TYMPANIC MEMBRANE, RECURRENCE NOT SPECIFIED: Primary | ICD-10-CM

## 2018-11-15 PROCEDURE — 99213 OFFICE O/P EST LOW 20 MIN: CPT | Performed by: FAMILY MEDICINE

## 2018-11-15 RX ORDER — NEOMYCIN SULFATE, POLYMYXIN B SULFATE AND HYDROCORTISONE 10; 3.5; 1 MG/ML; MG/ML; [USP'U]/ML
4 SUSPENSION/ DROPS AURICULAR (OTIC) 4 TIMES DAILY
Qty: 6 ML | Refills: 0 | Status: SHIPPED | OUTPATIENT
Start: 2018-11-15 | End: 2019-02-06

## 2018-11-15 NOTE — PATIENT INSTRUCTIONS
Middle Ear Infection (Adult)  You have an infection of the middle ear, the space behind the eardrum. This is also called acute otitis media (AOM). Sometimes it is caused by the common cold. This is because congestion can block the internal passage (eustachian tube) that drains fluid from the middle ear. When the middle ear fills with fluid, bacteria can grow there and cause an infection. Oral antibiotics are used to treat this illness, not ear drops. Symptoms usually start to improve within 1 to 2 days of treatment.    Home care  The following are general care guidelines:    Finish all of the antibiotic medicine given, even though you may feel better after the first few days.    You may use over-the-counter medicine, such as acetaminophen or ibuprofen, to control pain and fever, unless something else was prescribed. If you have chronic liver or kidney disease or have ever had a stomach ulcer or gastrointestinal bleeding, talk with your healthcare provider before using these medicines. Do not give aspirin to anyone under 18 years of age who has a fever. It may cause severe illness or death.  Follow-up care  Follow up with your healthcare provider, or as advised, in 2 weeks if all symptoms have not gotten better, or if hearing doesn't go back to normal within 1 month.  When to seek medical advice  Call your healthcare provider right away if any of these occur:    Ear pain gets worse or does not improve after 3 days of treatment    Unusual drowsiness or confusion    Neck pain, stiff neck, or headache    Fluid or blood draining from the ear canal    Fever of 100.4 F (38 C) or as advised     Seizure  Date Last Reviewed: 6/1/2016 2000-2018 [employer]. [address] [place] [address], [place] [address]. All rights reserved. This information is not intended as a substitute for professional medical care. Always follow your healthcare professional's instructions.
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
## 2018-11-15 NOTE — MR AVS SNAPSHOT
After Visit Summary   11/15/2018    Anne Cortez    MRN: [id number]           Patient Information     Date Of Birth          1975        Visit Information        Provider Department      11/15/2018 8:40 AM Yrn Lopez MD [facility]        Today's Diagnoses     Acute suppurative otitis media of right ear with spontaneous rupture of tympanic membrane, recurrence not specified    -  1      Care Instructions      Middle Ear Infection (Adult)  You have an infection of the middle ear, the space behind the eardrum. This is also called acute otitis media (AOM). Sometimes it is caused by the common cold. This is because congestion can block the internal passage (eustachian tube) that drains fluid from the middle ear. When the middle ear fills with fluid, bacteria can grow there and cause an infection. Oral antibiotics are used to treat this illness, not ear drops. Symptoms usually start to improve within 1 to 2 days of treatment.    Home care  The following are general care guidelines:    Finish all of the antibiotic medicine given, even though you may feel better after the first few days.    You may use over-the-counter medicine, such as acetaminophen or ibuprofen, to control pain and fever, unless something else was prescribed. If you have chronic liver or kidney disease or have ever had a stomach ulcer or gastrointestinal bleeding, talk with your healthcare provider before using these medicines. Do not give aspirin to anyone under 18 years of age who has a fever. It may cause severe illness or death.  Follow-up care  Follow up with your healthcare provider, or as advised, in 2 weeks if all symptoms have not gotten better, or if hearing doesn't go back to normal within 1 month.  When to seek medical advice  Call your healthcare provider right away if any of these occur:    Ear pain gets worse or does not improve after 3 days of treatment    Unusual drowsiness or  confusion    Neck pain, stiff neck, or headache    Fluid or blood draining from the ear canal    Fever of 100.4 F (38 C) or as advised     Seizure  Date Last Reviewed: 6/1/2016 2000-2018 [employer]. [address] [place] [address], [place] [address]. All rights reserved. This information is not intended as a substitute for professional medical care. Always follow your healthcare professional's instructions.                Follow-ups after your visit        Your next 10 appointments already scheduled     Nov 16, 2018  8:00 AM CST   Ortho Eval with Ysabel Joshi, PT   [facility] ([facility])    [address] [place] [address]   [phone]            Jan 25, 2019  9:15 AM CST   US LOWER EXTREMITY VENOUS DUPLEX LEFT with WYUS1   [facility] ([facility])    [address] [facility] [address]   [phone]           How do I prepare for my exam? (Food and drink instructions) No Food and Drink Restrictions.  How do I prepare for my exam? (Other instructions) You do not need to do anything special to prepare for your exam.  What should I wear: Wear comfortable clothes.  How long does the exam take: Most ultrasounds take 30 to 60 minutes.  What should I bring: Bring a list of your medicines, including vitamins, minerals and over-the-counter drugs. It is safest to leave personal items at home.  Do I need a :  No  is needed.  What do I need to tell my doctor: Tell your doctor about any allergies you may have.  What should I do after the exam: No restrictions, You may resume normal activities.  What is this test: An ultrasound uses sound waves to make pictures of the body. Sound waves do not cause pain. The only discomfort may be the pressure of the wand against your skin or full bladder.  Who should I call with questions: If you have any questions, please call the Imaging Department where you will have your  exam. Directions, parking instructions, and other information is available on our website, [email or web address].            Jan 25, 2019  9:50 AM CST   LAB with [facility] ([facility])    [address]  [facility] [address]   [phone]           Please do not eat 10-12 hours before your appointment if you are coming in fasting for labs on lipids, cholesterol, or glucose (sugar). This does not apply to pregnant women. Water, hot tea and black coffee (with nothing added) are okay. Do not drink other fluids, diet soda or chew gum.            Jan 30, 2019 11:20 AM CST   Return Visit with Zafar Castro MD   [facility] ([facility])    [facility]  [address]  [facility] [address]   [phone]              Who to contact     If you have questions or need follow up information about today's clinic visit or your schedule please contact [facility] directly at [phone].  Normal or non-critical lab and imaging results will be communicated to you by [employer], letter or phone within 4 business days after the clinic has received the results. If you do not hear from us within 7 days, please contact the clinic through [employer] or phone. If you have a critical or abnormal lab result, we will notify you by phone as soon as possible.  Submit refill requests through [employer] or call your pharmacy and they will forward the refill request to us. Please allow 3 business days for your refill to be completed.          Additional Information About Your Visit        [employer] Information     [employer] gives you secure access to your electronic health record. If you see a primary care provider, you can also send messages to your care team and make appointments. If you have questions, please call your primary care clinic.  If you do not have a primary care provider, please call [phone] and they will assist  "you.        Care EveryWhere ID     This is your Care EveryWhere ID. This could be used by other organizations to access your [place] medical records  [id number]        Your Vitals Were     Pulse Temperature Respirations Height Breastfeeding? BMI (Body Mass Index)    68 97.6  F (36.4  C) (Tympanic) 18 5' 8\" (1.727 m) No 48.96 kg/m2       Blood Pressure from Last 3 Encounters:   11/15/18 104/80   11/12/18 130/82   10/29/18 125/71    Weight from Last 3 Encounters:   11/15/18 322 lb (146.1 kg)   11/12/18 322 lb (146.1 kg)   10/29/18 322 lb 14.4 oz (146.5 kg)              Today, you had the following     No orders found for display         Today's Medication Changes          These changes are accurate as of 11/15/18  8:56 AM.  If you have any questions, ask your nurse or doctor.               Start taking these medicines.        Dose/Directions    amoxicillin-clavulanate 875-125 MG per tablet   Commonly known as:  AUGMENTIN   Used for:  Acute suppurative otitis media of right ear with spontaneous rupture of tympanic membrane, recurrence not specified   Started by:  Yrn Lopez MD        Dose:  1 tablet   Take 1 tablet by mouth 2 times daily for 10 days   Quantity:  20 tablet   Refills:  0       neomycin-polymyxin-hydrocortisone 3.5-12189-2 otic suspension   Commonly known as:  CORTISPORIN   Used for:  Acute suppurative otitis media of right ear with spontaneous rupture of tympanic membrane, recurrence not specified   Started by:  Yrn Lopez MD        Dose:  4 drop   Place 4 drops into the right ear 4 times daily for 7 days   Quantity:  6 mL   Refills:  0            Where to get your medicines      These medications [id number] [place], [place] [facility] [address] [facility] [address] [address]     Phone:  [phone]     amoxicillin-clavulanate 875-125 MG per tablet    neomycin-polymyxin-hydrocortisone 3.5-18377-4 otic suspension                Primary Care Provider Office " Phone # Fax #    Yrn Vines MD Jessica [phone]       [address] [facility] [address]        Equal Access to Services     BROOKS ROBINS : Hadii aad ku hadadelaidashanita Arianali, jaretda karinfaisal, juiceta kabrice payne, atif raizain hayaan eliasmann rankin jovan yarbrough. So [facility] [phone].    ATENCIÓN: Si habla español, tiene a ceja disposición servicios gratuitos de asistencia lingüística. Llame al [phone].    We comply with applicable federal civil rights laws and [place] laws. We do not discriminate on the basis of race, color, national origin, age, disability, sex, sexual orientation, or gender identity.            Thank you!     Thank you for choosing [facility]  for your care. Our goal is always to provide you with excellent care. Hearing back from our patients is one way we can continue to improve our services. Please take a few minutes to complete the written survey that you may receive in the mail after your visit with us. Thank you!             Your Updated Medication List - Protect others around you: Learn how to safely use, store and throw away your medicines at www.disposemymeds.org.          This list is accurate as of 11/15/18  8:56 AM.  Always use your most recent med list.                   Brand Name Dispense Instructions for use Diagnosis    acetaminophen 325 MG tablet    TYLENOL     Take 325-650 mg by mouth        amoxicillin-clavulanate 875-125 MG per tablet    AUGMENTIN    20 tablet    Take 1 tablet by mouth 2 times daily for 10 days    Acute suppurative otitis media of right ear with spontaneous rupture of tympanic membrane, recurrence not specified       levonorgestrel 20 MCG/24HR IUD    MIRENA     1 each by Intrauterine route once        neomycin-polymyxin-hydrocortisone 3.5-69924-8 otic suspension    CORTISPORIN    6 mL    Place 4 drops into the right ear 4 times daily for 7 days    Acute suppurative otitis media of right ear with spontaneous rupture of tympanic  membrane, recurrence not specified       predniSONE 20 MG tablet    DELTASONE    10 tablet    Take 2 tablets (40 mg) by mouth daily for 5 days    Hives       rivaroxaban ANTICOAGULANT 20 MG Tabs tablet    XARELTO    30 tablet    Take 1 tablet (20 mg) by mouth daily (with dinner)    Chronic deep vein thrombosis (DVT) of proximal vein of lower extremity, unspecified laterality (H)       VITAMIN D PO      Take 4,000 Units by mouth daily

## 2018-11-15 NOTE — NURSING NOTE
"Chief Complaint   Patient presents with     Ear Problem       Initial /80 (Cuff Size: Adult Large)  Pulse 68  Temp 97.6  F (36.4  C) (Tympanic)  Resp 18  Ht 5' 8\" (1.727 m)  Wt 322 lb (146.1 kg)  Breastfeeding? No  BMI 48.96 kg/m2 Estimated body mass index is 48.96 kg/(m^2) as calculated from the following:    Height as of this encounter: 5' 8\" (1.727 m).    Weight as of this encounter: 322 lb (146.1 kg).    Patient presents to the clinic using No DME    Health Maintenance that is potentially due pending provider review:  NONE    n/a    Is there anyone who you would like to be able to receive your results? Not Applicable  If yes have patient fill out LOIS    "

## 2018-11-16 ENCOUNTER — HOSPITAL ENCOUNTER (OUTPATIENT)
Dept: PHYSICAL THERAPY | Facility: CLINIC | Age: 43
Setting detail: THERAPIES SERIES
End: 2018-11-16
Attending: FAMILY MEDICINE
Payer: COMMERCIAL

## 2018-11-16 DIAGNOSIS — M79.605 PAIN OF LEFT LOWER EXTREMITY: ICD-10-CM

## 2018-11-16 PROCEDURE — 97110 THERAPEUTIC EXERCISES: CPT | Mod: GP | Performed by: PHYSICAL THERAPIST

## 2018-11-16 PROCEDURE — 97162 PT EVAL MOD COMPLEX 30 MIN: CPT | Mod: GP | Performed by: PHYSICAL THERAPIST

## 2018-11-16 PROCEDURE — 40000718 ZZHC STATISTIC PT DEPARTMENT ORTHO VISIT: Performed by: PHYSICAL THERAPIST
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
## 2018-11-20 ENCOUNTER — MYC MEDICAL ADVICE (OUTPATIENT)
Dept: FAMILY MEDICINE | Facility: CLINIC | Age: 43
End: 2018-11-20

## 2018-11-20 NOTE — TELEPHONE ENCOUNTER
Discussed with pt and MD, letter emailed to:   Diana [employer]   [facility]  [employer] Resources  [address] [place] [id number]  [email or web address]  [email or web address]   Office: [phone]  Fax: [phone]     KYM Joshi RN

## 2018-11-20 NOTE — LETTER
November 20, 2018      Anne Cortez  [address] [facility] [address] [facility] [id number]        To Whom It May Concern:          Anne Cortez is under my medical care. She may return to work part- time beginning    12-03-18 working 20 hours per week. She will be re- evaluated 01-02-19 with further     recommendations for return to work. This in addition to the work- ability returned to [place]     office 11-12-18.           Sincerely,        Yrn Lopez MD/lou

## 2018-11-26 ENCOUNTER — OFFICE VISIT (OUTPATIENT)
Dept: FAMILY MEDICINE | Facility: CLINIC | Age: 43
End: 2018-11-26
Payer: COMMERCIAL

## 2018-11-26 VITALS
SYSTOLIC BLOOD PRESSURE: 110 MMHG | HEART RATE: 88 BPM | WEIGHT: 293 LBS | TEMPERATURE: 98.6 F | HEIGHT: 68 IN | DIASTOLIC BLOOD PRESSURE: 82 MMHG | BODY MASS INDEX: 44.41 KG/M2 | RESPIRATION RATE: 18 BRPM

## 2018-11-26 DIAGNOSIS — H65.01 RIGHT ACUTE SEROUS OTITIS MEDIA, RECURRENCE NOT SPECIFIED: Primary | ICD-10-CM

## 2018-11-26 PROCEDURE — 99213 OFFICE O/P EST LOW 20 MIN: CPT | Performed by: FAMILY MEDICINE

## 2018-11-26 NOTE — MR AVS SNAPSHOT
After Visit Summary   11/26/2018    Anne Cortez    MRN: [id number]           Patient Information     Date Of Birth          1975        Visit Information        Provider Department      11/26/2018 9:00 AM Yrn Lopez MD [facility]        Today's Diagnoses     Right acute serous otitis media, recurrence not specified    -  1      Care Instructions      Earache, No Infection (Adult)  Earaches can happen without an infection. This occurs when air and fluid build up behind the eardrum causing a feeling of fullness and discomfort and reduced hearing. This is called otitis media with effusion (OME) or serous otitis media. It means there is fluid in the middle ear. It is not the same as acute otitis media, which is typically from infection.  OME can happen when you have a cold if congestion blocks the passage that drains the middle ear. This passage is called the eustachian tube. OME may also occur with nasal allergies or after a bacterial middle ear infection.    The pain or discomfort may come and go. You may hear clicking or popping sounds when you chew or swallow. You may feel that your balance is off. Or you may hear ringing in the ear.  It often takes from several weeks up to 3 months for the fluid to clear on its own. Oral pain relievers and ear drops help if there is pain. Decongestants and antihistamines sometimes help. Antibiotics don't help since there is no infection. Your doctor may prescribe a nasal spray to help reduce swelling in the nose and eustachian tube. This can allow the ear to drain.  If your OME doesn't improve after 3 months, surgery may be used to drain the fluid and insert a small tube in the eardrum to allow continued drainage.  Because the middle ear fluid can become infected, it is important to watch for signs of an ear infection which may develop later. These signs include increased ear pain, fever, or drainage from the ear.  Home care  The  following guidelines will help you care for yourself at home:    You may use over-the-counter medicine as directed to control pain, unless another medicine was prescribed. If you have chronic liver or kidney disease or ever had a stomach ulcer or GI bleeding, talk with your doctor before using these medicines. Aspirin should never be used in anyone under 18 years of age who is ill with a fever. It may cause severe liver damage.    You may use over-the-counter decongestants such as phenylephrine or pseudoephedrine. But they are not always helpful. Don't use nasal spray decongestants more than 3 days. Longer use can make congestion worse. Prescription nasal sprays from your doctor don't typically have those restrictions.    Antihistamines may help if you are also having allergy symptoms.    You may use medicines such as guaifenesin to thin mucus and promote drainage.  Follow-up care  Follow up with your healthcare provider or as advised if you are not feeling better after 3 days.  When to seek medical advice  Call your healthcare provider right away if any of the following occur:    Your ear pain gets worse or does not start to improve     Fever of 100.4 F (38 C) or higher, or as directed by your healthcare provider    Fluid or blood draining from the ear    Headache or sinus pain    Stiff neck    Unusual drowsiness or confusion  Date Last Reviewed: 10/1/2016    [id number] [employer]. [address] [place] [address]. All rights reserved. This information is not intended as a substitute for professional medical care. Always follow your healthcare professional's instructions.                Follow-ups after your visit        Your next 10 appointments already scheduled     Jan 25, 2019  9:15 AM CST   US LOWER EXTREMITY VENOUS DUPLEX LEFT with WYUS1   [facility] ([facility])    [address] [facility] [address]   [phone]           How do I prepare for  my exam? (Food and drink instructions) No Food and Drink Restrictions.  How do I prepare for my exam? (Other instructions) You do not need to do anything special to prepare for your exam.  What should I wear: Wear comfortable clothes.  How long does the exam take: Most ultrasounds take 30 to 60 minutes.  What should I bring: Bring a list of your medicines, including vitamins, minerals and over-the-counter drugs. It is safest to leave personal items at home.  Do I need a :  No  is needed.  What do I need to tell my doctor: Tell your doctor about any allergies you may have.  What should I do after the exam: No restrictions, You may resume normal activities.  What is this test: An ultrasound uses sound waves to make pictures of the body. Sound waves do not cause pain. The only discomfort may be the pressure of the wand against your skin or full bladder.  Who should I call with questions: If you have any questions, please call the Imaging Department where you will have your exam. Directions, parking instructions, and other information is available on our website, [employer].            Jan 25, 2019  9:50 AM CST   LAB with [facility] ([facility])    [address] [facility] [id number] [phone]           Please do not eat 10-12 hours before your appointment if you are coming in fasting for labs on lipids, cholesterol, or glucose (sugar). This does not apply to pregnant women. Water, hot tea and black coffee (with nothing added) are okay. Do not drink other fluids, diet soda or chew gum.            Jan 30, 2019 11:20 AM CST   Return Visit with Zafar Castro MD   [facility] ([facility])    [facility]  [address] [address]  [facility] [id number] [phone]              Who to contact     If you have questions or need follow up information about today's clinic visit or your schedule  "please contact [facility] directly at [phone].  Normal or non-critical lab and imaging results will be communicated to you by MyChart, letter or phone within 4 business days after the clinic has received the results. If you do not hear from us within 7 days, please contact the clinic through MyChart or phone. If you have a critical or abnormal lab result, we will notify you by phone as soon as possible.  Submit refill requests through [employer] or call your pharmacy and they will forward the refill request to us. Please allow 3 business days for your refill to be completed.          Additional Information About Your Visit        [email or web address] Information     [employer] gives you secure access to your electronic health record. If you see a primary care provider, you can also send messages to your care team and make appointments. If you have questions, please call your primary care clinic.  If you do not have a primary care provider, please call [phone] and they will assist you.        Care EveryWhere ID     This is your Care EveryWhere ID. This could be used by other organizations to access your [place] medical records  [id number]        Your Vitals Were     Pulse Temperature Respirations Height Breastfeeding? BMI (Body Mass Index)    88 98.6  F (37  C) (Tympanic) 18 5' 8\" (1.727 m) No 48.96 kg/m2       Blood Pressure from Last 3 Encounters:   11/26/18 110/82   11/15/18 104/80   11/12/18 130/82    Weight from Last 3 Encounters:   11/26/18 322 lb (146.1 kg)   11/15/18 322 lb (146.1 kg)   11/12/18 322 lb (146.1 kg)              Today, you had the following     No orders found for display       Primary Care Provider Office Phone # Fax #    Yrn Mohinder Lopez [phone]       [address] [facility] [address]        Equal Access to Services     BROOKS ROBINS : Azucena ortezo Soritika, waaxda luqadaha, qaybta kaalmada adeegyada, atif yarbrough. So wa " [phone].    ATENCIÓN: Si david arguelles, tiene a ceja disposición servicios gratuitos de asistencia lingüística. Lynette paris [phone].    We comply with applicable federal civil rights laws and [place] laws. We do not discriminate on the basis of race, color, national origin, age, disability, sex, sexual orientation, or gender identity.            Thank you!     Thank you for choosing [facility]  for your care. Our goal is always to provide you with excellent care. Hearing back from our patients is one way we can continue to improve our services. Please take a few minutes to complete the written survey that you may receive in the mail after your visit with us. Thank you!             Your Updated Medication List - Protect others around you: Learn how to safely use, store and throw away your medicines at www.disposemymeds.org.          This list is accurate as of 11/26/18  9:20 AM.  Always use your most recent med list.                   Brand Name Dispense Instructions for use Diagnosis    acetaminophen 325 MG tablet    TYLENOL     Take 325-650 mg by mouth        levonorgestrel 20 MCG/24HR IUD    MIRENA     1 each by Intrauterine route once        rivaroxaban ANTICOAGULANT 20 MG Tabs tablet    XARELTO    30 tablet    Take 1 tablet (20 mg) by mouth daily (with dinner)    Chronic deep vein thrombosis (DVT) of proximal vein of lower extremity, unspecified laterality (H)       VITAMIN D PO      Take 4,000 Units by mouth daily

## 2018-11-26 NOTE — NURSING NOTE
"Chief Complaint   Patient presents with     Otalgia       Initial /82 (Cuff Size: Adult Large)  Pulse 88  Temp 98.6  F (37  C) (Tympanic)  Resp 18  Ht 5' 8\" (1.727 m)  Wt 322 lb (146.1 kg)  Breastfeeding? No  BMI 48.96 kg/m2 Estimated body mass index is 48.96 kg/(m^2) as calculated from the following:    Height as of this encounter: 5' 8\" (1.727 m).    Weight as of this encounter: 322 lb (146.1 kg).    Patient presents to the clinic using No DME    Health Maintenance that is potentially due pending provider review:  NONE    n/a    Is there anyone who you would like to be able to receive your results? Not Applicable  If yes have patient fill out LOIS    "
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
## 2018-11-26 NOTE — PATIENT INSTRUCTIONS
Earache, No Infection (Adult)  Earaches can happen without an infection. This occurs when air and fluid build up behind the eardrum causing a feeling of fullness and discomfort and reduced hearing. This is called otitis media with effusion (OME) or serous otitis media. It means there is fluid in the middle ear. It is not the same as acute otitis media, which is typically from infection.  OME can happen when you have a cold if congestion blocks the passage that drains the middle ear. This passage is called the eustachian tube. OME may also occur with nasal allergies or after a bacterial middle ear infection.    The pain or discomfort may come and go. You may hear clicking or popping sounds when you chew or swallow. You may feel that your balance is off. Or you may hear ringing in the ear.  It often takes from several weeks up to 3 months for the fluid to clear on its own. Oral pain relievers and ear drops help if there is pain. Decongestants and antihistamines sometimes help. Antibiotics don't help since there is no infection. Your doctor may prescribe a nasal spray to help reduce swelling in the nose and eustachian tube. This can allow the ear to drain.  If your OME doesn't improve after 3 months, surgery may be used to drain the fluid and insert a small tube in the eardrum to allow continued drainage.  Because the middle ear fluid can become infected, it is important to watch for signs of an ear infection which may develop later. These signs include increased ear pain, fever, or drainage from the ear.  Home care  The following guidelines will help you care for yourself at home:    You may use over-the-counter medicine as directed to control pain, unless another medicine was prescribed. If you have chronic liver or kidney disease or ever had a stomach ulcer or GI bleeding, talk with your doctor before using these medicines. Aspirin should never be used in anyone under 18 years of age who is ill with a fever. It  may cause severe liver damage.    You may use over-the-counter decongestants such as phenylephrine or pseudoephedrine. But they are not always helpful. Don't use nasal spray decongestants more than 3 days. Longer use can make congestion worse. Prescription nasal sprays from your doctor don't typically have those restrictions.    Antihistamines may help if you are also having allergy symptoms.    You may use medicines such as guaifenesin to thin mucus and promote drainage.  Follow-up care  Follow up with your healthcare provider or as advised if you are not feeling better after 3 days.  When to seek medical advice  Call your healthcare provider right away if any of the following occur:    Your ear pain gets worse or does not start to improve     Fever of 100.4 F (38 C) or higher, or as directed by your healthcare provider    Fluid or blood draining from the ear    Headache or sinus pain    Stiff neck    Unusual drowsiness or confusion  Date Last Reviewed: 10/1/2016    6634-0069 [employer]. [address] [place] [address], [place] [address]. All rights reserved. This information is not intended as a substitute for professional medical care. Always follow your healthcare professional's instructions.

## 2019-01-03 ENCOUNTER — OFFICE VISIT (OUTPATIENT)
Dept: FAMILY MEDICINE | Facility: CLINIC | Age: 44
End: 2019-01-03
Payer: COMMERCIAL

## 2019-01-03 VITALS
HEIGHT: 68 IN | BODY MASS INDEX: 44.41 KG/M2 | SYSTOLIC BLOOD PRESSURE: 110 MMHG | WEIGHT: 293 LBS | HEART RATE: 68 BPM | RESPIRATION RATE: 18 BRPM | DIASTOLIC BLOOD PRESSURE: 80 MMHG | TEMPERATURE: 97.8 F
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
## 2019-01-03 DIAGNOSIS — H65.23 BILATERAL CHRONIC SEROUS OTITIS MEDIA: ICD-10-CM

## 2019-01-03 DIAGNOSIS — Z01.419 WELL FEMALE EXAM WITH ROUTINE GYNECOLOGICAL EXAM: ICD-10-CM

## 2019-01-03 DIAGNOSIS — I87.009 POST-THROMBOTIC SYNDROME: ICD-10-CM

## 2019-01-03 LAB
CHOLEST SERPL-MCNC: 248 MG/DL
GLUCOSE BLD-MCNC: 91 MG/DL (ref 70–99)
HDLC SERPL-MCNC: 39 MG/DL
LDLC SERPL CALC-MCNC: 169 MG/DL
NONHDLC SERPL-MCNC: 209 MG/DL
TRIGL SERPL-MCNC: 199 MG/DL

## 2019-01-03 PROCEDURE — 36415 COLL VENOUS BLD VENIPUNCTURE: CPT | Performed by: OBSTETRICS & GYNECOLOGY

## 2019-01-03 PROCEDURE — 80061 LIPID PANEL: CPT | Performed by: OBSTETRICS & GYNECOLOGY

## 2019-01-03 PROCEDURE — 99213 OFFICE O/P EST LOW 20 MIN: CPT | Performed by: FAMILY MEDICINE

## 2019-01-03 PROCEDURE — 82947 ASSAY GLUCOSE BLOOD QUANT: CPT | Performed by: OBSTETRICS & GYNECOLOGY

## 2019-01-03 ASSESSMENT — MIFFLIN-ST. JEOR: SCORE: 2155.01
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
## 2019-01-03 NOTE — LETTER
January 3, 2019      Anne Cortez  [id number] [id number]        To Whom It May Concern:        Anne Cortez was seen in our clinic. Suggested to continue part time job for the next 4 weeks (till 01/25/2018) considering ongoing post thrombotic syndrome symptoms.            Sincerely,            Yrn Lopez MD

## 2019-01-03 NOTE — NURSING NOTE
"Chief Complaint   Patient presents with     Musculoskeletal Problem     Ear Problem       Initial /80 (Cuff Size: Adult Large)   Pulse 68   Temp 97.8  F (36.6  C) (Tympanic)   Resp 18   Ht 1.727 m (5' 8\")   Wt 145.2 kg (320 lb)   Breastfeeding? No   BMI 48.66 kg/m   Estimated body mass index is 48.66 kg/m  as calculated from the following:    Height as of this encounter: 1.727 m (5' 8\").    Weight as of this encounter: 145.2 kg (320 lb).    Patient presents to the clinic using No DME    Health Maintenance that is potentially due pending provider review:  NONE    n/a    Is there anyone who you would like to be able to receive your results? Not Applicable  If yes have patient fill out LOIS    "

## 2019-01-03 NOTE — RESULT ENCOUNTER NOTE
Please ensure that patient is aware of her results and that I sent her my recommendations in her MyChart.   Thank you.     Gina Hooks MD  [facility]

## 2019-01-03 NOTE — PATIENT INSTRUCTIONS
Patient Education     Treatment for Post-Thrombotic Syndrome (PTS)  Post-thrombotic syndrome (PTS) is a condition caused by damage to veins from a blood clot. PTS can cause chronic pain, swelling, and other symptoms in your leg. It may develop in the weeks or months after a deep vein thrombosis (DVT) of the leg. DVT is a common condition, especially in people older than 65. Post-thrombotic syndrome affects a large number of people who have had DVT. It can happen in men and women of any age.  Types of treatment  Compression is the main treatment for post-thrombotic syndrome. This helps to increase the blood flow in the veins, and ease your symptoms.  You may be given prescription-grade compression stockings. These apply more pressure than the type you can buy over the counter. These are worn during the day, on the leg that had the DVT. You also may also be given an intermittent pneumatic compression (IPC) device. This device applies pressure on the veins of your leg.  Proper skin care is also key. You healthcare provider may tell you to use a product to lubricate your skin, such as petroleum jelly. Barrier creams that contain zinc oxide can also be helpful. You may need a steroid cream or ointment to treat your skin. If you develop leg sores (pressure injuries), they may need special treatment.  In some cases, your healthcare provider may advise surgery. This can be done to remove a blockage in a major vein. It can also be done to repair the valves in your leg veins.  Living with post-thrombotic syndrome  Symptoms often get better with treatment, but your symptoms may not all go away. It may help if you:    Walk every day    Do daily ankle flexing exercises    Raise your legs for a period of time, several times a day  Possible complications of post-thrombotic syndrome  Post-thrombotic syndrome can cause leg sores. If so, you will need to have wound care. Aspirin and a medicine called pentoxifylline may help  aid healing of pressure injuries. If a pressure injury becomes infected, you may need antibiotics. Severe pressure injuries that don't get better with medicines and wound care therapy may need surgery to remove the damaged tissue.  Preventing post-thrombotic syndrome  You can reduce your risk for post-thrombotic syndrome by lowering your risk for DVT. Not moving or walking for long periods of time raises your risk for DVT. If you are immobile because of a health condition or surgery, your healthcare provider will tell you how to prevent DVT. This may include:    Taking blood-thinner medicine, such as warfarin    Using compression stockings    Using a compression device    Moving and walking as soon as you are able  Treating DVT right away is the best way to prevent post-thrombotic syndrome. Take blood-thinner medicine exactly as prescribed. Don't miss any follow-up tests to check your blood levels of the medicine. Use your compression devices exactly as prescribed.  When to call the healthcare provider  Call your healthcare provider right away if you have any of these:    A sore on your leg that is warm, red, or leaks fluid    Fever of 100.4 F (38 C) or higher, or as directed by your healthcare provider    Symptoms of DVT, such as swelling, pain, or warmth in your leg   Date Last Reviewed: 6/1/2018 2000-2018 [employer]. [address] [place] [address], [place] [address]. All rights reserved. This information is not intended as a substitute for professional medical care. Always follow your healthcare professional's instructions.

## 2019-01-16 NOTE — PROGRESS NOTES
Outpatient Physical Therapy Discharge Note     Patient: Anne Cortez  : 1975    Evaluation date:  18     Referring Provider: Dr Lopez    Therapy Diagnosis: general deconditioning and LE weakness    Client Self Report:   Current status is unknown since patient did not return for further PT visits.    Objective Measurements:  Current objective status is unknown since patient failed to complete treatment     Goals:  Goal Identifier lumbar   Goal Description Pt will be able to demonstrate full lumbar ROM w less than 1/10 pain to be able to complete ADL's at home   Target Date 18   Date Met      Progress:unable to assess as pt failed to return for f/u      Goal Identifier endurance   Goal Description Pt will be able to walk 60 min w less than 1/10 pain/fatigue to be able to go grocery shopping.    Target Date 18   Date Met      Progress:unable to assess as pt failed to return for f/u      Goal Identifier working    Goal Description Pt will be able to return to work full time w less than 1/10 pain in LBP   Target Date 19   Date Met      Progress:unable to assess as pt failed to return for f/u      Goal Identifier LEFS   Goal Description Pt will score 60/80 on LEFS outcome tool to demonstrate clinically significant improvement and be able to complete more tasks at home   Target Date 19   Date Met      Progress:unable to assess as pt failed to return for f/u        Progress Toward Goals:   Progress this reporting period: Pt has failed to schedule f/u visits within 30 days from last visit thus is being d/c from therapy at this time. Objective measures are all taken from last visit. Current status is unknown at this time.          Plan:  Discharge from therapy.    Discharge:    Reason for Discharge: Patient has failed to schedule further appointments.        Equipment Issued: none    Discharge Plan:  Not completed since patient failed to schedule further appointments.    Ysabel Joshi   Physical Therapist #[id number]  [facility]  [address] [place] [id number]  [email or web address]   [email or web address]   Office: [phone] Fax: [phone]

## 2019-01-16 NOTE — ADDENDUM NOTE
Encounter addended by: Ysabel Joshi, PT on: 1/16/2019 2:33 PM   Actions taken: Pend clinical note, Flowsheet accepted, Sign clinical note, Episode resolved

## 2019-01-24 ENCOUNTER — OFFICE VISIT (OUTPATIENT)
Dept: FAMILY MEDICINE | Facility: CLINIC | Age: 44
End: 2019-01-24
Payer: COMMERCIAL

## 2019-01-24 VITALS
TEMPERATURE: 97.8 F | HEART RATE: 68 BPM | RESPIRATION RATE: 18 BRPM | WEIGHT: 293 LBS | DIASTOLIC BLOOD PRESSURE: 80 MMHG | SYSTOLIC BLOOD PRESSURE: 114 MMHG | HEIGHT: 68 IN | BODY MASS INDEX: 44.41 KG/M2

## 2019-01-24 DIAGNOSIS — S25.01XS: ICD-10-CM

## 2019-01-24 DIAGNOSIS — I87.009 POST-THROMBOTIC SYNDROME: ICD-10-CM

## 2019-01-24 DIAGNOSIS — Z86.718 HISTORY OF DEEP VENOUS THROMBOSIS: ICD-10-CM
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
## 2019-01-24 DIAGNOSIS — D68.51 FACTOR V LEIDEN MUTATION (H): ICD-10-CM

## 2019-01-24 DIAGNOSIS — D68.52 PROTHROMBIN GENE MUTATION (H): ICD-10-CM

## 2019-01-24 DIAGNOSIS — S22.42XS CLOSED FRACTURE OF MULTIPLE RIBS OF LEFT SIDE, SEQUELA: ICD-10-CM

## 2019-01-24 PROCEDURE — 99213 OFFICE O/P EST LOW 20 MIN: CPT | Performed by: FAMILY MEDICINE

## 2019-01-24 ASSESSMENT — MIFFLIN-ST. JEOR: SCORE: 2136.87
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
## 2019-01-25 ENCOUNTER — HOSPITAL ENCOUNTER (OUTPATIENT)
Dept: ULTRASOUND IMAGING | Facility: CLINIC | Age: 44
Discharge: HOME | End: 2019-01-25
Attending: INTERNAL MEDICINE | Admitting: INTERNAL MEDICINE
Payer: COMMERCIAL

## 2019-01-25 ENCOUNTER — HOSPITAL ENCOUNTER (OUTPATIENT)
Dept: LAB | Facility: CLINIC | Age: 44
End: 2019-01-25
Attending: INTERNAL MEDICINE
Payer: COMMERCIAL

## 2019-01-25 DIAGNOSIS — I82.5Y9 CHRONIC DEEP VEIN THROMBOSIS (DVT) OF PROXIMAL VEIN OF LOWER EXTREMITY, UNSPECIFIED LATERALITY (H): ICD-10-CM

## 2019-01-25 LAB — D DIMER PPP FEU-MCNC: 0.5 UG/ML FEU (ref 0–0.5)

## 2019-01-25 PROCEDURE — 85379 FIBRIN DEGRADATION QUANT: CPT | Performed by: INTERNAL MEDICINE

## 2019-01-25 PROCEDURE — 36415 COLL VENOUS BLD VENIPUNCTURE: CPT | Performed by: INTERNAL MEDICINE

## 2019-01-25 PROCEDURE — 93971 EXTREMITY STUDY: CPT | Mod: LT

## 2019-01-28 ENCOUNTER — TELEPHONE (OUTPATIENT)
Dept: FAMILY MEDICINE | Facility: CLINIC | Age: 44
End: 2019-01-28

## 2019-02-06 ENCOUNTER — ONCOLOGY VISIT (OUTPATIENT)
Dept: ONCOLOGY | Facility: CLINIC | Age: 44
End: 2019-02-06
Attending: INTERNAL MEDICINE
Payer: COMMERCIAL

## 2019-02-06 VITALS
BODY MASS INDEX: 44.41 KG/M2 | OXYGEN SATURATION: 99 % | HEART RATE: 96 BPM | TEMPERATURE: 98.6 F | HEIGHT: 68 IN | DIASTOLIC BLOOD PRESSURE: 97 MMHG | SYSTOLIC BLOOD PRESSURE: 141 MMHG | WEIGHT: 293 LBS | RESPIRATION RATE: 16 BRPM

## 2019-02-06 DIAGNOSIS — D68.52 PROTHROMBIN GENE MUTATION (H): ICD-10-CM

## 2019-02-06 DIAGNOSIS — D68.51 FACTOR V LEIDEN CARRIER (H): Primary | ICD-10-CM

## 2019-02-06 DIAGNOSIS — I82.5Y9 CHRONIC DEEP VEIN THROMBOSIS (DVT) OF PROXIMAL VEIN OF LOWER EXTREMITY, UNSPECIFIED LATERALITY (H): ICD-10-CM

## 2019-02-06 PROCEDURE — G0463 HOSPITAL OUTPT CLINIC VISIT: HCPCS

## 2019-02-06 PROCEDURE — 99213 OFFICE O/P EST LOW 20 MIN: CPT | Performed by: INTERNAL MEDICINE

## 2019-02-06 ASSESSMENT — MIFFLIN-ST. JEOR: SCORE: 2159.42

## 2019-02-06 ASSESSMENT — PAIN SCALES - GENERAL: PAINLEVEL: NO PAIN (0)

## 2019-02-06 NOTE — PATIENT INSTRUCTIONS
Dr. Castro would like to see you if there are future concerns or worsening symptoms. There are no future appointments scheduled at this time.     When you are in need of a refill, please call your pharmacy and they will send us a request.      Copy of appointments, and after visit summary (AVS) given to patient.      If you have any questions during business hours (M-F 8 AM- 4PM), please call Monserrat Mcdermott RN Oncology Hematology  [facility] [phone].       For questions after business hours, or on holidays/weekends, please call our after hours Nurse Triage line [phone]. Thank you.

## 2019-02-06 NOTE — LETTER
2/6/2019         RE: Anne Cortez  [address] [place] [address]        Dear Colleague,    Thank you for referring your patient, Anne Cortez, to the [facility]. Please see a copy of my visit note below.    DATE OF VISIT: Feb 6, 2019    Anne Cortez is a 43 year old female is seen today for   Chief Complaint   Patient presents with     Hematology     3 month recheck Factor V Leiden carrier   .       (D68.51) Factor V Leiden carrier (H)  (primary encounter diagnosis)  (I82.5Y9) Chronic deep vein thrombosis (DVT) of proximal vein of lower extremity, unspecified laterality (H)  (D68.52) Prothrombin gene mutation (H)  I reviewed with the patient today management of thromboembolic disease.  The patient has both factor V Leiden mutation as well as prothrombin gene mutation.  She is also has varicose vein and morbid obesity.  All this will increase her risk for recurrent of thromboembolic disease.  I would recommend patient to continue on long-term anticoagulation with Xarelto.  I discussed with the patient follow-up and prognosis.  We also talked about predisposing and precipitating factors for thromboembolic disease as well as warning signs of the disease.  I have not scheduled the patient for further appointments I will see her in the future if there is new developments or concerns.    The patient is ready to learn, no apparent learning barriers were identified.  Diagnosis and treatment plans were explained to the patient. The patient expressed understanding of the content. The patient asked appropriate questions. The patient questions were answered to her satisfaction.  Time spent 15 minutes more than 50% of the time in counseling and coordination of care including discussion of management of thromboembolic disease, indication and potential side effects of anticoagulation, follow-up and prognosis.  Chart documentation with Dragon Voice recognition Software. Although reviewed  after completion, some words and grammatical errors may remain.    Again, thank you for allowing me to participate in the care of your patient.        Sincerely,        Zafar Castro MD

## 2019-02-06 NOTE — NURSING NOTE
"Oncology Rooming Note    February 6, 2019 3:07 PM   Anne Cortez is a 43 year old female who presents for:    Chief Complaint   Patient presents with     Hematology     3 month recheck Factor V Leiden carrier     Initial Vitals: BP (!) 141/97 (BP Location: Right arm, Patient Position: Sitting, Cuff Size: Adult Large)   Pulse 96   Temp 98.6  F (37  C) (Tympanic)   Resp 16   Ht 1.727 m (5' 7.99\")   Wt 145.6 kg (321 lb)   SpO2 99%   BMI 48.82 kg/m   Estimated body mass index is 48.82 kg/m  as calculated from the following:    Height as of this encounter: 1.727 m (5' 7.99\").    Weight as of this encounter: 145.6 kg (321 lb). Body surface area is 2.64 meters squared.  No Pain (0) Comment: Data Unavailable   No LMP recorded. Patient is not currently having periods (Reason: IUD).  Allergies reviewed: Yes  Medications reviewed: Yes    Medications: MEDICATION REFILLS NEEDED TODAY. Provider was notified.  Pharmacy name entered into [employer]: [facility] [place], [place] - [address] [facility]    Clinical concerns: 3 month recheck Factor V Leiden carrier    7 minutes for nursing intake (face to face time)     Rasheeda Bazzi CMA              "

## 2019-02-07 NOTE — PROGRESS NOTES
DATE OF VISIT: Feb 6, 2019    Anne Cortez is a 43 year old female is seen today for   Chief Complaint   Patient presents with     Hematology     3 month recheck Factor V Leiden carrier   .       (D68.51) Factor V Leiden carrier (H)  (primary encounter diagnosis)  (I82.5Y9) Chronic deep vein thrombosis (DVT) of proximal vein of lower extremity, unspecified laterality (H)  (D68.52) Prothrombin gene mutation (H)  I reviewed with the patient today management of thromboembolic disease.  The patient has both factor V Leiden mutation as well as prothrombin gene mutation.  She is also has varicose vein and morbid obesity.  All this will increase her risk for recurrent of thromboembolic disease.  I would recommend patient to continue on long-term anticoagulation with Xarelto.  I discussed with the patient follow-up and prognosis.  We also talked about predisposing and precipitating factors for thromboembolic disease as well as warning signs of the disease.  I have not scheduled the patient for further appointments I will see her in the future if there is new developments or concerns.    The patient is ready to learn, no apparent learning barriers were identified.  Diagnosis and treatment plans were explained to the patient. The patient expressed understanding of the content. The patient asked appropriate questions. The patient questions were answered to her satisfaction.  Time spent 15 minutes more than 50% of the time in counseling and coordination of care including discussion of management of thromboembolic disease, indication and potential side effects of anticoagulation, follow-up and prognosis.  Chart documentation with Dragon Voice recognition Software. Although reviewed after completion, some words and grammatical errors may remain.

## 2019-02-14 ENCOUNTER — MYC MEDICAL ADVICE (OUTPATIENT)
Dept: FAMILY MEDICINE | Facility: CLINIC | Age: 44
End: 2019-02-14

## 2019-02-14 ENCOUNTER — OFFICE VISIT (OUTPATIENT)
Dept: AUDIOLOGY | Facility: CLINIC | Age: 44
End: 2019-02-14
Payer: COMMERCIAL

## 2019-02-14 ENCOUNTER — OFFICE VISIT (OUTPATIENT)
Dept: OTOLARYNGOLOGY | Facility: CLINIC | Age: 44
End: 2019-02-14
Payer: COMMERCIAL

## 2019-02-14 VITALS
SYSTOLIC BLOOD PRESSURE: 129 MMHG | BODY MASS INDEX: 44.41 KG/M2 | WEIGHT: 293 LBS | HEIGHT: 68 IN | DIASTOLIC BLOOD PRESSURE: 87 MMHG | HEART RATE: 94 BPM | TEMPERATURE: 98.2 F

## 2019-02-14 DIAGNOSIS — H91.93 HIGH FREQUENCY HEARING LOSS, BILATERAL: Primary | ICD-10-CM

## 2019-02-14 DIAGNOSIS — R29.898 LEFT LEG WEAKNESS: Primary | ICD-10-CM

## 2019-02-14 DIAGNOSIS — H65.20 CHRONIC SEROUS OTITIS MEDIA, UNSPECIFIED LATERALITY: Primary | ICD-10-CM

## 2019-02-14 PROCEDURE — 92567 TYMPANOMETRY: CPT | Performed by: AUDIOLOGIST

## 2019-02-14 PROCEDURE — 99207 ZZC NO CHARGE LOS: CPT | Performed by: AUDIOLOGIST

## 2019-02-14 PROCEDURE — 92557 COMPREHENSIVE HEARING TEST: CPT | Performed by: AUDIOLOGIST

## 2019-02-14 PROCEDURE — 99203 OFFICE O/P NEW LOW 30 MIN: CPT | Performed by: OTOLARYNGOLOGY

## 2019-02-14 ASSESSMENT — MIFFLIN-ST. JEOR: SCORE: 2159.39
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
## 2019-02-14 NOTE — TELEPHONE ENCOUNTER
RN spoke with PT who states pt needs new order for lymphedema. LM for pt to call clinic nurse.   KYM Joshi RN

## 2019-02-14 NOTE — NURSING NOTE
"Initial /87 (BP Location: Right arm, Patient Position: Sitting, Cuff Size: Adult Large)   Pulse 94   Temp 98.2  F (36.8  C) (Oral)   Ht 1.727 m (5' 7.99\")   Wt 145.6 kg (321 lb)   BMI 48.82 kg/m   Estimated body mass index is 48.82 kg/m  as calculated from the following:    Height as of this encounter: 1.727 m (5' 7.99\").    Weight as of this encounter: 145.6 kg (321 lb). .    Allison Peter CMA    "
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
## 2019-02-15 ENCOUNTER — TELEPHONE (OUTPATIENT)
Dept: ONCOLOGY | Facility: CLINIC | Age: 44
End: 2019-02-15

## 2019-02-15 DIAGNOSIS — I82.5Y9 CHRONIC DEEP VEIN THROMBOSIS (DVT) OF PROXIMAL VEIN OF LOWER EXTREMITY, UNSPECIFIED LATERALITY (H): Primary | ICD-10-CM

## 2019-02-15 NOTE — TELEPHONE ENCOUNTER
Patient has a hx of chronic DVT, factor V Leiden mutation,prothrombin gene mutation, varicose veins and morbid obesity.  She is on long-term anticoagulation with Xarelto. Patient is going to get fitted for compression stockings.     Karen is going to be ordering patient's compression stockings. They were originally ordered on 1/3/19 from Dr. Lopez.Order does not have length of stockings or amount of pressure. Patient told Karen this was discussed with Dr. Castro.     She is requesting a new order with length, and amount of pressure for each leg. Karen was advised that Dr. Castro will not be in clinic until next week. She was ok with waiting until he returned.  Will route to Dr. Salmeron for his recommendations  Monserrat Mcdermott RN

## 2019-02-22 ENCOUNTER — HOSPITAL ENCOUNTER (OUTPATIENT)
Dept: PHYSICAL THERAPY | Facility: CLINIC | Age: 44
Setting detail: THERAPIES SERIES
End: 2019-02-22
Attending: FAMILY MEDICINE
Payer: COMMERCIAL

## 2019-02-22 DIAGNOSIS — R29.898 LEFT LEG WEAKNESS: ICD-10-CM

## 2019-02-22 PROCEDURE — 97162 PT EVAL MOD COMPLEX 30 MIN: CPT | Mod: GP | Performed by: PHYSICAL THERAPIST

## 2019-02-22 PROCEDURE — 97110 THERAPEUTIC EXERCISES: CPT | Mod: GP | Performed by: PHYSICAL THERAPIST

## 2019-02-27 ENCOUNTER — HOSPITAL ENCOUNTER (OUTPATIENT)
Dept: PHYSICAL THERAPY | Facility: CLINIC | Age: 44
Setting detail: THERAPIES SERIES
End: 2019-02-27
Attending: FAMILY MEDICINE
Payer: COMMERCIAL

## 2019-02-27 PROCEDURE — 97110 THERAPEUTIC EXERCISES: CPT | Mod: GP | Performed by: PHYSICAL THERAPIST

## 2019-03-07 ENCOUNTER — HOSPITAL ENCOUNTER (OUTPATIENT)
Dept: PHYSICAL THERAPY | Facility: CLINIC | Age: 44
Setting detail: THERAPIES SERIES
End: 2019-03-07
Attending: FAMILY MEDICINE
Payer: COMMERCIAL

## 2019-03-07 PROCEDURE — 97110 THERAPEUTIC EXERCISES: CPT | Mod: GP | Performed by: PHYSICAL THERAPIST

## 2019-03-14 ENCOUNTER — HOSPITAL ENCOUNTER (OUTPATIENT)
Dept: PHYSICAL THERAPY | Facility: CLINIC | Age: 44
Setting detail: THERAPIES SERIES
End: 2019-03-14
Attending: FAMILY MEDICINE
Payer: COMMERCIAL

## 2019-03-14 PROCEDURE — 97110 THERAPEUTIC EXERCISES: CPT | Mod: GP | Performed by: PHYSICAL THERAPIST

## 2019-03-21 ENCOUNTER — HOSPITAL ENCOUNTER (OUTPATIENT)
Dept: PHYSICAL THERAPY | Facility: CLINIC | Age: 44
Setting detail: THERAPIES SERIES
End: 2019-03-21
Attending: FAMILY MEDICINE
Payer: COMMERCIAL

## 2019-03-21 PROCEDURE — 97110 THERAPEUTIC EXERCISES: CPT | Mod: GP | Performed by: PHYSICAL THERAPIST

## 2019-04-02 ENCOUNTER — OFFICE VISIT (OUTPATIENT)
Dept: DERMATOLOGY | Facility: CLINIC | Age: 44
End: 2019-04-02
Payer: COMMERCIAL

## 2019-04-02 VITALS — OXYGEN SATURATION: 100 % | DIASTOLIC BLOOD PRESSURE: 89 MMHG | HEART RATE: 74 BPM | SYSTOLIC BLOOD PRESSURE: 131 MMHG

## 2019-04-02 DIAGNOSIS — D22.9 MULTIPLE BENIGN NEVI: ICD-10-CM

## 2019-04-02 DIAGNOSIS — L82.0 INFLAMED SEBORRHEIC KERATOSIS: ICD-10-CM

## 2019-04-02 DIAGNOSIS — D18.01 CHERRY ANGIOMA: ICD-10-CM

## 2019-04-02 DIAGNOSIS — L82.1 SEBORRHEIC KERATOSIS: ICD-10-CM

## 2019-04-02 DIAGNOSIS — D48.5 NEOPLASM OF UNCERTAIN BEHAVIOR OF SKIN: Primary | ICD-10-CM

## 2019-04-02 PROCEDURE — 99213 OFFICE O/P EST LOW 20 MIN: CPT | Mod: 25 | Performed by: PHYSICIAN ASSISTANT

## 2019-04-02 PROCEDURE — 11102 TANGNTL BX SKIN SINGLE LES: CPT | Mod: 59 | Performed by: PHYSICIAN ASSISTANT

## 2019-04-02 PROCEDURE — 88305 TISSUE EXAM BY PATHOLOGIST: CPT | Mod: TC | Performed by: PHYSICIAN ASSISTANT

## 2019-04-02 PROCEDURE — 17110 DESTRUCTION B9 LES UP TO 14: CPT | Performed by: PHYSICIAN ASSISTANT
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
## 2019-04-02 NOTE — PATIENT INSTRUCTIONS
Wound Care Instructions     FOR SUPERFICIAL WOUNDS     [facility] [phone]    [facility] [phone]                 AFTER 24 HOURS YOU SHOULD REMOVE THE BANDAGE AND BEGIN DAILY DRESSING CHANGES AS FOLLOWS:     1) Remove Dressing.     2) Clean and dry the area with tap water using a Q-tip or sterile gauze pad.     3) Apply Vaseline, Aquaphor, Polysporin ointment or Bacitracin ointment over entire wound.  Do NOT use Neosporin ointment.     4) Cover the wound with a band-aid, or a sterile non-stick gauze pad and micropore paper tape      REPEAT THESE INSTRUCTIONS AT LEAST ONCE A DAY UNTIL THE WOUND HAS COMPLETELY HEALED.    It is an old wives tale that a wound heals better when it is exposed to air and allowed to dry out. The wound will heal faster with a better cosmetic result if it is kept moist with ointment and covered with a bandage.    **Do not let the wound dry out.**      Supplies Needed:      *Cotton tipped applicators (Q-tips)    *Polysporin Ointment or Bacitracin Ointment (NOT NEOSPORIN)    *Band-aids or non-stick gauze pads and micropore paper tape.      PATIENT INFORMATION:    During the healing process you will notice a number of changes. All wounds develop a small halo of redness surrounding the wound.  This means healing is occurring. Severe itching with extensive redness usually indicates sensitivity to the ointment or bandage tape used to dress the wound.  You should call our office if this develops.      Swelling  and/or discoloration around your surgical site is common, particularly when performed around the eye.    All wounds normally drain.  The larger the wound the more drainage there will be.  After 7-10 days, you will notice the wound beginning to shrink and new skin will begin to grow.  The wound is healed when you can see skin has formed over the entire area.  A healed wound has a healthy, shiny look to the surface and is red to dark pink in color to  normalize.  Wounds may take approximately 4-6 weeks to heal.  Larger wounds may take 6-8 weeks.  After the wound is healed you may discontinue dressing changes.    You may experience a sensation of tightness as your wound heals. This is normal and will gradually subside.    Your healed wound may be sensitive to temperature changes. This sensitivity improves with time, but if you re having a lot of discomfort, try to avoid temperature extremes.    Patients frequently experience itching after their wound appears to have healed because of the continue healing under the skin.  Plain Vaseline will help relieve the itching.        POSSIBLE COMPLICATIONS    BLEEDIN. Leave the bandage in place.  2. Use tightly rolled up gauze or a cloth to apply direct pressure over the bandage for 30  minutes.  3. Reapply pressure for an additional 30 minutes if necessary  4. Use additional gauze and tape to maintain pressure once the bleeding has stopped.    WOUND CARE INSTRUCTIONS   FOR CRYOSURGERY   This area treated with liquid nitrogen should form a blister (areas treated may or may not blister-skin may just turn dark and slough off). You do not need to bandage the area unless a blister forms and breaks (which may be a few days). When the blister breaks, begin daily dressing changes as follows:  1) Clean and dry the area with tap water using clean Q-tip or sterile gauze pad.   2) Apply Polysporin ointment or Bacitracin ointment over entire wound. Do NOT use Neosporin ointment.   3) Cover the wound with a band-aid or sterile non-stick gauze pad and micropore paper tape.   REPEAT THESE INSTRUCTIONS AT LEAST ONCE A DAY UNTIL THE WOUND HAS COMPLETELY HEALED.   It is an old wives tale that a wound heals better when it is exposed to air and allowed to dry out. The wound will heal faster with a better cosmetic result if it is kept moist with ointment and covered with a bandage.   Do not let the wound dry out.   IMPORTANT INFORMATION  ON REVERSE SIDE   Supplies Needed:   *Cotton tipped applicators (Q-tips)   *Polysporin ointment or Bacitracin ointment (NOT NEOSPORIN)   *Band-aids, or non stick gauze pads and micropore paper tape   PATIENT INFORMATION   During the healing process you will notice a number of changes. All wounds develop a small halo of redness surrounding the wound. This means healing is occurring. Severe itching with extensive redness usually indicates sensitivity to the ointment or bandage tape used to dress the wound. You should call our office if this develops.   Swelling and/or discoloration around your surgical site is common, particularly when performed around the eye.   All wounds normally drain. The larger the wound the more drainage there will be. After 7-10 days, you will notice the wound beginning to shrink and new skin will begin to grow. The wound is healed when you can see skin has formed over the entire area. A healed wound has a healthy, shiny look to the surface and is red to dark pink in color to normalize. Wounds may take approximately 4-6 weeks to heal. Larger wounds may take 6-8 weeks. After the wound is healed you may discontinue dressing changes.   You may experience a sensation of tightness as your wound heals. This is normal and will gradually subside.   Your healed wound may be sensitive to temperature changes. This sensitivity improves with time, but if you re having a lot of discomfort, try to avoid temperature extremes.   Patients frequently experience itching after their wound appears to have healed because of the continue healing under the skin. Plain Vaseline will help relieve the itching.
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
## 2019-04-03 LAB — COPATH REPORT: NORMAL

## 2019-04-04 ENCOUNTER — HOSPITAL ENCOUNTER (OUTPATIENT)
Dept: PHYSICAL THERAPY | Facility: CLINIC | Age: 44
Setting detail: THERAPIES SERIES
End: 2019-04-04
Attending: FAMILY MEDICINE
Payer: COMMERCIAL

## 2019-04-04 PROCEDURE — 97110 THERAPEUTIC EXERCISES: CPT | Mod: GP | Performed by: PHYSICAL THERAPIST

## 2019-04-19 ENCOUNTER — HOSPITAL ENCOUNTER (OUTPATIENT)
Dept: PHYSICAL THERAPY | Facility: CLINIC | Age: 44
Setting detail: THERAPIES SERIES
End: 2019-04-19
Attending: FAMILY MEDICINE
Payer: COMMERCIAL

## 2019-04-19 PROCEDURE — 97110 THERAPEUTIC EXERCISES: CPT | Mod: GP | Performed by: PHYSICAL THERAPIST

## 2019-04-19 PROCEDURE — 97140 MANUAL THERAPY 1/> REGIONS: CPT | Mod: GP | Performed by: PHYSICAL THERAPIST

## 2019-05-03 ENCOUNTER — HOSPITAL ENCOUNTER (OUTPATIENT)
Dept: PHYSICAL THERAPY | Facility: CLINIC | Age: 44
Setting detail: THERAPIES SERIES
End: 2019-05-03
Attending: FAMILY MEDICINE
Payer: COMMERCIAL

## 2019-05-03 PROCEDURE — 97110 THERAPEUTIC EXERCISES: CPT | Mod: GP | Performed by: PHYSICAL THERAPIST

## 2019-05-03 PROCEDURE — 97140 MANUAL THERAPY 1/> REGIONS: CPT | Mod: GP | Performed by: PHYSICAL THERAPIST

## 2019-05-23 ENCOUNTER — MYC MEDICAL ADVICE (OUTPATIENT)
Dept: FAMILY MEDICINE | Facility: CLINIC | Age: 44
End: 2019-05-23

## 2019-05-23 DIAGNOSIS — I82.5Y9 CHRONIC DEEP VEIN THROMBOSIS (DVT) OF PROXIMAL VEIN OF LOWER EXTREMITY, UNSPECIFIED LATERALITY (H): ICD-10-CM

## 2019-05-24 NOTE — TELEPHONE ENCOUNTER
Per 02-06-19 oncology visit-  D68.51) Factor V Leiden carrier (H)  (primary encounter diagnosis)  (I82.5Y9) Chronic deep vein thrombosis (DVT) of proximal vein of lower extremity, unspecified laterality (H)  (D68.52) Prothrombin gene mutation (H)  I reviewed with the patient today management of thromboembolic disease.  The patient has both factor V Leiden mutation as well as prothrombin gene mutation.  She is also has varicose vein and morbid obesity.  All this will increase her risk for recurrent of thromboembolic disease.  I would recommend patient to continue on long-term anticoagulation with Xarelto.  I discussed with the patient follow-up and prognosis.  We also talked about predisposing and precipitating factors for thromboembolic disease as well as warning signs of the disease.  I have not scheduled the patient for further appointments I will see her in the future if there is new developments or concerns.    Rx refilled.  KYM Joshi RN

## 2019-07-11 ENCOUNTER — OFFICE VISIT (OUTPATIENT)
Dept: FAMILY MEDICINE | Facility: CLINIC | Age: 44
End: 2019-07-11
Payer: COMMERCIAL

## 2019-07-11 VITALS
TEMPERATURE: 97.6 F | BODY MASS INDEX: 44.41 KG/M2 | RESPIRATION RATE: 18 BRPM | SYSTOLIC BLOOD PRESSURE: 138 MMHG | WEIGHT: 293 LBS | DIASTOLIC BLOOD PRESSURE: 82 MMHG | HEIGHT: 68 IN | HEART RATE: 68 BPM

## 2019-07-11 DIAGNOSIS — M54.5 BILATERAL LOW BACK PAIN, UNSPECIFIED CHRONICITY, WITH SCIATICA PRESENCE UNSPECIFIED: Primary | ICD-10-CM

## 2019-07-11 PROCEDURE — 99213 OFFICE O/P EST LOW 20 MIN: CPT | Performed by: FAMILY MEDICINE

## 2019-07-11 ASSESSMENT — MIFFLIN-ST. JEOR: SCORE: 2122.63

## 2019-07-11 ASSESSMENT — PAIN SCALES - GENERAL: PAINLEVEL: SEVERE PAIN (6)

## 2019-07-11 NOTE — NURSING NOTE
"Chief Complaint   Patient presents with     Musculoskeletal Problem     /82 (Cuff Size: Adult Regular)   Pulse 68   Temp 97.6  F (36.4  C) (Tympanic)   Resp 18   Ht 1.727 m (5' 7.99\")   Wt 142.4 kg (314 lb)   Breastfeeding? No   BMI 47.76 kg/m   Estimated body mass index is 47.76 kg/m  as calculated from the following:    Height as of this encounter: 1.727 m (5' 7.99\").    Weight as of this encounter: 142.4 kg (314 lb).  bp completed using cuff size: regular      Health Maintenance that is potentially due pending provider review:    Health Maintenance Due   Topic Date Due     HPV  09/13/2019     PAP  09/13/2019                "
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
## 2019-07-11 NOTE — PATIENT INSTRUCTIONS
Patient Education     Back Care Tips    Caring for your back  These are things you can do to prevent a recurrence of acute back pain and to reduce symptoms from chronic back pain:    Maintain a healthy weight. If you are overweight, losing weight will help most types of back pain.    Exercise is an important part of recovery from most types of back pain. The muscles behind and in front of the spine support the back. This means strengthening both the back muscles and the abdominal muscles will provide better support for your spine.     Swimming and brisk walking are good overall exercises to improve your fitness level.    Practice safe lifting methods (below).    Practice good posture when sitting, standing and walking. Avoid prolonged sitting. This puts more stress on the lower back than standing or walking.    Wear quality shoes with sufficient arch support. Foot and ankle alignment can affect back symptoms. Women should avoid wearing high heels.    Therapeutic massage can help relax the back muscles without stretching them.    During the first 24 to 72 hours after an acute injury or flare-up of chronic back pain, apply an ice pack to the painful area for 20 minutes and then remove it for 20 minutes, over a period of 60 to 90 minutes, or several times a day. As a safety precaution, do not use a heating pad at bedtime. Sleeping on a heating pad can lead to skin burns or tissue damage.    You can alternate ice and heat therapies.  Medicines  Talk to your healthcare provider before using medicines, especially if you have other medical problems or are taking other medicines.    You may use acetaminophen or ibuprofen to control pain, unless your healthcare provider prescribed other pain medicine. If you have chronic conditions like diabetes, liver or kidney disease, stomach ulcers, or gastrointestinal bleeding, or are taking blood thinners, talk with your healthcare provider before taking any medicines.    Be careful  if you are given prescription pain medicines, narcotics, or medicine for muscle spasm. They can cause drowsiness, affect your coordination, reflexes, and judgment. Do not drive or operate heavy machinery while taking these types of medicines. Take prescription pain medicine only as prescribed by your healthcare provider.  Lumbar stretch  Here is a simple stretching exercise that will help relax muscle spasm and keep your back more limber. If exercise makes your back pain worse, don t do it.    Lie on your back with your knees bent and both feet on the ground.    Slowly raise your left knee to your chest as you flatten your lower back against the floor. Hold for 5 seconds.    Relax and repeat the exercise with your right knee.    Do 10 of these exercises for each leg.  Safe lifting method    Don t bend over at the waist to lift an object off the floor.  Instead, bend your knees and hips in a squat.     Keep your back and head upright    Hold the object close to your body, directly in front of you.    Straighten your legs to lift the object.     Lower the object to the floor in the reverse fashion.    If you must slide something across the floor, push it.  Posture tips  Sitting  Sit in chairs with straight backs or low-back support. Keep your knees lower than your hips, with your feet flat on the floor.  When driving, sit up straight. Adjust the seat forward so you are not leaning toward the steering wheel.  A small pillow or rolled towel behind your lower back may help if you are driving long distances.   Standing  When standing for long periods, shift most of your weight to one leg at a time. Alternate legs every few minutes.   Sleeping  The best way to sleep is on your side with your knees bent. Put a low pillow under your head to support your neck in a neutral spine position. Avoid thick pillows that bend your neck to one side. Put a pillow between your legs to further relax your lower back. If you sleep on your  back, put pillows under your knees to support your legs in a slightly flexed position. Use a firm mattress. If your mattress sags, replace it, or use a 1/2-inch plywood board under the mattress to add support.  Follow-up care  Follow up with your healthcare provider, or as advised.  If X-rays, a CT scan or an MRI scan were taken, they will be reviewed by a radiologist. You will be notified of any new findings that may affect your care.  Call 911  Call 911 if any of the following occur:    Trouble breathing    Confusion    Very drowsy    Fainting or loss of consciousness    Rapid or very slow heart rate    Loss of  bowel or bladder control  When to seek medical advice  Call your healthcare provider right away if any of the following occur:    Pain becomes worse or spreads to your arms or legs    Weakness or numbness in one or both arms or legs    Numbness in the groin area  Date Last Reviewed: 6/1/2016 2000-2018 [employer]. [address] [place] [address], [place] [address]. All rights reserved. This information is not intended as a substitute for professional medical care. Always follow your healthcare professional's instructions.

## 2019-07-12 ENCOUNTER — HOSPITAL ENCOUNTER (OUTPATIENT)
Dept: MRI IMAGING | Facility: CLINIC | Age: 44
Discharge: HOME | End: 2019-07-12
Attending: FAMILY MEDICINE | Admitting: FAMILY MEDICINE
Payer: COMMERCIAL

## 2019-07-12 DIAGNOSIS — M54.5 BILATERAL LOW BACK PAIN, UNSPECIFIED CHRONICITY, WITH SCIATICA PRESENCE UNSPECIFIED: ICD-10-CM

## 2019-07-12 PROCEDURE — 72148 MRI LUMBAR SPINE W/O DYE: CPT

## 2019-07-12 NOTE — TELEPHONE ENCOUNTER
RECORDS RECEIVED FROM: Bilateral low back pain, unspecified chronicity, with sciatica // per pt // Dr. Yrn Lopez at  referring // [facility] internal   DATE RECEIVED: Jul 26, 2019    NOTES STATUS DETAILS   OFFICE NOTE from referring provider Internal 7/11/19 Dr. Lopez   OFFICE NOTE from other specialist Internal 5/3/19 PT   DISCHARGE SUMMARY from hospital N/A    DISCHARGE REPORT from the ER N/A    OPERATIVE REPORT N/A    MEDICATION LIST Internal    IMPLANT RECORD/STICKER N/A    LABS     CBC/DIFF N/A    CULTURES N/A    INJECTIONS DONE IN RADIOLOGY N/A    MRI Internal Scheduled for 07/12/19    CT SCAN N/A    XRAYS (IMAGES & REPORTS) N/A    TUMOR     PATHOLOGY  Slides & report N/A

## 2019-07-23 ASSESSMENT — ENCOUNTER SYMPTOMS
PARALYSIS: 0
HALLUCINATIONS: 0
INSOMNIA: 0
ARTHRALGIAS: 1
HEADACHES: 1
SPEECH CHANGE: 0
DEPRESSION: 0
DISTURBANCES IN COORDINATION: 1
EYE WATERING: 0
MYALGIAS: 1
EYE IRRITATION: 0
NIGHT SWEATS: 0
JOINT SWELLING: 1
WEIGHT LOSS: 0
DECREASED CONCENTRATION: 0
FATIGUE: 1
CHILLS: 0
DOUBLE VISION: 0
SEIZURES: 0
FEVER: 0
PANIC: 0
BACK PAIN: 1
MUSCLE CRAMPS: 1
TREMORS: 0
INCREASED ENERGY: 1
WEAKNESS: 1
TINGLING: 1
BRUISES/BLEEDS EASILY: 0
POLYDIPSIA: 0
SWOLLEN GLANDS: 0
EYE PAIN: 0
LOSS OF CONSCIOUSNESS: 0
DECREASED APPETITE: 0
ALTERED TEMPERATURE REGULATION: 0
MUSCLE WEAKNESS: 1
NERVOUS/ANXIOUS: 0
WEIGHT GAIN: 0
POLYPHAGIA: 0
STIFFNESS: 1
MEMORY LOSS: 0
NUMBNESS: 1
DIZZINESS: 1
EYE REDNESS: 0
NECK PAIN: 1

## 2019-07-26 ENCOUNTER — PRE VISIT (OUTPATIENT)
Dept: ORTHOPEDICS | Facility: CLINIC | Age: 44
End: 2019-07-26

## 2019-08-02 DIAGNOSIS — M54.50 LUMBAR PAIN: Primary | ICD-10-CM

## 2019-08-06 ENCOUNTER — ANCILLARY PROCEDURE (OUTPATIENT)
Dept: GENERAL RADIOLOGY | Facility: CLINIC | Age: 44
End: 2019-08-06
Attending: ORTHOPAEDIC SURGERY
Payer: COMMERCIAL

## 2019-08-06 ENCOUNTER — OFFICE VISIT (OUTPATIENT)
Dept: ORTHOPEDICS | Facility: CLINIC | Age: 44
End: 2019-08-06
Payer: COMMERCIAL

## 2019-08-06 VITALS — HEIGHT: 68 IN | BODY MASS INDEX: 44.41 KG/M2 | WEIGHT: 293 LBS

## 2019-08-06 DIAGNOSIS — M54.16 LUMBAR RADICULOPATHY: Primary | ICD-10-CM

## 2019-08-06 ASSESSMENT — MIFFLIN-ST. JEOR: SCORE: 2122.79
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
## 2019-08-13 NOTE — TELEPHONE ENCOUNTER
RECORDS RECEIVED FROM: R hip pain. Referred by Dr Webster. appt per pt.   DATE RECEIVED: 8/13   NOTES STATUS DETAILS   OFFICE NOTE from referring provider Internal    OFFICE NOTE from other specialist Internal    DISCHARGE SUMMARY from hospital n/a    DISCHARGE REPORT from the ER [facility] Everywhere [facility] [place] 9/28/18   OPERATIVE REPORT Care everywhere 9/28/19 [facility]   MEDICATION LIST Internal    LABS     CBC/DIFF N/A    CULTURES N/A    MRI internal    CT SCAN recieved  [facility] 2018   XRAYS (IMAGES & REPORTS) internal    TUMOR     PATHOLOGY  Slides & report N/A    Sent request to [facility] 8/13

## 2019-08-14 ENCOUNTER — THERAPY VISIT (OUTPATIENT)
Dept: PHYSICAL THERAPY | Facility: CLINIC | Age: 44
End: 2019-08-14
Payer: COMMERCIAL

## 2019-08-14 DIAGNOSIS — M54.50 LOW BACK PAIN: ICD-10-CM

## 2019-08-14 DIAGNOSIS — M25.551 HIP PAIN, RIGHT: ICD-10-CM

## 2019-08-14 DIAGNOSIS — M54.16 LUMBAR RADICULOPATHY: ICD-10-CM

## 2019-08-14 PROCEDURE — 97530 THERAPEUTIC ACTIVITIES: CPT | Mod: GP | Performed by: PHYSICAL THERAPIST

## 2019-08-14 PROCEDURE — 97110 THERAPEUTIC EXERCISES: CPT | Mod: GP | Performed by: PHYSICAL THERAPIST

## 2019-08-14 PROCEDURE — 97161 PT EVAL LOW COMPLEX 20 MIN: CPT | Mod: GP | Performed by: PHYSICAL THERAPIST
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
## 2019-08-19 NOTE — PROGRESS NOTES
Outpatient Physical Therapy Discharge Note     Patient: Anne Cortez  : 1975    Beginning/End Dates of Reporting Period:  19 to 5/3/19    Referring Provider: Dr. Lopez    Therapy Diagnosis: general deconditioning and LE weakness    Patient did not return for follow up treatments as directed.  Goal status and current objective information is therefore unknown.  Discharge from PT services at this time for this episode of treatment. Please see attached documentation under this episode of care for further information including dates of service, start of care date, referring physician, Dx, treatment plan, treatments, etc.    Please contact me with any questions or concerns.    Thank you for your referral.    Dinora Sheppard, PT, DPT, CLT  Physical Therapist & Certified Lymphedema Therapist  [facility] - [address] [address] [phone]

## 2019-08-19 NOTE — ADDENDUM NOTE
Encounter addended by: Dinora Sheppard, PT on: 8/19/2019 10:15 AM   Actions taken: Sign clinical note, Episode resolved

## 2019-08-20 ENCOUNTER — THERAPY VISIT (OUTPATIENT)
Dept: PHYSICAL THERAPY | Facility: CLINIC | Age: 44
End: 2019-08-20
Payer: COMMERCIAL

## 2019-08-20 DIAGNOSIS — M25.551 HIP PAIN, RIGHT: ICD-10-CM

## 2019-08-20 PROCEDURE — 97530 THERAPEUTIC ACTIVITIES: CPT | Mod: GP | Performed by: PHYSICAL THERAPIST

## 2019-08-20 PROCEDURE — 97110 THERAPEUTIC EXERCISES: CPT | Mod: GP | Performed by: PHYSICAL THERAPIST

## 2019-08-22 DIAGNOSIS — I82.5Y9 CHRONIC DEEP VEIN THROMBOSIS (DVT) OF PROXIMAL VEIN OF LOWER EXTREMITY, UNSPECIFIED LATERALITY (H): ICD-10-CM

## 2019-08-22 NOTE — TELEPHONE ENCOUNTER
Requested Prescriptions   Pending Prescriptions Disp Refills     rivaroxaban ANTICOAGULANT (XARELTO) 20 MG TABS tablet 90 tablet 0     Sig: Take 1 tablet (20 mg) by mouth daily (with dinner)       Direct Oral Anticoagulant Agents Failed - 8/22/2019  9:27 AM        Failed - Creatinine Clearance greater than 50 ml/min on file in past 3 mos     No lab results found.          Failed - Serum creatinine less than or equal to 1.4 on file in past 3 mos     Recent Labs   Lab Test 03/21/17  1642   CR 0.69             Passed - Normal Platelets on file in past 12 months     Recent Labs   Lab Test 10/15/18  1020                  Passed - Medication is active on med list        Passed - Patient is 18 years of age or older        Passed - No active pregnancy on record        Passed - No positive pregnancy test within past 12 months        Passed - Recent (6 mo) or future (30 days) visit within the authorizing provider's specialty        Last Written Prescription Date:  5/24/19  Last Fill Quantity: 90,  # refills: 0   Last office visit: 7/11/2019 with prescribing provider:     Future Office Visit:

## 2019-08-22 NOTE — TELEPHONE ENCOUNTER
Routing refill request to provider for review/approval because:  Labs not current:  See below    Kimberly MCCABE RN

## 2019-09-04 ENCOUNTER — THERAPY VISIT (OUTPATIENT)
Dept: PHYSICAL THERAPY | Facility: CLINIC | Age: 44
End: 2019-09-04
Payer: COMMERCIAL

## 2019-09-04 DIAGNOSIS — M25.551 HIP PAIN, RIGHT: ICD-10-CM

## 2019-09-04 PROCEDURE — 97110 THERAPEUTIC EXERCISES: CPT | Mod: GP | Performed by: PHYSICAL THERAPIST

## 2019-09-11 ENCOUNTER — THERAPY VISIT (OUTPATIENT)
Dept: PHYSICAL THERAPY | Facility: CLINIC | Age: 44
End: 2019-09-11
Payer: COMMERCIAL

## 2019-09-11 ENCOUNTER — PRE VISIT (OUTPATIENT)
Dept: ORTHOPEDICS | Facility: CLINIC | Age: 44
End: 2019-09-11

## 2019-09-11 DIAGNOSIS — M25.551 HIP PAIN, RIGHT: ICD-10-CM

## 2019-09-11 PROCEDURE — 97110 THERAPEUTIC EXERCISES: CPT | Mod: GP | Performed by: PHYSICAL THERAPIST

## 2019-09-19 ENCOUNTER — THERAPY VISIT (OUTPATIENT)
Dept: PHYSICAL THERAPY | Facility: CLINIC | Age: 44
End: 2019-09-19
Payer: COMMERCIAL

## 2019-09-19 DIAGNOSIS — M25.551 HIP PAIN, RIGHT: ICD-10-CM

## 2019-09-19 DIAGNOSIS — Z12.31 VISIT FOR SCREENING MAMMOGRAM: ICD-10-CM

## 2019-09-19 PROCEDURE — 77063 BREAST TOMOSYNTHESIS BI: CPT | Mod: TC

## 2019-09-19 PROCEDURE — 77067 SCR MAMMO BI INCL CAD: CPT | Mod: TC

## 2019-09-19 PROCEDURE — 97110 THERAPEUTIC EXERCISES: CPT | Mod: GP | Performed by: PHYSICAL THERAPIST

## 2019-09-30 ENCOUNTER — OFFICE VISIT (OUTPATIENT)
Dept: OBGYN | Facility: CLINIC | Age: 44
End: 2019-09-30
Payer: COMMERCIAL

## 2019-09-30 VITALS
WEIGHT: 293 LBS | DIASTOLIC BLOOD PRESSURE: 71 MMHG | BODY MASS INDEX: 48.2 KG/M2 | SYSTOLIC BLOOD PRESSURE: 133 MMHG | HEART RATE: 71 BPM | TEMPERATURE: 97.6 F

## 2019-09-30 DIAGNOSIS — Z00.00 ROUTINE GENERAL MEDICAL EXAMINATION AT A HEALTH CARE FACILITY: ICD-10-CM

## 2019-09-30 DIAGNOSIS — Z23 NEED FOR PROPHYLACTIC VACCINATION AND INOCULATION AGAINST INFLUENZA: ICD-10-CM

## 2019-09-30 PROCEDURE — 90471 IMMUNIZATION ADMIN: CPT | Performed by: OBSTETRICS & GYNECOLOGY

## 2019-09-30 PROCEDURE — 87624 HPV HI-RISK TYP POOLED RSLT: CPT | Performed by: OBSTETRICS & GYNECOLOGY

## 2019-09-30 PROCEDURE — 88175 CYTOPATH C/V AUTO FLUID REDO: CPT | Performed by: OBSTETRICS & GYNECOLOGY

## 2019-09-30 PROCEDURE — 90686 IIV4 VACC NO PRSV 0.5 ML IM: CPT | Performed by: OBSTETRICS & GYNECOLOGY

## 2019-09-30 PROCEDURE — 99396 PREV VISIT EST AGE 40-64: CPT | Mod: 25 | Performed by: OBSTETRICS & GYNECOLOGY
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
## 2019-09-30 NOTE — NURSING NOTE
"Initial /71 (BP Location: Right arm, Patient Position: Chair, Cuff Size: Adult Large)   Pulse 71   Temp 97.6  F (36.4  C) (Oral)   Wt 143.8 kg (317 lb)   Breastfeeding? No   BMI 48.20 kg/m   Estimated body mass index is 48.2 kg/m  as calculated from the following:    Height as of 8/6/19: 1.727 m (5' 8\").    Weight as of this encounter: 143.8 kg (317 lb). .    Leslie Browne MA    "

## 2019-10-03 LAB
COPATH REPORT: NORMAL
PAP: NORMAL

## 2019-10-10 ENCOUNTER — THERAPY VISIT (OUTPATIENT)
Dept: PHYSICAL THERAPY | Facility: CLINIC | Age: 44
End: 2019-10-10
Payer: COMMERCIAL

## 2019-10-10 DIAGNOSIS — M25.551 HIP PAIN, RIGHT: ICD-10-CM

## 2019-10-10 PROCEDURE — 97110 THERAPEUTIC EXERCISES: CPT | Mod: GP | Performed by: PHYSICAL THERAPIST

## 2019-10-11 ENCOUNTER — E-VISIT (OUTPATIENT)
Dept: FAMILY MEDICINE | Facility: CLINIC | Age: 44
End: 2019-10-11
Payer: COMMERCIAL

## 2019-10-11 DIAGNOSIS — J01.90 ACUTE SINUSITIS WITH SYMPTOMS > 10 DAYS: Primary | ICD-10-CM

## 2019-10-11 PROCEDURE — 99444 ZZC PHYSICIAN ONLINE EVALUATION & MANAGEMENT SERVICE: CPT | Performed by: FAMILY MEDICINE

## 2019-10-30 ENCOUNTER — THERAPY VISIT (OUTPATIENT)
Dept: PHYSICAL THERAPY | Facility: CLINIC | Age: 44
End: 2019-10-30
Payer: COMMERCIAL

## 2019-10-30 DIAGNOSIS — M25.551 HIP PAIN, RIGHT: ICD-10-CM

## 2019-10-30 PROCEDURE — 97530 THERAPEUTIC ACTIVITIES: CPT | Mod: GP | Performed by: PHYSICAL THERAPIST

## 2019-10-30 PROCEDURE — 97140 MANUAL THERAPY 1/> REGIONS: CPT | Mod: GP | Performed by: PHYSICAL THERAPIST

## 2019-10-30 PROCEDURE — 97110 THERAPEUTIC EXERCISES: CPT | Mod: GP | Performed by: PHYSICAL THERAPIST

## 2019-11-07 ENCOUNTER — HEALTH MAINTENANCE LETTER (OUTPATIENT)
Age: 44
End: 2019-11-07

## 2019-11-13 ENCOUNTER — THERAPY VISIT (OUTPATIENT)
Dept: PHYSICAL THERAPY | Facility: CLINIC | Age: 44
End: 2019-11-13
Payer: COMMERCIAL

## 2019-11-13 DIAGNOSIS — M25.551 HIP PAIN, RIGHT: ICD-10-CM

## 2019-11-13 PROCEDURE — 97140 MANUAL THERAPY 1/> REGIONS: CPT | Mod: GP | Performed by: PHYSICAL THERAPIST

## 2019-11-13 PROCEDURE — 97110 THERAPEUTIC EXERCISES: CPT | Mod: GP | Performed by: PHYSICAL THERAPIST

## 2019-11-22 DIAGNOSIS — I82.5Y9 CHRONIC DEEP VEIN THROMBOSIS (DVT) OF PROXIMAL VEIN OF LOWER EXTREMITY, UNSPECIFIED LATERALITY (H): ICD-10-CM

## 2019-11-22 NOTE — TELEPHONE ENCOUNTER
Requested Prescriptions   Pending Prescriptions Disp Refills     rivaroxaban ANTICOAGULANT (XARELTO) 20 MG TABS tablet 90 tablet 0     Sig: Take 1 tablet (20 mg) by mouth daily (with dinner)       Direct Oral Anticoagulant Agents Failed - 11/22/2019  1:06 PM        Failed - Normal Platelets on file in past 12 months     Recent Labs   Lab Test 10/15/18  1020                  Failed - Creatinine Clearance greater than 50 ml/min on file in past 3 mos     No lab results found.          Failed - Serum creatinine less than or equal to 1.4 on file in past 3 mos     Recent Labs   Lab Test 03/21/17  1642   CR 0.69             Passed - Medication is active on med list        Passed - Patient is 18 years of age or older        Passed - No active pregnancy on record        Passed - No positive pregnancy test within past 12 months        Passed - Recent (6 mo) or future (30 days) visit within the authorizing provider's specialty      rivaroxaban ANTICOAGULANT (XARELTO) 20 MG TABS tablet  Last Written Prescription Date:  08/22/2019  Last Fill Quantity: 90 tablet,  # refills: 0   Last office visit: 7/11/2019 with prescribing provider:  WING Lopez   Future Office Visit:      Selin ZAPATA (R) (M)

## 2019-11-27 ENCOUNTER — THERAPY VISIT (OUTPATIENT)
Dept: PHYSICAL THERAPY | Facility: CLINIC | Age: 44
End: 2019-11-27
Payer: COMMERCIAL

## 2019-11-27 DIAGNOSIS — M25.551 HIP PAIN, RIGHT: ICD-10-CM

## 2019-11-27 PROCEDURE — 97110 THERAPEUTIC EXERCISES: CPT | Mod: GP | Performed by: PHYSICAL THERAPIST

## 2019-11-27 PROCEDURE — 97140 MANUAL THERAPY 1/> REGIONS: CPT | Mod: GP | Performed by: PHYSICAL THERAPIST

## 2019-12-11 ENCOUNTER — THERAPY VISIT (OUTPATIENT)
Dept: PHYSICAL THERAPY | Facility: CLINIC | Age: 44
End: 2019-12-11
Payer: COMMERCIAL

## 2019-12-11 DIAGNOSIS — M25.551 HIP PAIN, RIGHT: ICD-10-CM

## 2019-12-11 PROCEDURE — 97140 MANUAL THERAPY 1/> REGIONS: CPT | Mod: GP | Performed by: PHYSICAL THERAPIST

## 2019-12-11 PROCEDURE — 97530 THERAPEUTIC ACTIVITIES: CPT | Mod: GP | Performed by: PHYSICAL THERAPIST

## 2019-12-11 ASSESSMENT — ACTIVITIES OF DAILY LIVING (ADL)
DEEP_SQUATTING: MODERATE DIFFICULTY
WALKING_INITIALLY: NO DIFFICULTY AT ALL
STEPPING_UP_AND_DOWN_CURBS: NO DIFFICULTY AT ALL
HOW_WOULD_YOU_RATE_YOUR_CURRENT_LEVEL_OF_FUNCTION_DURING_YOUR_USUAL_ACTIVITIES_OF_DAILY_LIVING_FROM_0_TO_100_WITH_100_BEING_YOUR_LEVEL_OF_FUNCTION_PRIOR_TO_YOUR_HIP_PROBLEM_AND_0_BEING_THE_INABILITY_TO_PERFORM_ANY_OF_YOUR_USUAL_DAILY_ACTIVITIES?: 75
STANDING_FOR_15_MINUTES: SLIGHT DIFFICULTY
WALKING_DOWN_STEEP_HILLS: SLIGHT DIFFICULTY
HOS_ADL_COUNT: 16
WALKING_UP_STEEP_HILLS: SLIGHT DIFFICULTY
HEAVY_WORK: SLIGHT DIFFICULTY
WALKING_APPROXIMATELY_10_MINUTES: NO DIFFICULTY AT ALL
PUTTING_ON_SOCKS_AND_SHOES: SLIGHT DIFFICULTY
ROLLING_OVER_IN_BED: MODERATE DIFFICULTY
LIGHT_TO_MODERATE_WORK: NO DIFFICULTY AT ALL
TWISTING/PIVOTING_ON_INVOLVED_LEG: SLIGHT DIFFICULTY
SITTING_FOR_15_MINUTES: NO DIFFICULTY AT ALL
GOING_UP_1_FLIGHT_OF_STAIRS: SLIGHT DIFFICULTY
HOS_ADL_SCORE(%): 78.12
GOING_DOWN_1_FLIGHT_OF_STAIRS: SLIGHT DIFFICULTY
HOS_ADL_ITEM_SCORE_TOTAL: 50
WALKING_15_MINUTES_OR_GREATER: SLIGHT DIFFICULTY
RECREATIONAL_ACTIVITIES: SLIGHT DIFFICULTY
HOS_ADL_HIGHEST_POTENTIAL_SCORE: 64
GETTING_INTO_AND_OUT_OF_AN_AVERAGE_CAR: SLIGHT DIFFICULTY

## 2020-02-17 DIAGNOSIS — I82.5Y9 CHRONIC DEEP VEIN THROMBOSIS (DVT) OF PROXIMAL VEIN OF LOWER EXTREMITY, UNSPECIFIED LATERALITY (H): ICD-10-CM

## 2020-02-17 NOTE — TELEPHONE ENCOUNTER
Requested Prescriptions   Pending Prescriptions Disp Refills     rivaroxaban ANTICOAGULANT (XARELTO) 20 MG TABS tablet 90 tablet 0     Sig: Take 1 tablet (20 mg) by mouth daily (with dinner)       Direct Oral Anticoagulant Agents Failed - 2/17/2020  3:47 PM        Failed - Normal Platelets on file in past 12 months     Recent Labs   Lab Test 10/15/18  1020                  Failed - Creatinine Clearance greater than 50 ml/min on file in past 3 mos     No lab results found.          Failed - Serum creatinine less than or equal to 1.4 on file in past 3 mos     Recent Labs   Lab Test 03/21/17  1642   CR 0.69             Failed - Recent (6 mo) or future (30 days) visit within the authorizing provider's specialty        Passed - Medication is active on med list        Passed - Patient is 18 years of age or older        Passed - No active pregnancy on record        Passed - No positive pregnancy test within past 12 months      rivaroxaban ANTICOAGULANT (XARELTO) 20 MG TABS tablet  Last Written Prescription Date:  11/23/2019  Last Fill Quantity: 90 tablet,  # refills: 0   Last office visit: 7/11/2019 with prescribing provider:  WING Lopez   Future Office Visit:      Selin ZAPATA (R) (M)

## 2020-02-18 NOTE — TELEPHONE ENCOUNTER
Routing refill request to provider for review/approval because:  Labs not current:  See below  Last OV with Dr. Lopez (prescriber) was 7/11/19    Kimberly MCCABE RN

## 2020-03-10 PROBLEM — M25.551 HIP PAIN, RIGHT: Status: RESOLVED | Noted: 2019-08-14 | Resolved: 2020-03-10

## 2020-03-10 NOTE — PROGRESS NOTES
Discharge Note    Progress reporting period is from initial eval to Dec 11, 2019.     Anne failed to return for next follow up visit and current status is unknown.  Please see information below for last relevant information on current status.  Patient seen for 10 visits.    SUBJECTIVE  Subjective changes noted by patient:  Pt doing about the same as last session. Only notes mild tightness in L posterior rib cage still. Has been stretching and exercising area frequently and feels it helps. Feels she can manage her sxs really well lately. Overall feels over 90% better than when she started. Feels ok with today being last session. Walking still challenging, feels she gets mostly tired vs pain when walking for long periods of time  .  Current pain level is 1/10.     Previous pain level was  5/10.   Changes in function:  Yes (See Goal flowsheet attached for changes in current functional level)  Adverse reaction to treatment or activity: None    OBJECTIVE  Changes noted in objective findings: Slight pain noted with palpation to L posterolateral rib cage, improved pain after STM     ASSESSMENT/PLAN  Diagnosis: LBP   DIAGP:  The encounter diagnosis was Hip pain, right.  STG/LTGs have been met or progress has been made towards goals:  Yes, please see goal flowsheet for most current information  Assessment of Progress: current status is unknown.    Last current status: Pt is progressing as expected   Self Management Plans:  HEP  I have re-evaluated this patient and find that the nature, scope, duration and intensity of the therapy is appropriate for the medical condition of the patient.  Anne continues to require the following intervention to meet STG and LTG's:  HEP.    Recommendations:  Discharge with current home program.  Patient to follow up with MD as needed.    Please refer to the daily flowsheet for treatment today, total treatment time and time spent performing 1:1 timed codes.

## 2020-05-20 DIAGNOSIS — I82.5Y9 CHRONIC DEEP VEIN THROMBOSIS (DVT) OF PROXIMAL VEIN OF LOWER EXTREMITY, UNSPECIFIED LATERALITY (H): Primary | ICD-10-CM

## 2020-05-20 NOTE — TELEPHONE ENCOUNTER
Last OV with Dr Lopez on 7-11-19, pt requesting a refill on Xarelto.  See last platelet result below.  Platelet Count   Date Value Ref Range Status   10/15/2018 301 150 - 450 10e9/L Final     Jeanette Brown RN

## 2020-05-21 NOTE — TELEPHONE ENCOUNTER
From Bishnu Parker PA-C: Please call patient. Refilled for 3 months but please come in for lab work.     Kimberly MCCABE RN, BSN

## 2020-05-21 NOTE — TELEPHONE ENCOUNTER
I left a message for the patient to return call to clinic.  CSS please deliver message below (she does not have to be fasting for labs).    Kimberly MCCABE RN, BSN

## 2020-05-26 ENCOUNTER — MYC MEDICAL ADVICE (OUTPATIENT)
Dept: FAMILY MEDICINE | Facility: CLINIC | Age: 45
End: 2020-05-26

## 2020-05-26 ENCOUNTER — VIRTUAL VISIT (OUTPATIENT)
Dept: FAMILY MEDICINE | Facility: CLINIC | Age: 45
End: 2020-05-26
Payer: COMMERCIAL

## 2020-05-26 DIAGNOSIS — R21 RASH AND NONSPECIFIC SKIN ERUPTION: Primary | ICD-10-CM

## 2020-05-26 PROCEDURE — 99213 OFFICE O/P EST LOW 20 MIN: CPT | Mod: GT | Performed by: FAMILY MEDICINE

## 2020-05-26 RX ORDER — LORATADINE 10 MG/1
10 TABLET ORAL DAILY
COMMUNITY
End: 2020-09-01

## 2020-05-26 RX ORDER — PREDNISONE 10 MG/1
30 TABLET ORAL DAILY
Qty: 21 TABLET | Refills: 0 | Status: SHIPPED | OUTPATIENT
Start: 2020-05-26 | End: 2020-06-10
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
## 2020-06-09 ENCOUNTER — MYC MEDICAL ADVICE (OUTPATIENT)
Dept: FAMILY MEDICINE | Facility: CLINIC | Age: 45
End: 2020-06-09

## 2020-06-10 ENCOUNTER — OFFICE VISIT (OUTPATIENT)
Dept: FAMILY MEDICINE | Facility: CLINIC | Age: 45
End: 2020-06-10
Payer: COMMERCIAL

## 2020-06-10 VITALS
OXYGEN SATURATION: 96 % | SYSTOLIC BLOOD PRESSURE: 104 MMHG | TEMPERATURE: 98.4 F | WEIGHT: 293 LBS | HEART RATE: 83 BPM | DIASTOLIC BLOOD PRESSURE: 78 MMHG | BODY MASS INDEX: 50.42 KG/M2 | RESPIRATION RATE: 18 BRPM

## 2020-06-10 DIAGNOSIS — R21 RASH AND NONSPECIFIC SKIN ERUPTION: Primary | ICD-10-CM

## 2020-06-10 DIAGNOSIS — D68.52 PROTHROMBIN GENE MUTATION (H): ICD-10-CM

## 2020-06-10 DIAGNOSIS — Z79.01 LONG TERM (CURRENT) USE OF ANTICOAGULANTS: ICD-10-CM

## 2020-06-10 DIAGNOSIS — Z86.718 HISTORY OF DEEP VENOUS THROMBOSIS: ICD-10-CM

## 2020-06-10 LAB
ALBUMIN SERPL-MCNC: 3.4 G/DL (ref 3.4–5)
ALP SERPL-CCNC: 66 U/L (ref 40–150)
ALT SERPL W P-5'-P-CCNC: 26 U/L (ref 0–50)
ANION GAP SERPL CALCULATED.3IONS-SCNC: 5 MMOL/L (ref 3–14)
AST SERPL W P-5'-P-CCNC: 12 U/L (ref 0–45)
BILIRUB SERPL-MCNC: 0.8 MG/DL (ref 0.2–1.3)
BUN SERPL-MCNC: 15 MG/DL (ref 7–30)
CALCIUM SERPL-MCNC: 8.9 MG/DL (ref 8.5–10.1)
CHLORIDE SERPL-SCNC: 106 MMOL/L (ref 94–109)
CO2 SERPL-SCNC: 28 MMOL/L (ref 20–32)
CREAT SERPL-MCNC: 0.87 MG/DL (ref 0.52–1.04)
ERYTHROCYTE [DISTWIDTH] IN BLOOD BY AUTOMATED COUNT: 12.8 % (ref 10–15)
ERYTHROCYTE [SEDIMENTATION RATE] IN BLOOD BY WESTERGREN METHOD: 6 MM/H (ref 0–20)
GFR SERPL CREATININE-BSD FRML MDRD: 81 ML/MIN/{1.73_M2}
GLUCOSE SERPL-MCNC: 124 MG/DL (ref 70–99)
HCT VFR BLD AUTO: 42.5 % (ref 35–47)
HGB BLD-MCNC: 14.2 G/DL (ref 11.7–15.7)
MCH RBC QN AUTO: 29.2 PG (ref 26.5–33)
MCHC RBC AUTO-ENTMCNC: 33.4 G/DL (ref 31.5–36.5)
MCV RBC AUTO: 87 FL (ref 78–100)
PLATELET # BLD AUTO: 147 10E9/L (ref 150–450)
POTASSIUM SERPL-SCNC: 3.9 MMOL/L (ref 3.4–5.3)
PROT SERPL-MCNC: 6.8 G/DL (ref 6.8–8.8)
RBC # BLD AUTO: 4.87 10E12/L (ref 3.8–5.2)
SODIUM SERPL-SCNC: 139 MMOL/L (ref 133–144)
WBC # BLD AUTO: 8.4 10E9/L (ref 4–11)

## 2020-06-10 PROCEDURE — 85652 RBC SED RATE AUTOMATED: CPT | Performed by: FAMILY MEDICINE

## 2020-06-10 PROCEDURE — 36415 COLL VENOUS BLD VENIPUNCTURE: CPT | Performed by: FAMILY MEDICINE

## 2020-06-10 PROCEDURE — 80053 COMPREHEN METABOLIC PANEL: CPT | Performed by: FAMILY MEDICINE

## 2020-06-10 PROCEDURE — 99214 OFFICE O/P EST MOD 30 MIN: CPT | Performed by: FAMILY MEDICINE

## 2020-06-10 PROCEDURE — 85027 COMPLETE CBC AUTOMATED: CPT | Performed by: FAMILY MEDICINE

## 2020-06-10 RX ORDER — MENTHOL 0 G/G
POWDER TOPICAL
COMMUNITY
End: 2020-09-01

## 2020-06-10 RX ORDER — DIPHENHYDRAMINE HCL 25 MG
25 CAPSULE ORAL EVERY 6 HOURS PRN
COMMUNITY
End: 2020-09-01

## 2020-06-10 RX ORDER — BENZOCAINE/MENTHOL 6 MG-10 MG
LOZENGE MUCOUS MEMBRANE 2 TIMES DAILY
COMMUNITY
End: 2020-09-01
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
## 2020-06-10 NOTE — PATIENT INSTRUCTIONS
Patient Education     Sunburn  A sunburn is an injury to the skin. It is caused by over-exposure to ultraviolet (UV) light from the sun. The skin becomes pink or red and painful. Very severe sunburns may cause blistering and fluid draining from the skin. Open blisters may become infected. Watch for signs of infection. These include worsening pain, redness, swelling, or pus draining from the burn.  Sunburn starts to get better after 1 to 2 days. A few days later the skin begins to peel. It may take up to 3 weeks to fully heal. This depends on how severe the burn is.  Home care  The following guidelines will help you care for your sunburn at home:    Place an ice pack over the injured area. Do this for 20 minutes every 1 to 2 hours the first day for pain relief. To make an ice pack, put ice cubes in a plastic bag that seals at the top. Wrap the bag in a clean, thin towel or cloth. Never put ice or an ice pack directly on your skin. This can cause damage. You can keep using an ice pack at least 3 to 4 times a day until the pain goes away. Cool baths and showers will also help with pain relief.    If you have blisters, don't break them. Open blisters slow the healing process. They also raise your risk of infection. You can cover the open blisters with antibacterial cream or ointment, and a dressing or bandage.    Wash the burned area daily with soap and water. Then gently dry it with a clean towel. If a dressing was used, put a clean one back on until any open blisters dry up. If the bandage sticks, soak it off in warm water. You can also use nonstick dressings to help prevent this from happening.    Drink plenty of fluids to avoid fluid loss (dehydration).  Medicine    You can take over-the-counter medicine for pain, unless you were given a different pain medicine to use. Talk with your provider before using these medicines if you have chronic liver or kidney disease, ever had a stomach ulcer or GI bleeding, or are  taking blood thinner medicines. Don't give ibuprofen to children younger than 6 months.    Over-the-counter first-aid creams and sprays made with lidocaine or benzocaine can also help with pain. However, some people are sensitive to medicines that have these ingredients. If the redness or itching gets worse, stop using these medicines. You can use aloe or a moisturizing cream with aloe, or hydrocortisone cream (sold over the counter) to help with pain and swelling. Use these only over spots that don t have broken blisters.    Antibiotics are usually not given unless there is an infection. If you were prescribed antibiotics, take them until they are finished. It is important to finish the antibiotics even if the burn looks better. This will ensure the infection has cleared.  Prevention  Sun exposure damages the DNA of skin cells. This can lead to premature skin aging and wrinkles. Sun exposure is the main cause of skin cancer. Protect your skin from the sun by following these tips:    Limit your exposure to UV light. The sun is strongest from 10 a.m. to 4 p.m. If possible, arrange your sun exposure to be before or after those hours. The sun is more intense at the beach, where light reflects off the sand and water. The sun is also more intense at higher altitudes, especially where there is reflecting snow. You can even get sunburn on a cloudy day, because UV light passes through clouds.    Don't use tanning beds.    Wear protective clothing and a hat. Clothing is more effective than sunscreen alone in blocking UV light.    Stay in the shade or carry an umbrella.    Use sunscreen on your skin. Put sunscreen on 15 to 20 minutes before going out in the sun. This gives the sunscreen time to interact with your skin. Reapply sunscreen every 1 to 2 hours. Reapply it sooner if it is washed away by sweat or water. Use a sunscreen rated at SPF 30 or higher.    Wear sunglasses to protect your eyes from UV exposure.    Many  medicines can increase your sensitivity to the sun. These include heart, nausea, anti-inflammatory, and diabetic medicines. It also includes antibiotics and diuretics. Check medicine fact sheets. Talk with your provider if you have questions about your increased risk of sun sensitivity. Sunscreens may not prevent this.   Follow-up care  Sunburn usually heals without any problems. Follow up with your provider if your sunburn is not healing with home treatments. Also see your provider if you have signs and symptoms of infection.  When to seek medical advice  Call your healthcare provider right away if any of these occur:    Pain that gets worse    Increasing redness, or red streaks leading away from an open blister    Swelling or pus coming from open blisters    Upset stomach (nausea)    Fever of 100.4  F (38  C) or higher, or as directed by your healthcare provider  Call 911  Call 911 if you have:    Dizziness, fainting, or weakness.  Date Last Reviewed: 8/1/2016 2000-2019 [employer]. [address] [place] [address], [place] [address]. All rights reserved. This information is not intended as a substitute for professional medical care. Always follow your healthcare professional's instructions.

## 2020-06-16 ENCOUNTER — OFFICE VISIT (OUTPATIENT)
Dept: DERMATOLOGY | Facility: CLINIC | Age: 45
End: 2020-06-16
Attending: FAMILY MEDICINE
Payer: COMMERCIAL

## 2020-06-16 VITALS — OXYGEN SATURATION: 98 % | HEART RATE: 96 BPM

## 2020-06-16 DIAGNOSIS — L50.3 DERMATOGRAPHIA: Primary | ICD-10-CM

## 2020-06-16 PROCEDURE — 99214 OFFICE O/P EST MOD 30 MIN: CPT | Performed by: DERMATOLOGY

## 2020-06-16 RX ORDER — MONTELUKAST SODIUM 10 MG/1
10 TABLET ORAL AT BEDTIME
Qty: 30 TABLET | Refills: 3 | Status: SHIPPED | OUTPATIENT
Start: 2020-06-16 | End: 2020-09-01
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
## 2020-06-16 NOTE — PATIENT INSTRUCTIONS
-2 Claritin in the morning  -2 Zyrtec at bedtime  -1 Singulair daily    Proper skin care from Dr. Stockton- [facility]     Eliminate harsh soaps, i.e. Dial, Zest, [place] Spring;   Use mild soaps such as Cetaphil or Dove Sensitive Skin   Avoid hot or cold showers   After showering, lightly dry off.    Aggressive use of a moisturizer (including Vanicream, Cetaphil, Aquaphor or Cerave)   We recommend using a tub that needs to be scooped out, not a pump. This has more of an oil base. It will hold moisture in your skin much better than a water base moisturizer. The ones recommended are non- pore clogging.       If you have any questions call [phone] and follow the prompts to Dr. Stockton's office.
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
## 2020-07-01 ENCOUNTER — OFFICE VISIT (OUTPATIENT)
Dept: DERMATOLOGY | Facility: CLINIC | Age: 45
End: 2020-07-01
Payer: COMMERCIAL

## 2020-07-01 VITALS — OXYGEN SATURATION: 97 % | HEART RATE: 86 BPM | SYSTOLIC BLOOD PRESSURE: 141 MMHG | DIASTOLIC BLOOD PRESSURE: 86 MMHG

## 2020-07-01 DIAGNOSIS — L50.3 DERMATOGRAPHIA: Primary | ICD-10-CM

## 2020-07-01 PROCEDURE — 99212 OFFICE O/P EST SF 10 MIN: CPT | Performed by: DERMATOLOGY

## 2020-07-01 RX ORDER — CETIRIZINE HYDROCHLORIDE 10 MG/1
10 TABLET ORAL DAILY
COMMUNITY
End: 2020-09-01

## 2020-07-01 NOTE — PATIENT INSTRUCTIONS
For the first 2 weeks take 1 Claritin in the morning and 2 Zyrtec at night    The next 2 weeks take 1 Claritin in the morning and 1 Zyrtec at night    The next 2 weeks take just the Zyrtec at night    Then see how well you do being off the antihistamines.     If there are any flare ups you can MyChart Dr. Stockton for instructions.
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
## 2020-07-01 NOTE — NURSING NOTE
"Initial BP (!) 141/86 (BP Location: Left arm, Patient Position: Sitting, Cuff Size: Adult Large)   Pulse 86   SpO2 97%  Estimated body mass index is 50.42 kg/m  as calculated from the following:    Height as of 8/6/19: 1.727 m (5' 8\").    Weight as of 6/10/20: 150.4 kg (331 lb 9.6 oz). .      "
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
## 2020-07-15 ENCOUNTER — MYC MEDICAL ADVICE (OUTPATIENT)
Dept: FAMILY MEDICINE | Facility: CLINIC | Age: 45
End: 2020-07-15

## 2020-08-18 DIAGNOSIS — I82.5Y9 CHRONIC DEEP VEIN THROMBOSIS (DVT) OF PROXIMAL VEIN OF LOWER EXTREMITY, UNSPECIFIED LATERALITY (H): ICD-10-CM

## 2020-08-19 NOTE — TELEPHONE ENCOUNTER
Requested Prescriptions   Pending Prescriptions Disp Refills     rivaroxaban ANTICOAGULANT (XARELTO) 20 MG TABS tablet 90 tablet 0     Sig: Take 1 tablet (20 mg) by mouth daily (with dinner)       Direct Oral Anticoagulant Agents Failed - 8/18/2020  2:19 PM        Failed - Normal Platelets on file in past 12 months     Recent Labs   Lab Test 06/10/20  0834   *               Failed - Creatinine Clearance greater than 50 ml/min on file in past 3 mos     No lab results found.          Passed - Medication is active on med list        Passed - Serum creatinine less than or equal to 1.4 on file in past 3 mos     Recent Labs   Lab Test 06/10/20  0834   CR 0.87       Ok to refill medication if creatinine is low          Passed - Patient is 18 years of age or older        Passed - No active pregnancy on record        Passed - No positive pregnancy test within past 12 months        Passed - Recent (6 mo) or future (30 days) visit within the authorizing provider's specialty

## 2020-08-20 ENCOUNTER — OFFICE VISIT (OUTPATIENT)
Dept: OBGYN | Facility: CLINIC | Age: 45
End: 2020-08-20
Payer: COMMERCIAL

## 2020-08-20 VITALS
DIASTOLIC BLOOD PRESSURE: 78 MMHG | SYSTOLIC BLOOD PRESSURE: 124 MMHG | WEIGHT: 293 LBS | HEART RATE: 80 BPM | BODY MASS INDEX: 50.04 KG/M2

## 2020-08-20 DIAGNOSIS — Z30.430 ENCOUNTER FOR IUD INSERTION: ICD-10-CM

## 2020-08-20 DIAGNOSIS — Z01.419 ENCOUNTER FOR GYNECOLOGICAL EXAMINATION WITHOUT ABNORMAL FINDING: ICD-10-CM

## 2020-08-20 DIAGNOSIS — Z01.812 PRE-PROCEDURE LAB EXAM: Primary | ICD-10-CM

## 2020-08-20 DIAGNOSIS — Z30.433 ENCOUNTER FOR REMOVAL AND REINSERTION OF INTRAUTERINE CONTRACEPTIVE DEVICE: ICD-10-CM

## 2020-08-20 LAB — HCG, QUAL URINE: NORMAL

## 2020-08-20 PROCEDURE — 99396 PREV VISIT EST AGE 40-64: CPT | Mod: 25 | Performed by: OBSTETRICS & GYNECOLOGY

## 2020-08-20 PROCEDURE — 58301 REMOVE INTRAUTERINE DEVICE: CPT | Performed by: OBSTETRICS & GYNECOLOGY

## 2020-08-20 PROCEDURE — 84703 CHORIONIC GONADOTROPIN ASSAY: CPT | Performed by: OBSTETRICS & GYNECOLOGY

## 2020-08-20 PROCEDURE — 58300 INSERT INTRAUTERINE DEVICE: CPT | Performed by: OBSTETRICS & GYNECOLOGY

## 2020-08-20 NOTE — NURSING NOTE
"Chief Complaint   Patient presents with     Physical     Pap 2019 NIL HPV NEG  Mammo due       IUD     Mirena remove and replace  Inserted 2015        Initial /78 (BP Location: Right arm, Patient Position: Chair, Cuff Size: Adult Large)   Pulse 80   Wt 149.3 kg (329 lb 1.6 oz)   LMP  (LMP Unknown)   Breastfeeding No   BMI 50.04 kg/m   Estimated body mass index is 50.04 kg/m  as calculated from the following:    Height as of 19: 1.727 m (5' 8\").    Weight as of this encounter: 149.3 kg (329 lb 1.6 oz).  BP completed using cuff size: large    Questioned patient about current smoking habits.  Pt. has never smoked.          The following HM Due: mammogram scheduled      Rachel De La Cruz CMA on 2020 at 9:40 AM             "
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
## 2020-09-01 ENCOUNTER — OFFICE VISIT (OUTPATIENT)
Dept: DERMATOLOGY | Facility: CLINIC | Age: 45
End: 2020-09-01
Payer: COMMERCIAL

## 2020-09-01 VITALS — OXYGEN SATURATION: 98 % | HEART RATE: 72 BPM

## 2020-09-01 DIAGNOSIS — D23.9 DERMATOFIBROMA: ICD-10-CM

## 2020-09-01 DIAGNOSIS — L30.4 INTERTRIGO: Primary | ICD-10-CM

## 2020-09-01 DIAGNOSIS — Z87.898 HISTORY OF ATYPICAL NEVUS: ICD-10-CM

## 2020-09-01 DIAGNOSIS — L82.0 INFLAMED SEBORRHEIC KERATOSIS: ICD-10-CM

## 2020-09-01 DIAGNOSIS — L82.1 SEBORRHEIC KERATOSIS: ICD-10-CM

## 2020-09-01 DIAGNOSIS — D22.9 MULTIPLE BENIGN NEVI: ICD-10-CM

## 2020-09-01 DIAGNOSIS — D18.01 CHERRY ANGIOMA: ICD-10-CM

## 2020-09-01 DIAGNOSIS — L81.4 LENTIGO: ICD-10-CM

## 2020-09-01 PROCEDURE — 17110 DESTRUCTION B9 LES UP TO 14: CPT | Performed by: PHYSICIAN ASSISTANT

## 2020-09-01 PROCEDURE — 99214 OFFICE O/P EST MOD 30 MIN: CPT | Mod: 25 | Performed by: PHYSICIAN ASSISTANT

## 2020-09-01 RX ORDER — NYSTATIN 100000 [USP'U]/G
POWDER TOPICAL
Qty: 60 G | Refills: 11 | Status: ON HOLD | OUTPATIENT
Start: 2020-09-01 | End: 2023-09-24
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
## 2020-09-01 NOTE — NURSING NOTE
Chief Complaint   Patient presents with     Skin Check       Vitals:    09/01/20 0908   Pulse: 72   SpO2: 98%     Wt Readings from Last 1 Encounters:   08/20/20 149.3 kg (329 lb 1.6 oz)       Janny Paredes LPN.................9/1/2020

## 2020-09-17 DIAGNOSIS — Z12.31 VISIT FOR SCREENING MAMMOGRAM: ICD-10-CM

## 2020-09-17 PROCEDURE — 77063 BREAST TOMOSYNTHESIS BI: CPT | Mod: TC

## 2020-09-17 PROCEDURE — 77067 SCR MAMMO BI INCL CAD: CPT | Mod: TC

## 2020-11-19 DIAGNOSIS — I82.5Y9 CHRONIC DEEP VEIN THROMBOSIS (DVT) OF PROXIMAL VEIN OF LOWER EXTREMITY, UNSPECIFIED LATERALITY (H): ICD-10-CM

## 2020-11-20 NOTE — TELEPHONE ENCOUNTER
Requested Prescriptions   Pending Prescriptions Disp Refills     XARELTO ANTICOAGULANT 20 MG TABS tablet [Pharmacy Med Name: XARELTO 20MG TABS] 90 tablet 0     Sig: TAKE ONE TABLET BY MOUTH ONCE DAILY WITH DINNER       Direct Oral Anticoagulant Agents Failed - 11/19/2020  9:55 AM        Failed - Normal Platelets on file in past 12 months     Recent Labs   Lab Test 06/10/20  0834   *               Failed - Creatinine Clearance greater than 50 ml/min on file in past 3 mos     No lab results found.          Failed - Serum creatinine less than or equal to 1.4 on file in past 3 mos     Recent Labs   Lab Test 06/10/20  0834   CR 0.87       Ok to refill medication if creatinine is low          Passed - Medication is active on med list        Passed - Patient is 18 years of age or older        Passed - No active pregnancy on record        Passed - No positive pregnancy test within past 12 months        Passed - Recent (6 mo) or future (30 days) visit within the authorizing provider's specialty           Kimberly MCCABE RN, BSN

## 2020-11-23 RX ORDER — RIVAROXABAN 20 MG/1
TABLET, FILM COATED ORAL
Qty: 90 TABLET | Refills: 0 | Status: SHIPPED | OUTPATIENT
Start: 2020-11-23 | End: 2021-02-16

## 2020-11-29 ENCOUNTER — HEALTH MAINTENANCE LETTER (OUTPATIENT)
Age: 45
End: 2020-11-29

## 2021-02-15 ENCOUNTER — MYC MEDICAL ADVICE (OUTPATIENT)
Dept: FAMILY MEDICINE | Facility: CLINIC | Age: 46
End: 2021-02-15

## 2021-02-15 DIAGNOSIS — I82.5Y9 CHRONIC DEEP VEIN THROMBOSIS (DVT) OF PROXIMAL VEIN OF LOWER EXTREMITY, UNSPECIFIED LATERALITY (H): ICD-10-CM

## 2021-02-16 DIAGNOSIS — I82.5Y9 CHRONIC DEEP VEIN THROMBOSIS (DVT) OF PROXIMAL VEIN OF LOWER EXTREMITY, UNSPECIFIED LATERALITY (H): ICD-10-CM

## 2021-05-11 DIAGNOSIS — I82.5Y9 CHRONIC DEEP VEIN THROMBOSIS (DVT) OF PROXIMAL VEIN OF LOWER EXTREMITY, UNSPECIFIED LATERALITY (H): ICD-10-CM

## 2021-05-11 RX ORDER — RIVAROXABAN 20 MG/1
TABLET, FILM COATED ORAL
Qty: 30 TABLET | Refills: 0 | Status: SHIPPED | OUTPATIENT
Start: 2021-05-11 | End: 2021-05-27

## 2021-05-27 ENCOUNTER — OFFICE VISIT (OUTPATIENT)
Dept: FAMILY MEDICINE | Facility: CLINIC | Age: 46
End: 2021-05-27
Payer: COMMERCIAL

## 2021-05-27 VITALS
SYSTOLIC BLOOD PRESSURE: 110 MMHG | RESPIRATION RATE: 18 BRPM | BODY MASS INDEX: 44.41 KG/M2 | HEIGHT: 68 IN | DIASTOLIC BLOOD PRESSURE: 74 MMHG | TEMPERATURE: 98.2 F | WEIGHT: 293 LBS | HEART RATE: 68 BPM

## 2021-05-27 DIAGNOSIS — D68.52 PROTHROMBIN GENE MUTATION (H): ICD-10-CM

## 2021-05-27 DIAGNOSIS — D68.51 FACTOR V LEIDEN CARRIER (H): ICD-10-CM

## 2021-05-27 DIAGNOSIS — I82.5Y9 CHRONIC DEEP VEIN THROMBOSIS (DVT) OF PROXIMAL VEIN OF LOWER EXTREMITY, UNSPECIFIED LATERALITY (H): Primary | ICD-10-CM

## 2021-05-27 DIAGNOSIS — E66.01 MORBID OBESITY (H): ICD-10-CM

## 2021-05-27 PROCEDURE — 99214 OFFICE O/P EST MOD 30 MIN: CPT | Performed by: FAMILY MEDICINE

## 2021-05-27 ASSESSMENT — MIFFLIN-ST. JEOR: SCORE: 2194.44

## 2021-05-27 NOTE — NURSING NOTE
"Chief Complaint   Patient presents with     Deep Vein Thrombosis     /74 (Cuff Size: Adult Large)   Pulse 68   Temp 98.2  F (36.8  C) (Tympanic)   Resp 18   Ht 1.727 m (5' 8\")   Wt (!) 150.6 kg (332 lb)   Breastfeeding No   BMI 50.48 kg/m   Estimated body mass index is 50.48 kg/m  as calculated from the following:    Height as of this encounter: 1.727 m (5' 8\").    Weight as of this encounter: 150.6 kg (332 lb).  Patient presents to the clinic using No DME      Health Maintenance that is potentially due pending provider review:    Health Maintenance Due   Topic Date Due     ANNUAL REVIEW OF HM ORDERS  Never done     ADVANCE CARE PLANNING  Never done     HEPATITIS C SCREENING  Never done     PAP FOLLOW-UP  09/30/2022     HPV FOLLOW-UP  09/30/2022                "

## 2021-05-27 NOTE — PROGRESS NOTES
"  Assessment & Plan     Chronic deep vein thrombosis (DVT) of proximal vein of lower extremity, unspecified laterality (H)  History of prothrombin gene mutation, factor V Leiden mutation and DVT.  Will continue Xarelto, prescription refilled.  Suggested to continue compression stockings  - rivaroxaban ANTICOAGULANT (XARELTO ANTICOAGULANT) 20 MG TABS tablet; TAKE ONE TABLET BY MOUTH ONCE DAILY WITH DINNER  - Orthotics, Prosthetics and Custom Compression Order  - Lipid panel; Future  - **Glucose FUTURE anytime; Future    Prothrombin gene mutation (H)  - rivaroxaban ANTICOAGULANT (XARELTO ANTICOAGULANT) 20 MG TABS tablet; TAKE ONE TABLET BY MOUTH ONCE DAILY WITH DINNER    Morbid obesity (H)  Healthy lifestyle modifications stressed including regular exercise, balanced diet, weight loss and limiting salt/caffeine/pop intake    Factor V Leiden carrier (H)  - rivaroxaban ANTICOAGULANT (XARELTO ANTICOAGULANT) 20 MG TABS tablet; TAKE ONE TABLET BY MOUTH ONCE DAILY WITH DINNER         BMI:   Estimated body mass index is 50.48 kg/m  as calculated from the following:    Height as of this encounter: 1.727 m (5' 8\").    Weight as of this encounter: 150.6 kg (332 lb).   Weight management plan: Discussed healthy diet and exercise guidelines        Yrn Lopez MD  [facility]    Funmi Rodríguez is a 46 year old who presents for the following health issues     HPI   Medication Followup of Xarelto - Chronic DVT     Taking Medication as prescribed: yes    Side Effects:  None    Medication Helping Symptoms:  Yes    History of hypercoagulability, factor V Leiden mutation and prothrombin gene mutation       Review of Systems   Constitutional, HEENT, cardiovascular, pulmonary, GI, , musculoskeletal, neuro, skin, endocrine and psych systems are negative, except as otherwise noted.      Objective    /74 (Cuff Size: Adult Large)   Pulse 68   Temp 98.2  F (36.8  C) (Tympanic)   Resp 18   Ht 1.727 " "m (5' 8\")   Wt (!) 150.6 kg (332 lb)   Breastfeeding No   BMI 50.48 kg/m    Body mass index is 50.48 kg/m .  Physical Exam   GENERAL: alert and no distress  EYES: Eyes grossly normal to inspection, PERRL and conjunctivae and sclerae normal  NECK: no adenopathy, no asymmetry, masses, or scars and thyroid normal to palpation  RESP: lungs clear to auscultation - no rales, rhonchi or wheezes  CV: regular rate and rhythm, normal S1 S2, no S3 or S4, no murmur, click or rub, no peripheral edema and peripheral pulses strong  ABDOMEN: soft, nontender, no hepatosplenomegaly, no masses and bowel sounds normal  MS: extremities normal- no gross deformities noted  SKIN: no suspicious lesions or rashes  NEURO: Normal strength and tone, mentation intact and speech normal  PSYCH: mentation appears normal, affect normal/bright            "

## 2021-07-23 ENCOUNTER — LAB (OUTPATIENT)
Dept: LAB | Facility: CLINIC | Age: 46
End: 2021-07-23
Payer: COMMERCIAL

## 2021-07-23 DIAGNOSIS — I82.5Y9 CHRONIC DEEP VEIN THROMBOSIS (DVT) OF PROXIMAL VEIN OF LOWER EXTREMITY, UNSPECIFIED LATERALITY (H): ICD-10-CM

## 2021-07-23 LAB
CHOLEST SERPL-MCNC: 209 MG/DL
FASTING STATUS PATIENT QL REPORTED: YES
FASTING STATUS PATIENT QL REPORTED: YES
GLUCOSE BLD-MCNC: 108 MG/DL (ref 70–99)
HDLC SERPL-MCNC: 41 MG/DL
HOLD SPECIMEN: NORMAL
LDLC SERPL CALC-MCNC: 137 MG/DL
NONHDLC SERPL-MCNC: 168 MG/DL
TRIGL SERPL-MCNC: 155 MG/DL

## 2021-07-23 PROCEDURE — 82947 ASSAY GLUCOSE BLOOD QUANT: CPT

## 2021-07-23 PROCEDURE — 36415 COLL VENOUS BLD VENIPUNCTURE: CPT

## 2021-07-23 PROCEDURE — 80061 LIPID PANEL: CPT

## 2021-09-25 ENCOUNTER — HEALTH MAINTENANCE LETTER (OUTPATIENT)
Age: 46
End: 2021-09-25

## 2021-10-05 ENCOUNTER — HOSPITAL ENCOUNTER (OUTPATIENT)
Dept: MAMMOGRAPHY | Facility: CLINIC | Age: 46
Discharge: HOME | End: 2021-10-05
Attending: FAMILY MEDICINE | Admitting: FAMILY MEDICINE
Payer: COMMERCIAL

## 2021-10-05 DIAGNOSIS — Z12.31 VISIT FOR SCREENING MAMMOGRAM: ICD-10-CM

## 2021-10-05 PROCEDURE — 77063 BREAST TOMOSYNTHESIS BI: CPT

## 2021-10-07 ENCOUNTER — HOSPITAL ENCOUNTER (OUTPATIENT)
Dept: ULTRASOUND IMAGING | Facility: CLINIC | Age: 46
Discharge: HOME | End: 2021-10-07
Attending: FAMILY MEDICINE | Admitting: FAMILY MEDICINE
Payer: COMMERCIAL

## 2021-10-07 DIAGNOSIS — R92.8 ABNORMAL MAMMOGRAM: ICD-10-CM

## 2021-10-07 PROCEDURE — 76642 ULTRASOUND BREAST LIMITED: CPT | Mod: LT

## 2021-11-09 ENCOUNTER — OFFICE VISIT (OUTPATIENT)
Dept: DERMATOLOGY | Facility: CLINIC | Age: 46
End: 2021-11-09
Payer: COMMERCIAL

## 2021-11-09 VITALS — OXYGEN SATURATION: 96 % | SYSTOLIC BLOOD PRESSURE: 143 MMHG | DIASTOLIC BLOOD PRESSURE: 92 MMHG | HEART RATE: 71 BPM

## 2021-11-09 DIAGNOSIS — D23.9 DERMATOFIBROMA: ICD-10-CM

## 2021-11-09 DIAGNOSIS — L81.4 LENTIGO: Primary | ICD-10-CM

## 2021-11-09 DIAGNOSIS — L82.1 SEBORRHEIC KERATOSIS: ICD-10-CM

## 2021-11-09 DIAGNOSIS — D22.9 MULTIPLE BENIGN NEVI: ICD-10-CM

## 2021-11-09 DIAGNOSIS — Z87.898 HISTORY OF ATYPICAL NEVUS: ICD-10-CM

## 2021-11-09 DIAGNOSIS — D18.01 CHERRY ANGIOMA: ICD-10-CM

## 2021-11-09 DIAGNOSIS — L60.8 NAIL PITTING: ICD-10-CM

## 2021-11-09 PROCEDURE — 99213 OFFICE O/P EST LOW 20 MIN: CPT | Performed by: PHYSICIAN ASSISTANT

## 2021-11-09 NOTE — NURSING NOTE
Chief Complaint   Patient presents with     Skin Check       Vitals:    11/09/21 0858   BP: (!) 143/92   BP Location: Left arm   Patient Position: Sitting   Cuff Size: Adult Large   Pulse: 71   SpO2: 96%     Wt Readings from Last 1 Encounters:   05/27/21 (!) 150.6 kg (332 lb)       China Valencia LPN .................11/9/2021
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
## 2021-12-06 DIAGNOSIS — D68.52 PROTHROMBIN GENE MUTATION (H): ICD-10-CM

## 2021-12-06 DIAGNOSIS — D68.51 FACTOR V LEIDEN CARRIER (H): ICD-10-CM

## 2021-12-06 DIAGNOSIS — I82.5Y9 CHRONIC DEEP VEIN THROMBOSIS (DVT) OF PROXIMAL VEIN OF LOWER EXTREMITY, UNSPECIFIED LATERALITY (H): ICD-10-CM

## 2021-12-06 NOTE — TELEPHONE ENCOUNTER
Requested Prescriptions   Pending Prescriptions Disp Refills     rivaroxaban ANTICOAGULANT (XARELTO ANTICOAGULANT) 20 MG TABS tablet 90 tablet 1     Sig: TAKE ONE TABLET BY MOUTH ONCE DAILY WITH DINNER       Direct Oral Anticoagulant Agents Failed - 12/6/2021 10:42 AM        Failed - Normal Platelets on file in past 12 months     Recent Labs   Lab Test 06/10/20  0834   *               Failed - Creatinine Clearance greater than 50 ml/min on file in past 3 mos     No lab results found.          Failed - Serum creatinine less than or equal to 1.4 on file in past 3 mos     Recent Labs   Lab Test 06/10/20  0834   CR 0.87       Ok to refill medication if creatinine is low          Failed - Recent (6 mo) or future (30 days) visit within the authorizing provider's specialty      Please advise, how long she soul be on this medication

## 2021-12-06 NOTE — TELEPHONE ENCOUNTER
Patient updated last name with pharmacy as 'Dai' ( name). She is unable to update with My Chart at this time since her license with new last name has not yet arrived. Same patient.     Thanks,   Angie

## 2021-12-28 ENCOUNTER — E-VISIT (OUTPATIENT)
Dept: FAMILY MEDICINE | Facility: CLINIC | Age: 46
End: 2021-12-28
Payer: COMMERCIAL

## 2021-12-28 DIAGNOSIS — N39.0 ACUTE UTI (URINARY TRACT INFECTION): Primary | ICD-10-CM

## 2021-12-28 PROCEDURE — 99421 OL DIG E/M SVC 5-10 MIN: CPT | Performed by: FAMILY MEDICINE

## 2021-12-28 RX ORDER — NITROFURANTOIN 25; 75 MG/1; MG/1
100 CAPSULE ORAL 2 TIMES DAILY
Qty: 10 CAPSULE | Refills: 0 | Status: SHIPPED | OUTPATIENT
Start: 2021-12-28 | End: 2022-01-02

## 2021-12-28 NOTE — PATIENT INSTRUCTIONS
Dear Anne Cortez    After reviewing your responses, I've been able to diagnose you with a urinary tract infection, which is a common infection of the bladder with bacteria.  This is not a sexually transmitted infection, though urinating immediately after intercourse can help prevent infections.  Drinking lots of fluids is also helpful to clear your current infection and prevent the next one.      I have sent a prescription for antibiotics to your pharmacy to treat this infection.    It is important that you take all of your prescribed medication even if your symptoms are improving after a few doses.  Taking all of your medicine helps prevent the symptoms from returning.     If your symptoms worsen, you develop pain in your back or stomach, develop fevers, or are not improving in 5 days, please contact your primary care provider for an appointment or visit any of our convenient Walk-in or Urgent Care Centers to be seen, which can be found on our website here.    Thanks again for choosing us as your health care partner,    Crystal Ireland MD    Urinary Tract Infections in Women  Urinary tract infections (UTIs) are most often caused by bacteria. These bacteria enter the urinary tract. The bacteria may come from inside the body. Or they may travel from the skin outside the rectum or vagina into the urethra. Female anatomy makes it easy for bacteria from the bowel to enter a woman s urinary tract, which is the most common source of UTI. This means women develop UTIs more often than men. Pain in or around the urinary tract is a common UTI symptom. But the only way to know for sure if you have a UTI for the healthcare provider to test your urine. The two tests that may be done are the urinalysis and urine culture.     Types of UTIs    Cystitis. A bladder infection (cystitis) is the most common UTI in women. You may have urgent or frequent need to pee. You may also have pain, burning when you pee, and bloody  urine.    Urethritis. This is an inflamed urethra, which is the tube that carries urine from the bladder to outside the body. You may have lower stomach or back pain. You may also have urgent or frequent need to pee.    Pyelonephritis. This is a kidney infection. If not treated, it can be serious and damage your kidneys. In severe cases, you may need to stay in the hospital. You may have a fever and lower back pain.    Medicines to treat a UTI  Most UTIs are treated with antibiotics. These kill the bacteria. The length of time you need to take them depends on the type of infection. It may be as short as 3 days. If you have repeated UTIs, you may need a low-dose antibiotic for several months. Take antibiotics exactly as directed. Don t stop taking them until all of the medicine is gone. If you stop taking the antibiotic too soon, the infection may not go away. You may also develop a resistance to the antibiotic. This can make it much harder to treat.   Lifestyle changes to treat and prevent UTIs   The lifestyle changes below will help get rid of your UTI. They may also help prevent future UTIs.     Drink plenty of fluids. This includes water, juice, or other caffeine-free drinks. Fluids help flush bacteria out of your body.    Empty your bladder. Always empty your bladder when you feel the urge to pee. And always pee before going to sleep. Urine that stays in your bladder can lead to infection. Try to pee before and after sex as well.    Practice good personal hygiene. Wipe yourself from front to back after using the toilet. This helps keep bacteria from getting into the urethra.    Use condoms during sex. These help prevent UTIs caused by sexually transmitted bacteria. Also don't use spermicides during sex. These can increase the risk for UTIs. Choose other forms of birth control instead. For women who tend to get UTIs after sex, a low-dose of a preventive antibiotic may be used. Be sure to discuss this option with  your healthcare provider.    Follow up with your healthcare provider as directed. He or she may test to make sure the infection has cleared. If needed, more treatment may be started.  Blue last reviewed this educational content on 7/1/2019 2000-2021 [employer]. All rights reserved. This information is not intended as a substitute for professional medical care. Always follow your healthcare professional's instructions.
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
## 2022-02-23 ENCOUNTER — OFFICE VISIT (OUTPATIENT)
Dept: ORTHOPEDICS | Facility: CLINIC | Age: 47
End: 2022-02-23
Payer: COMMERCIAL

## 2022-02-23 ENCOUNTER — ANCILLARY PROCEDURE (OUTPATIENT)
Dept: GENERAL RADIOLOGY | Facility: CLINIC | Age: 47
End: 2022-02-23
Attending: FAMILY MEDICINE
Payer: COMMERCIAL

## 2022-02-23 VITALS — DIASTOLIC BLOOD PRESSURE: 87 MMHG | SYSTOLIC BLOOD PRESSURE: 150 MMHG | WEIGHT: 293 LBS | BODY MASS INDEX: 50.05 KG/M2

## 2022-02-23 DIAGNOSIS — M25.551 RIGHT HIP PAIN: ICD-10-CM

## 2022-02-23 DIAGNOSIS — M16.11 PRIMARY OSTEOARTHRITIS OF RIGHT HIP: ICD-10-CM

## 2022-02-23 DIAGNOSIS — M25.551 RIGHT HIP PAIN: Primary | ICD-10-CM

## 2022-02-23 PROCEDURE — 73502 X-RAY EXAM HIP UNI 2-3 VIEWS: CPT | Mod: RT | Performed by: RADIOLOGY

## 2022-02-23 PROCEDURE — 99204 OFFICE O/P NEW MOD 45 MIN: CPT | Mod: 25 | Performed by: FAMILY MEDICINE

## 2022-02-23 PROCEDURE — 20611 DRAIN/INJ JOINT/BURSA W/US: CPT | Mod: RT | Performed by: FAMILY MEDICINE

## 2022-02-23 RX ORDER — LIDOCAINE HYDROCHLORIDE 10 MG/ML
4 INJECTION, SOLUTION INFILTRATION; PERINEURAL
Status: DISCONTINUED | OUTPATIENT
Start: 2022-02-23 | End: 2023-09-24

## 2022-02-23 RX ORDER — TRIAMCINOLONE ACETONIDE 40 MG/ML
40 INJECTION, SUSPENSION INTRA-ARTICULAR; INTRAMUSCULAR
Status: DISCONTINUED | OUTPATIENT
Start: 2022-02-23 | End: 2023-09-24

## 2022-02-23 RX ORDER — ROPIVACAINE HYDROCHLORIDE 5 MG/ML
3 INJECTION, SOLUTION EPIDURAL; INFILTRATION; PERINEURAL
Status: DISCONTINUED | OUTPATIENT
Start: 2022-02-23 | End: 2023-09-24

## 2022-02-23 RX ADMIN — TRIAMCINOLONE ACETONIDE 40 MG: 40 INJECTION, SUSPENSION INTRA-ARTICULAR; INTRAMUSCULAR at 10:00

## 2022-02-23 RX ADMIN — LIDOCAINE HYDROCHLORIDE 4 ML: 10 INJECTION, SOLUTION INFILTRATION; PERINEURAL at 10:00

## 2022-02-23 RX ADMIN — ROPIVACAINE HYDROCHLORIDE 3 ML: 5 INJECTION, SOLUTION EPIDURAL; INFILTRATION; PERINEURAL at 10:00

## 2022-02-23 ASSESSMENT — PAIN SCALES - GENERAL: PAINLEVEL: MODERATE PAIN (4)
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
## 2022-03-21 ENCOUNTER — THERAPY VISIT (OUTPATIENT)
Dept: PHYSICAL THERAPY | Facility: CLINIC | Age: 47
End: 2022-03-21
Attending: FAMILY MEDICINE
Payer: COMMERCIAL

## 2022-03-21 DIAGNOSIS — M25.551 RIGHT HIP PAIN: ICD-10-CM

## 2022-03-21 DIAGNOSIS — M25.551 HIP PAIN, RIGHT: Primary | ICD-10-CM

## 2022-03-21 DIAGNOSIS — M16.11 PRIMARY OSTEOARTHRITIS OF RIGHT HIP: ICD-10-CM

## 2022-03-21 PROCEDURE — 97161 PT EVAL LOW COMPLEX 20 MIN: CPT | Mod: GP | Performed by: PHYSICAL THERAPIST

## 2022-03-21 PROCEDURE — 97110 THERAPEUTIC EXERCISES: CPT | Mod: GP | Performed by: PHYSICAL THERAPIST

## 2022-03-21 ASSESSMENT — ACTIVITIES OF DAILY LIVING (ADL)
HOS_ADL_SCORE(%): 50
WALKING_15_MINUTES_OR_GREATER: EXTREME DIFFICULTY
SITTING_FOR_15_MINUTES: MODERATE DIFFICULTY
WALKING_UP_STEEP_HILLS: SLIGHT DIFFICULTY
WALKING_INITIALLY: MODERATE DIFFICULTY
HEAVY_WORK: MODERATE DIFFICULTY
GOING_DOWN_1_FLIGHT_OF_STAIRS: SLIGHT DIFFICULTY
GOING_UP_1_FLIGHT_OF_STAIRS: SLIGHT DIFFICULTY
WALKING_APPROXIMATELY_10_MINUTES: SLIGHT DIFFICULTY
STEPPING_UP_AND_DOWN_CURBS: SLIGHT DIFFICULTY
HOS_ADL_COUNT: 17
ROLLING_OVER_IN_BED: MODERATE DIFFICULTY
WALKING_DOWN_STEEP_HILLS: SLIGHT DIFFICULTY
RECREATIONAL_ACTIVITIES: EXTREME DIFFICULTY
GETTING_INTO_AND_OUT_OF_A_BATHTUB: MODERATE DIFFICULTY
DEEP_SQUATTING: UNABLE TO DO
HOW_WOULD_YOU_RATE_YOUR_CURRENT_LEVEL_OF_FUNCTION_DURING_YOUR_USUAL_ACTIVITIES_OF_DAILY_LIVING_FROM_0_TO_100_WITH_100_BEING_YOUR_LEVEL_OF_FUNCTION_PRIOR_TO_YOUR_HIP_PROBLEM_AND_0_BEING_THE_INABILITY_TO_PERFORM_ANY_OF_YOUR_USUAL_DAILY_ACTIVITIES?: 60
LIGHT_TO_MODERATE_WORK: MODERATE DIFFICULTY
HOS_ADL_HIGHEST_POTENTIAL_SCORE: 68
STANDING_FOR_15_MINUTES: UNABLE TO DO
TWISTING/PIVOTING_ON_INVOLVED_LEG: EXTREME DIFFICULTY
HOS_ADL_ITEM_SCORE_TOTAL: 34
PUTTING_ON_SOCKS_AND_SHOES: SLIGHT DIFFICULTY
GETTING_INTO_AND_OUT_OF_AN_AVERAGE_CAR: SLIGHT DIFFICULTY
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
## 2022-04-28 ENCOUNTER — THERAPY VISIT (OUTPATIENT)
Dept: PHYSICAL THERAPY | Facility: CLINIC | Age: 47
End: 2022-04-28
Attending: FAMILY MEDICINE
Payer: COMMERCIAL

## 2022-04-28 DIAGNOSIS — M25.551 HIP PAIN, RIGHT: Primary | ICD-10-CM

## 2022-04-28 PROCEDURE — 97110 THERAPEUTIC EXERCISES: CPT | Mod: GP | Performed by: PHYSICAL THERAPIST

## 2022-04-28 PROCEDURE — 97112 NEUROMUSCULAR REEDUCATION: CPT | Mod: GP | Performed by: PHYSICAL THERAPIST

## 2022-05-12 ENCOUNTER — THERAPY VISIT (OUTPATIENT)
Dept: PHYSICAL THERAPY | Facility: CLINIC | Age: 47
End: 2022-05-12
Attending: FAMILY MEDICINE
Payer: COMMERCIAL

## 2022-05-12 DIAGNOSIS — M25.551 HIP PAIN, RIGHT: Primary | ICD-10-CM

## 2022-05-12 PROCEDURE — 97110 THERAPEUTIC EXERCISES: CPT | Mod: GP | Performed by: PHYSICAL THERAPIST

## 2022-05-12 PROCEDURE — 97112 NEUROMUSCULAR REEDUCATION: CPT | Mod: GP | Performed by: PHYSICAL THERAPIST

## 2022-06-13 DIAGNOSIS — D68.51 FACTOR V LEIDEN CARRIER (H): ICD-10-CM

## 2022-06-13 DIAGNOSIS — I82.5Y9 CHRONIC DEEP VEIN THROMBOSIS (DVT) OF PROXIMAL VEIN OF LOWER EXTREMITY, UNSPECIFIED LATERALITY (H): ICD-10-CM

## 2022-06-13 DIAGNOSIS — D68.52 PROTHROMBIN GENE MUTATION (H): ICD-10-CM

## 2022-06-13 RX ORDER — RIVAROXABAN 20 MG/1
TABLET, FILM COATED ORAL
Qty: 90 TABLET | Refills: 1 | Status: SHIPPED | OUTPATIENT
Start: 2022-06-13 | End: 2022-12-09

## 2022-06-13 NOTE — TELEPHONE ENCOUNTER
Requested Prescriptions   Pending Prescriptions Disp Refills     XARELTO ANTICOAGULANT 20 MG TABS tablet [Pharmacy Med Name: XARELTO 20MG TABS] 90 tablet 1     Sig: TAKE 1 TABLET BY MOUTH 1 TIME DAILY WITH DINNER       Direct Oral Anticoagulant Agents Failed - 6/13/2022 10:31 AM        Failed - Normal Platelets on file in past 12 months     Recent Labs   Lab Test 06/10/20  0834   *               Failed - Creatinine Clearance greater than 50 ml/min on file in past 3 mos     No lab results found.          Failed - Serum creatinine less than or equal to 1.4 on file in past 3 mos     Recent Labs   Lab Test 06/10/20  0834   CR 0.87       Ok to refill medication if creatinine is low          Passed - Medication is active on med list        Passed - Patient is 18 years of age or older        Passed - No active pregnancy on record        Passed - No positive pregnancy test within past 12 months        Passed - Recent (6 mo) or future (30 days) visit within the authorizing provider's specialty

## 2022-09-26 PROBLEM — M25.551 HIP PAIN, RIGHT: Status: RESOLVED | Noted: 2019-08-14 | Resolved: 2022-09-26

## 2022-09-26 NOTE — PROGRESS NOTES
DISCHARGE SUMMARY    Anne Dai was seen 3 times for evaluation and treatment.  Patient did not return for further treatment and current status is unknown.  Due to short treatment duration, no objective or functional changes were made.  Please see goal flow sheet from episode noted date below and initial evaluation for further information.  Patient is discharged from therapy and therapy episode is resolved as of 09/26/22.      Linked Episodes   Type: Episode: Status: Noted: Resolved: Last update: Updated by:   PHYSICAL THERAPY 03/21/22 R Hip  Active 3/21/2022  5/12/2022  1:53 PM Abbie Hurt, PT      Comments:

## 2022-10-31 ENCOUNTER — HOSPITAL ENCOUNTER (OUTPATIENT)
Dept: MAMMOGRAPHY | Facility: CLINIC | Age: 47
Discharge: HOME | End: 2022-10-31
Attending: FAMILY MEDICINE | Admitting: FAMILY MEDICINE
Payer: COMMERCIAL

## 2022-10-31 DIAGNOSIS — Z12.31 VISIT FOR SCREENING MAMMOGRAM: ICD-10-CM

## 2022-10-31 PROCEDURE — 77067 SCR MAMMO BI INCL CAD: CPT

## 2022-11-17 ENCOUNTER — OFFICE VISIT (OUTPATIENT)
Dept: OBGYN | Facility: CLINIC | Age: 47
End: 2022-11-17
Payer: COMMERCIAL

## 2022-11-17 VITALS
BODY MASS INDEX: 48.85 KG/M2 | SYSTOLIC BLOOD PRESSURE: 124 MMHG | WEIGHT: 293 LBS | DIASTOLIC BLOOD PRESSURE: 82 MMHG | HEART RATE: 80 BPM

## 2022-11-17 DIAGNOSIS — N87.0 MILD DYSPLASIA OF CERVIX: Primary | ICD-10-CM

## 2022-11-17 DIAGNOSIS — Z01.419 ENCOUNTER FOR GYNECOLOGICAL EXAMINATION WITHOUT ABNORMAL FINDING: ICD-10-CM

## 2022-11-17 DIAGNOSIS — Z12.4 SCREENING FOR CERVICAL CANCER: ICD-10-CM

## 2022-11-17 PROCEDURE — 87624 HPV HI-RISK TYP POOLED RSLT: CPT | Performed by: OBSTETRICS & GYNECOLOGY

## 2022-11-17 PROCEDURE — 99396 PREV VISIT EST AGE 40-64: CPT | Performed by: OBSTETRICS & GYNECOLOGY

## 2022-11-17 PROCEDURE — G0145 SCR C/V CYTO,THINLAYER,RESCR: HCPCS | Performed by: OBSTETRICS & GYNECOLOGY

## 2022-11-17 NOTE — NURSING NOTE
"Chief Complaint   Patient presents with     Gyn Exam     Gyn & breast exam   Pap 2019 NIL HPV =Neg    Due  Mammo 10/31/2022        Initial /82 (BP Location: Right arm, Cuff Size: Adult Large)   Pulse 80   Wt 145.7 kg (321 lb 4.8 oz)   BMI 48.85 kg/m   Estimated body mass index is 48.85 kg/m  as calculated from the following:    Height as of 21: 1.727 m (5' 8\").    Weight as of this encounter: 145.7 kg (321 lb 4.8 oz).  BP completed using cuff size: large    Questioned patient about current smoking habits.  Pt. has never smoked.          The following HM Due: pap smear        Rachel De La Cruz CMA on 2022 at 2:08 PM        "
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
## 2022-11-21 LAB
BKR LAB AP GYN ADEQUACY: NORMAL
BKR LAB AP GYN INTERPRETATION: NORMAL
BKR LAB AP HPV REFLEX: NORMAL
BKR LAB AP PREVIOUS ABNORMAL: NORMAL
PATH REPORT.COMMENTS IMP SPEC: NORMAL
PATH REPORT.COMMENTS IMP SPEC: NORMAL
PATH REPORT.RELEVANT HX SPEC: NORMAL

## 2022-11-23 LAB
HUMAN PAPILLOMA VIRUS 16 DNA: NEGATIVE
HUMAN PAPILLOMA VIRUS 18 DNA: NEGATIVE
HUMAN PAPILLOMA VIRUS FINAL DIAGNOSIS: NORMAL
HUMAN PAPILLOMA VIRUS OTHER HR: NEGATIVE

## 2022-12-06 ENCOUNTER — OFFICE VISIT (OUTPATIENT)
Dept: DERMATOLOGY | Facility: CLINIC | Age: 47
End: 2022-12-06
Payer: COMMERCIAL

## 2022-12-06 DIAGNOSIS — L81.4 LENTIGO: Primary | ICD-10-CM

## 2022-12-06 DIAGNOSIS — Z87.898 HISTORY OF ATYPICAL NEVUS: ICD-10-CM

## 2022-12-06 DIAGNOSIS — L82.1 SEBORRHEIC KERATOSIS: ICD-10-CM

## 2022-12-06 DIAGNOSIS — D68.52 PROTHROMBIN GENE MUTATION (H): ICD-10-CM

## 2022-12-06 DIAGNOSIS — L73.8 SEBACEOUS HYPERPLASIA: ICD-10-CM

## 2022-12-06 DIAGNOSIS — I82.5Y9 CHRONIC DEEP VEIN THROMBOSIS (DVT) OF PROXIMAL VEIN OF LOWER EXTREMITY, UNSPECIFIED LATERALITY (H): ICD-10-CM

## 2022-12-06 DIAGNOSIS — D23.9 DERMATOFIBROMA: ICD-10-CM

## 2022-12-06 DIAGNOSIS — D22.9 MULTIPLE BENIGN NEVI: ICD-10-CM

## 2022-12-06 DIAGNOSIS — L82.0 INFLAMED SEBORRHEIC KERATOSIS: ICD-10-CM

## 2022-12-06 DIAGNOSIS — D68.51 FACTOR V LEIDEN CARRIER (H): ICD-10-CM

## 2022-12-06 DIAGNOSIS — D18.01 CHERRY ANGIOMA: ICD-10-CM

## 2022-12-06 PROCEDURE — 17110 DESTRUCTION B9 LES UP TO 14: CPT | Performed by: PHYSICIAN ASSISTANT

## 2022-12-06 PROCEDURE — 99213 OFFICE O/P EST LOW 20 MIN: CPT | Mod: 25 | Performed by: PHYSICIAN ASSISTANT

## 2022-12-06 ASSESSMENT — PAIN SCALES - GENERAL: PAINLEVEL: NO PAIN (0)
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
## 2022-12-06 NOTE — NURSING NOTE
Chief Complaint   Patient presents with     Skin Check     Chest, Back, R  Cheek        There were no vitals filed for this visit.  Wt Readings from Last 1 Encounters:   11/17/22 145.7 kg (321 lb 4.8 oz)       China Valencia LPN .................12/6/2022

## 2022-12-09 RX ORDER — RIVAROXABAN 20 MG/1
TABLET, FILM COATED ORAL
Qty: 90 TABLET | Refills: 1 | Status: SHIPPED | OUTPATIENT
Start: 2022-12-09 | End: 2023-06-10

## 2022-12-09 NOTE — TELEPHONE ENCOUNTER
Routing to Provider for consideration, protocol not met   Direct Oral Anticoagulant Agents Failed 12/06/2022 12:33 PM   Protocol Details  Normal Platelets on file in past 12 months    Creatinine Clearance greater than 50 ml/min on file in past 3 mos    Serum creatinine less than or equal to 1.4 on file in past 3 mos    Recent (6 mo) or future (30 days) visit within the authorizing provider's specialty

## 2023-03-10 ENCOUNTER — MYC MEDICAL ADVICE (OUTPATIENT)
Dept: FAMILY MEDICINE | Facility: CLINIC | Age: 48
End: 2023-03-10
Payer: COMMERCIAL

## 2023-03-11 DIAGNOSIS — Z86.718 HISTORY OF DVT (DEEP VEIN THROMBOSIS): ICD-10-CM

## 2023-03-11 DIAGNOSIS — I82.5Y9 CHRONIC DEEP VEIN THROMBOSIS (DVT) OF PROXIMAL VEIN OF LOWER EXTREMITY, UNSPECIFIED LATERALITY (H): Primary | ICD-10-CM

## 2023-03-27 ENCOUNTER — OFFICE VISIT (OUTPATIENT)
Dept: FAMILY MEDICINE | Facility: CLINIC | Age: 48
End: 2023-03-27
Payer: COMMERCIAL

## 2023-03-27 VITALS
TEMPERATURE: 97.9 F | RESPIRATION RATE: 18 BRPM | OXYGEN SATURATION: 99 % | SYSTOLIC BLOOD PRESSURE: 110 MMHG | WEIGHT: 293 LBS | BODY MASS INDEX: 44.41 KG/M2 | DIASTOLIC BLOOD PRESSURE: 80 MMHG | HEIGHT: 68 IN | HEART RATE: 76 BPM

## 2023-03-27 DIAGNOSIS — Z13.220 SCREENING FOR HYPERLIPIDEMIA: ICD-10-CM

## 2023-03-27 DIAGNOSIS — Z12.11 SCREEN FOR COLON CANCER: ICD-10-CM

## 2023-03-27 DIAGNOSIS — Z86.718 HISTORY OF DVT (DEEP VEIN THROMBOSIS): Primary | ICD-10-CM

## 2023-03-27 DIAGNOSIS — Z11.59 NEED FOR HEPATITIS C SCREENING TEST: ICD-10-CM

## 2023-03-27 LAB
ANION GAP SERPL CALCULATED.3IONS-SCNC: 11 MMOL/L (ref 7–15)
BUN SERPL-MCNC: 16.6 MG/DL (ref 6–20)
CALCIUM SERPL-MCNC: 9.6 MG/DL (ref 8.6–10)
CHLORIDE SERPL-SCNC: 105 MMOL/L (ref 98–107)
CHOLEST SERPL-MCNC: 212 MG/DL
CREAT SERPL-MCNC: 0.91 MG/DL (ref 0.51–0.95)
DEPRECATED HCO3 PLAS-SCNC: 26 MMOL/L (ref 22–29)
ERYTHROCYTE [DISTWIDTH] IN BLOOD BY AUTOMATED COUNT: 12.3 % (ref 10–15)
FASTING STATUS PATIENT QL REPORTED: YES
GFR SERPL CREATININE-BSD FRML MDRD: 77 ML/MIN/1.73M2
GLUCOSE SERPL-MCNC: 106 MG/DL (ref 70–99)
GLUCOSE SERPL-MCNC: 106 MG/DL (ref 70–99)
HCT VFR BLD AUTO: 43.6 % (ref 35–47)
HCV AB SERPL QL IA: NONREACTIVE
HDLC SERPL-MCNC: 42 MG/DL
HGB BLD-MCNC: 14.2 G/DL (ref 11.7–15.7)
LDLC SERPL CALC-MCNC: 140 MG/DL
MCH RBC QN AUTO: 28.5 PG (ref 26.5–33)
MCHC RBC AUTO-ENTMCNC: 32.6 G/DL (ref 31.5–36.5)
MCV RBC AUTO: 87 FL (ref 78–100)
NONHDLC SERPL-MCNC: 170 MG/DL
PLATELET # BLD AUTO: 189 10E3/UL (ref 150–450)
POTASSIUM SERPL-SCNC: 4.3 MMOL/L (ref 3.4–5.3)
RBC # BLD AUTO: 4.99 10E6/UL (ref 3.8–5.2)
SODIUM SERPL-SCNC: 142 MMOL/L (ref 136–145)
TRIGL SERPL-MCNC: 151 MG/DL
WBC # BLD AUTO: 7.8 10E3/UL (ref 4–11)

## 2023-03-27 PROCEDURE — 80048 BASIC METABOLIC PNL TOTAL CA: CPT | Performed by: FAMILY MEDICINE

## 2023-03-27 PROCEDURE — 80061 LIPID PANEL: CPT | Performed by: FAMILY MEDICINE

## 2023-03-27 PROCEDURE — 99213 OFFICE O/P EST LOW 20 MIN: CPT | Performed by: FAMILY MEDICINE

## 2023-03-27 PROCEDURE — 85027 COMPLETE CBC AUTOMATED: CPT | Performed by: FAMILY MEDICINE

## 2023-03-27 PROCEDURE — 36415 COLL VENOUS BLD VENIPUNCTURE: CPT | Performed by: FAMILY MEDICINE

## 2023-03-27 PROCEDURE — 86803 HEPATITIS C AB TEST: CPT | Performed by: FAMILY MEDICINE

## 2023-03-27 ASSESSMENT — PAIN SCALES - GENERAL: PAINLEVEL: NO PAIN (0)

## 2023-03-27 NOTE — PROGRESS NOTES
"  Assessment & Plan     History of DVT (deep vein thrombosis)  Recommended to continue Eliquis.  - CBC with platelets; Future  - Basic metabolic panel  (Ca, Cl, CO2, Creat, Gluc, K, Na, BUN); Future  - Glucose; Future    Screen for colon cancer  Screening colonoscopy ordered  - Colonoscopy Screening  Referral; Future    Screening for hyperlipidemia  Healthy lifestyle modifications stressed including regular exercise, balanced diet, weight loss and limiting salt/caffeine/pop intake  - Lipid panel; Future    Need for hepatitis C screening test  - Hepatitis C Screen Reflex to HCV RNA Quant and Genotype; Future         BMI:   Estimated body mass index is 48.81 kg/m  as calculated from the following:    Height as of this encounter: 1.727 m (5' 8\").    Weight as of this encounter: 145.6 kg (321 lb).   Weight management plan: Discussed healthy diet and exercise guidelines      Yrn Lopez MD  [facility]    Funmi Rodríguez is a 48 year old, presenting for the following health issues:  Deep Vein Thrombosis    HPI     Concern - History of DVT   Onset: long time- october 2018  Description: On xarelto- needs regular labs   Intensity: mild  Progression of Symptoms:  same  Accompanying Signs & Symptoms: Factor 7  Previous history of similar problem:   Therapies tried and outcome: xarelto, and compression stockings     Please call [phone] to schedule colonoscopy.    Review of Systems   Constitutional, HEENT, cardiovascular, pulmonary, GI, , musculoskeletal, neuro, skin, endocrine and psych systems are negative, except as otherwise noted.      Objective    /80 (Cuff Size: Adult Large)   Pulse 76   Temp 97.9  F (36.6  C) (Tympanic)   Resp 18   Ht 1.727 m (5' 8\")   Wt 145.6 kg (321 lb)   LMP  (LMP Unknown)   SpO2 99%   BMI 48.81 kg/m    Body mass index is 48.81 kg/m .  Physical Exam   GENERAL: alert and no distress  NECK: no adenopathy, no asymmetry, masses, or scars " and thyroid normal to palpation  RESP: lungs clear to auscultation - no rales, rhonchi or wheezes  CV: regular rate and rhythm, normal S1 S2, no S3 or S4, no murmur, click or rub  ABDOMEN: soft, nontender, no hepatosplenomegaly, no masses  MS: no gross musculoskeletal defects noted, no edema  NEURO: Normal strength and tone, mentation intact and speech normal  PSYCH: mentation appears normal, affect normal/bright

## 2023-03-27 NOTE — NURSING NOTE
"Chief Complaint   Patient presents with     Deep Vein Thrombosis     /80 (Cuff Size: Adult Large)   Pulse 76   Temp 97.9  F (36.6  C) (Tympanic)   Resp 18   Ht 1.727 m (5' 8\")   Wt 145.6 kg (321 lb)   LMP  (LMP Unknown)   SpO2 99%   BMI 48.81 kg/m   Estimated body mass index is 48.81 kg/m  as calculated from the following:    Height as of this encounter: 1.727 m (5' 8\").    Weight as of this encounter: 145.6 kg (321 lb).  Patient presents to the clinic using No DME      Health Maintenance that is potentially due pending provider review:    Health Maintenance Due   Topic Date Due     ANNUAL REVIEW OF HM ORDERS  Never done     ADVANCE CARE PLANNING  Never done     HEPATITIS B IMMUNIZATION (1 of 3 - 3-dose series) Never done     COLORECTAL CANCER SCREENING  Never done     HEPATITIS C SCREENING  Never done                "

## 2023-04-05 DIAGNOSIS — E66.01 MORBID OBESITY (H): Primary | ICD-10-CM

## 2023-05-04 ENCOUNTER — OFFICE VISIT (OUTPATIENT)
Dept: FAMILY MEDICINE | Facility: CLINIC | Age: 48
End: 2023-05-04
Payer: COMMERCIAL

## 2023-05-04 ENCOUNTER — TELEPHONE (OUTPATIENT)
Dept: DERMATOLOGY | Facility: CLINIC | Age: 48
End: 2023-05-04

## 2023-05-04 VITALS
HEIGHT: 69 IN | TEMPERATURE: 99.5 F | RESPIRATION RATE: 16 BRPM | HEART RATE: 97 BPM | OXYGEN SATURATION: 96 % | SYSTOLIC BLOOD PRESSURE: 138 MMHG | DIASTOLIC BLOOD PRESSURE: 76 MMHG | WEIGHT: 293 LBS | BODY MASS INDEX: 43.4 KG/M2

## 2023-05-04 DIAGNOSIS — L97.121 SKIN ULCER OF LEFT THIGH, LIMITED TO BREAKDOWN OF SKIN (H): Primary | ICD-10-CM

## 2023-05-04 DIAGNOSIS — L97.111 SKIN ULCER OF RIGHT HIP, LIMITED TO BREAKDOWN OF SKIN (H): ICD-10-CM

## 2023-05-04 DIAGNOSIS — E66.01 MORBID OBESITY (H): ICD-10-CM

## 2023-05-04 DIAGNOSIS — D68.52 PROTHROMBIN GENE MUTATION (H): ICD-10-CM

## 2023-05-04 DIAGNOSIS — I82.5Y9 CHRONIC DEEP VEIN THROMBOSIS (DVT) OF PROXIMAL VEIN OF LOWER EXTREMITY, UNSPECIFIED LATERALITY (H): ICD-10-CM

## 2023-05-04 PROCEDURE — 99214 OFFICE O/P EST MOD 30 MIN: CPT | Performed by: FAMILY MEDICINE

## 2023-05-04 RX ORDER — CEPHALEXIN 500 MG/1
500 CAPSULE ORAL 4 TIMES DAILY
Qty: 28 CAPSULE | Refills: 0 | Status: SHIPPED | OUTPATIENT
Start: 2023-05-04 | End: 2023-05-11

## 2023-05-04 ASSESSMENT — PAIN SCALES - GENERAL: PAINLEVEL: MILD PAIN (3)

## 2023-05-04 NOTE — PATIENT INSTRUCTIONS
You will be contacted in 1-2 business days to get a schedule for the dermatology specialist   If no call by Monday next week, you may contact the referral number.    Start cephalexin to treat possible start of secondary bacterial infection on the right hip ulcer.    If you develop fever, chills, expanding ulcer or redness, or leg swelling, see a provider in the ER.

## 2023-05-04 NOTE — TELEPHONE ENCOUNTER
This encounter is being sent to inform the clinic that this patient has a referral from Andrew Chavarria MD in [facility] for the diagnoses of Skin ulcer of left thigh, limited to breakdown of skin (H); Pt has developed spontaneous skin ulcers on right lateral hip and left anterior thigh and has requested that this patient be seen within Priority: 1-2 Weeks and/or with Priority: 1-2 Weeks.  Based on the availability of our provider(s), we are unable to accommodate this request.      Were all sites offered this patient?  Yes  Pt requesting [place] location only due to where she lives - Thanks    Does scheduling algorithm request to schedule next available?  Patient appointment has not been scheduled.  Please review the referral request for accommodation and contact the patient.  If unable to accommodate, please resubmit a referral and indicate a preferred partner or affiliate location using Provider Finder or Scheduling Instructions field.        Referral Order in Epic  Records in Epic  No outside Records    Please review and call Pt to schedule    Thanks!

## 2023-05-05 ENCOUNTER — VIRTUAL VISIT (OUTPATIENT)
Dept: EDUCATION SERVICES | Facility: CLINIC | Age: 48
End: 2023-05-05
Attending: FAMILY MEDICINE
Payer: COMMERCIAL

## 2023-05-05 DIAGNOSIS — E66.01 MORBID OBESITY (H): ICD-10-CM

## 2023-05-05 PROCEDURE — 97802 MEDICAL NUTRITION INDIV IN: CPT | Mod: VID | Performed by: DIETITIAN, REGISTERED
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
## 2023-05-08 ENCOUNTER — OFFICE VISIT (OUTPATIENT)
Dept: DERMATOLOGY | Facility: CLINIC | Age: 48
End: 2023-05-08
Payer: COMMERCIAL

## 2023-05-08 DIAGNOSIS — L72.0 RUPTURED EPITHELIAL CYST: Primary | ICD-10-CM

## 2023-05-08 DIAGNOSIS — T14.8XXA EXCORIATION: ICD-10-CM

## 2023-05-08 PROCEDURE — 99213 OFFICE O/P EST LOW 20 MIN: CPT | Performed by: DERMATOLOGY

## 2023-05-08 ASSESSMENT — PAIN SCALES - GENERAL: PAINLEVEL: MILD PAIN (3)
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
## 2023-05-08 NOTE — PATIENT INSTRUCTIONS
Wound Care Instructions     FOR SUPERFICIAL WOUNDS     [facility] [phone]    [facility] [phone]      Left hip and right thigh                  AFTER 24 HOURS YOU SHOULD REMOVE THE BANDAGE AND BEGIN DAILY DRESSING CHANGES AS FOLLOWS:     1) Remove Dressing.     2) Clean and dry the area with tap water using a Q-tip or sterile gauze pad.     3) Apply Vaseline, Aquaphor, Polysporin ointment or Bacitracin ointment over entire wound.  Do NOT use Neosporin ointment.     4) Cover the wound with a band-aid, or a sterile non-stick gauze pad and micropore paper tape      REPEAT THESE INSTRUCTIONS AT LEAST ONCE A DAY UNTIL THE WOUND HAS COMPLETELY HEALED.    It is an old wives tale that a wound heals better when it is exposed to air and allowed to dry out. The wound will heal faster with a better cosmetic result if it is kept moist with ointment and covered with a bandage.    **Do not let the wound dry out.**      Supplies Needed:      *Cotton tipped applicators (Q-tips)    *Polysporin Ointment or Bacitracin Ointment (NOT NEOSPORIN)    *Band-aids or non-stick gauze pads and micropore paper tape.      PATIENT INFORMATION:    During the healing process you will notice a number of changes. All wounds develop a small halo of redness surrounding the wound.  This means healing is occurring. Severe itching with extensive redness usually indicates sensitivity to the ointment or bandage tape used to dress the wound.  You should call our office if this develops.      Swelling  and/or discoloration around your surgical site is common, particularly when performed around the eye.    All wounds normally drain.  The larger the wound the more drainage there will be.  After 7-10 days, you will notice the wound beginning to shrink and new skin will begin to grow.  The wound is healed when you can see skin has formed over the entire area.  A healed wound has a healthy, shiny look to the surface and is red  to dark pink in color to normalize.  Wounds may take approximately 4-6 weeks to heal.  Larger wounds may take 6-8 weeks.  After the wound is healed you may discontinue dressing changes.    You may experience a sensation of tightness as your wound heals. This is normal and will gradually subside.    Your healed wound may be sensitive to temperature changes. This sensitivity improves with time, but if you re having a lot of discomfort, try to avoid temperature extremes.    Patients frequently experience itching after their wound appears to have healed because of the continue healing under the skin.  Plain Vaseline will help relieve the itching.    POSSIBLE COMPLICATIONS    BLEEDING:    Leave the bandage in place.  Use tightly rolled up gauze or a cloth to apply direct pressure over the bandage for 30  minutes.  Reapply pressure for an additional 30 minutes if necessary  Use additional gauze and tape to maintain pressure once the bleeding has stopped.
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
## 2023-05-09 ENCOUNTER — TELEPHONE (OUTPATIENT)
Dept: DERMATOLOGY | Facility: CLINIC | Age: 48
End: 2023-05-09
Payer: COMMERCIAL

## 2023-05-09 NOTE — TELEPHONE ENCOUNTER
Date and Time: 05/09/23 1:20 PM    Call Reason: appointment    Outcome: Left message on cell phone for Anne Dai to call and schedule an appointment:    Clinic: Dermatology    With: Brodie Stockton MD    Appointment should be scheduled within:  6weeks from last appt with Brodie Stockton M.D.    Visit Mode: virtual visit      Reason for Appointment: Follow up    Provider Approved Double Book:No    Additional Information: 6w follow up indio, Advise to upload photos prior to telephone visit

## 2023-06-09 DIAGNOSIS — D68.52 PROTHROMBIN GENE MUTATION (H): ICD-10-CM

## 2023-06-09 DIAGNOSIS — D68.51 FACTOR V LEIDEN CARRIER (H): ICD-10-CM

## 2023-06-09 DIAGNOSIS — I82.5Y9 CHRONIC DEEP VEIN THROMBOSIS (DVT) OF PROXIMAL VEIN OF LOWER EXTREMITY, UNSPECIFIED LATERALITY (H): ICD-10-CM

## 2023-06-09 NOTE — TELEPHONE ENCOUNTER
Requested Prescriptions   Pending Prescriptions Disp Refills    XARELTO ANTICOAGULANT 20 MG TABS tablet [Pharmacy Med Name: XARELTO 20MG TABS] 90 tablet 1     Sig: TAKE 1 TABLET BY MOUTH 1 TIME DAILY WITH DINNER       Direct Oral Anticoagulant Agents Failed - 6/9/2023  9:22 AM        Failed - Creatinine Clearance greater than 50 ml/min on file in past 3 mos     No lab results found.            Passed - Normal Platelets on file in past 12 months     Recent Labs   Lab Test 03/27/23  1027                  Passed - Medication is active on med list        Passed - Serum creatinine less than or equal to 1.4 on file in past 3 mos     Recent Labs   Lab Test 03/27/23  1027   CR 0.91       Ok to refill medication if creatinine is low          Passed - Patient is 18 years of age or older        Passed - No active pregnancy on record        Passed - No positive pregnancy test within past 12 months        Passed - Recent (6 mo) or future (30 days) visit within the authorizing provider's specialty

## 2023-06-10 RX ORDER — RIVAROXABAN 20 MG/1
TABLET, FILM COATED ORAL
Qty: 90 TABLET | Refills: 1 | Status: SHIPPED | OUTPATIENT
Start: 2023-06-10 | End: 2023-12-12

## 2023-09-23 ENCOUNTER — HOSPITAL ENCOUNTER (INPATIENT)
Facility: CLINIC | Age: 48
LOS: 1 days | Discharge: OTHER INSTITUTION | DRG: 281 | End: 2023-09-24
Attending: EMERGENCY MEDICINE | Admitting: INTERNAL MEDICINE
Payer: COMMERCIAL

## 2023-09-23 DIAGNOSIS — I21.4 NSTEMI (NON-ST ELEVATED MYOCARDIAL INFARCTION) (H): ICD-10-CM

## 2023-09-23 PROCEDURE — 93005 ELECTROCARDIOGRAM TRACING: CPT | Performed by: EMERGENCY MEDICINE

## 2023-09-23 PROCEDURE — 99291 CRITICAL CARE FIRST HOUR: CPT | Mod: 25 | Performed by: EMERGENCY MEDICINE

## 2023-09-23 PROCEDURE — 93010 ELECTROCARDIOGRAM REPORT: CPT | Performed by: EMERGENCY MEDICINE

## 2023-09-23 RX ORDER — ACETAMINOPHEN 500 MG
500-1000 TABLET ORAL EVERY 6 HOURS PRN
COMMUNITY

## 2023-09-24 ENCOUNTER — HOSPITAL ENCOUNTER (INPATIENT)
Facility: CLINIC | Age: 48
LOS: 2 days | Discharge: HOME | DRG: 281 | End: 2023-09-26
Attending: HOSPITALIST | Admitting: INTERNAL MEDICINE
Payer: COMMERCIAL

## 2023-09-24 ENCOUNTER — APPOINTMENT (OUTPATIENT)
Dept: CT IMAGING | Facility: CLINIC | Age: 48
DRG: 281 | End: 2023-09-24
Attending: EMERGENCY MEDICINE
Payer: COMMERCIAL

## 2023-09-24 VITALS
OXYGEN SATURATION: 98 % | TEMPERATURE: 97.6 F | HEART RATE: 90 BPM | BODY MASS INDEX: 49.45 KG/M2 | WEIGHT: 293 LBS | DIASTOLIC BLOOD PRESSURE: 70 MMHG | RESPIRATION RATE: 17 BRPM | SYSTOLIC BLOOD PRESSURE: 121 MMHG

## 2023-09-24 DIAGNOSIS — E78.5 DYSLIPIDEMIA: Chronic | ICD-10-CM

## 2023-09-24 DIAGNOSIS — I21.4 NSTEMI (NON-ST ELEVATED MYOCARDIAL INFARCTION) (H): Primary | ICD-10-CM

## 2023-09-24 DIAGNOSIS — I10 HYPERTENSION, UNSPECIFIED TYPE: ICD-10-CM

## 2023-09-24 PROBLEM — I21.3 ACUTE ST ELEVATION MYOCARDIAL INFARCTION (STEMI) (H): Status: ACTIVE | Noted: 2023-09-24

## 2023-09-24 LAB
ALBUMIN SERPL BCG-MCNC: 4.5 G/DL (ref 3.5–5.2)
ALP SERPL-CCNC: 77 U/L (ref 35–104)
ALT SERPL W P-5'-P-CCNC: 21 U/L (ref 0–50)
ANION GAP SERPL CALCULATED.3IONS-SCNC: 13 MMOL/L (ref 7–15)
ANION GAP SERPL CALCULATED.3IONS-SCNC: 15 MMOL/L (ref 7–15)
APTT PPP: 35 SECONDS (ref 22–38)
APTT PPP: 36 SECONDS (ref 22–38)
APTT PPP: 64 SECONDS (ref 22–38)
AST SERPL W P-5'-P-CCNC: 20 U/L (ref 0–45)
BASOPHILS # BLD AUTO: 0 10E3/UL (ref 0–0.2)
BASOPHILS NFR BLD AUTO: 0 %
BILIRUB SERPL-MCNC: 0.6 MG/DL
BUN SERPL-MCNC: 12.8 MG/DL (ref 6–20)
BUN SERPL-MCNC: 13.1 MG/DL (ref 6–20)
CALCIUM SERPL-MCNC: 8.5 MG/DL (ref 8.6–10)
CALCIUM SERPL-MCNC: 9.5 MG/DL (ref 8.6–10)
CHLORIDE SERPL-SCNC: 103 MMOL/L (ref 98–107)
CHLORIDE SERPL-SCNC: 103 MMOL/L (ref 98–107)
CREAT SERPL-MCNC: 0.7 MG/DL (ref 0.51–0.95)
CREAT SERPL-MCNC: 0.77 MG/DL (ref 0.51–0.95)
DEPRECATED HCO3 PLAS-SCNC: 20 MMOL/L (ref 22–29)
DEPRECATED HCO3 PLAS-SCNC: 21 MMOL/L (ref 22–29)
EGFRCR SERPLBLD CKD-EPI 2021: >90 ML/MIN/1.73M2
EGFRCR SERPLBLD CKD-EPI 2021: >90 ML/MIN/1.73M2
EOSINOPHIL # BLD AUTO: 0.2 10E3/UL (ref 0–0.7)
EOSINOPHIL NFR BLD AUTO: 2 %
ERYTHROCYTE [DISTWIDTH] IN BLOOD BY AUTOMATED COUNT: 12.2 % (ref 10–15)
ERYTHROCYTE [DISTWIDTH] IN BLOOD BY AUTOMATED COUNT: 12.4 % (ref 10–15)
ERYTHROCYTE [DISTWIDTH] IN BLOOD BY AUTOMATED COUNT: 12.5 % (ref 10–15)
GLUCOSE SERPL-MCNC: 130 MG/DL (ref 70–99)
GLUCOSE SERPL-MCNC: 130 MG/DL (ref 70–99)
HBA1C MFR BLD: 5.3 %
HCT VFR BLD AUTO: 41.5 % (ref 35–47)
HCT VFR BLD AUTO: 42.2 % (ref 35–47)
HCT VFR BLD AUTO: 46.7 % (ref 35–47)
HGB BLD-MCNC: 13.7 G/DL (ref 11.7–15.7)
HGB BLD-MCNC: 14 G/DL (ref 11.7–15.7)
HGB BLD-MCNC: 15.3 G/DL (ref 11.7–15.7)
HOLD SPECIMEN: NORMAL
IMM GRANULOCYTES # BLD: 0.1 10E3/UL
IMM GRANULOCYTES NFR BLD: 1 %
LYMPHOCYTES # BLD AUTO: 1.2 10E3/UL (ref 0.8–5.3)
LYMPHOCYTES NFR BLD AUTO: 9 %
MCH RBC QN AUTO: 28.7 PG (ref 26.5–33)
MCH RBC QN AUTO: 28.9 PG (ref 26.5–33)
MCH RBC QN AUTO: 28.9 PG (ref 26.5–33)
MCHC RBC AUTO-ENTMCNC: 32.8 G/DL (ref 31.5–36.5)
MCHC RBC AUTO-ENTMCNC: 33 G/DL (ref 31.5–36.5)
MCHC RBC AUTO-ENTMCNC: 33.2 G/DL (ref 31.5–36.5)
MCV RBC AUTO: 87 FL (ref 78–100)
MCV RBC AUTO: 87 FL (ref 78–100)
MCV RBC AUTO: 88 FL (ref 78–100)
MONOCYTES # BLD AUTO: 0.5 10E3/UL (ref 0–1.3)
MONOCYTES NFR BLD AUTO: 4 %
NEUTROPHILS # BLD AUTO: 10.8 10E3/UL (ref 1.6–8.3)
NEUTROPHILS NFR BLD AUTO: 84 %
NRBC # BLD AUTO: 0 10E3/UL
NRBC BLD AUTO-RTO: 0 /100
PLATELET # BLD AUTO: 161 10E3/UL (ref 150–450)
PLATELET # BLD AUTO: 180 10E3/UL (ref 150–450)
PLATELET # BLD AUTO: 186 10E3/UL (ref 150–450)
POTASSIUM SERPL-SCNC: 3.7 MMOL/L (ref 3.4–5.3)
POTASSIUM SERPL-SCNC: 3.9 MMOL/L (ref 3.4–5.3)
PROT SERPL-MCNC: 7.2 G/DL (ref 6.4–8.3)
RBC # BLD AUTO: 4.77 10E6/UL (ref 3.8–5.2)
RBC # BLD AUTO: 4.85 10E6/UL (ref 3.8–5.2)
RBC # BLD AUTO: 5.3 10E6/UL (ref 3.8–5.2)
SODIUM SERPL-SCNC: 137 MMOL/L (ref 136–145)
SODIUM SERPL-SCNC: 138 MMOL/L (ref 136–145)
TROPONIN T SERPL HS-MCNC: 101 NG/L
TROPONIN T SERPL HS-MCNC: 58 NG/L
TROPONIN T SERPL HS-MCNC: 58 NG/L
TROPONIN T SERPL HS-MCNC: 66 NG/L
TROPONIN T SERPL HS-MCNC: 83 NG/L
TROPONIN T SERPL HS-MCNC: 83 NG/L
WBC # BLD AUTO: 11.8 10E3/UL (ref 4–11)
WBC # BLD AUTO: 12.4 10E3/UL (ref 4–11)
WBC # BLD AUTO: 12.9 10E3/UL (ref 4–11)

## 2023-09-24 PROCEDURE — 85014 HEMATOCRIT: CPT | Performed by: EMERGENCY MEDICINE

## 2023-09-24 PROCEDURE — 85730 THROMBOPLASTIN TIME PARTIAL: CPT | Performed by: INTERNAL MEDICINE

## 2023-09-24 PROCEDURE — 36415 COLL VENOUS BLD VENIPUNCTURE: CPT | Performed by: EMERGENCY MEDICINE

## 2023-09-24 PROCEDURE — 36415 COLL VENOUS BLD VENIPUNCTURE: CPT | Performed by: FAMILY MEDICINE

## 2023-09-24 PROCEDURE — 250N000013 HC RX MED GY IP 250 OP 250 PS 637: Performed by: FAMILY MEDICINE

## 2023-09-24 PROCEDURE — 250N000009 HC RX 250: Performed by: EMERGENCY MEDICINE

## 2023-09-24 PROCEDURE — 85025 COMPLETE CBC W/AUTO DIFF WBC: CPT | Performed by: EMERGENCY MEDICINE

## 2023-09-24 PROCEDURE — 36415 COLL VENOUS BLD VENIPUNCTURE: CPT | Performed by: NURSE PRACTITIONER

## 2023-09-24 PROCEDURE — 84484 ASSAY OF TROPONIN QUANT: CPT | Performed by: EMERGENCY MEDICINE

## 2023-09-24 PROCEDURE — 80053 COMPREHEN METABOLIC PANEL: CPT | Performed by: EMERGENCY MEDICINE

## 2023-09-24 PROCEDURE — 99222 1ST HOSP IP/OBS MODERATE 55: CPT | Mod: 25 | Performed by: INTERNAL MEDICINE

## 2023-09-24 PROCEDURE — 250N000011 HC RX IP 250 OP 636: Performed by: FAMILY MEDICINE

## 2023-09-24 PROCEDURE — 83036 HEMOGLOBIN GLYCOSYLATED A1C: CPT | Performed by: INTERNAL MEDICINE

## 2023-09-24 PROCEDURE — 258N000003 HC RX IP 258 OP 636: Performed by: NURSE PRACTITIONER

## 2023-09-24 PROCEDURE — 84484 ASSAY OF TROPONIN QUANT: CPT | Performed by: NURSE PRACTITIONER

## 2023-09-24 PROCEDURE — 250N000011 HC RX IP 250 OP 636: Performed by: EMERGENCY MEDICINE

## 2023-09-24 PROCEDURE — 250N000013 HC RX MED GY IP 250 OP 250 PS 637: Performed by: INTERNAL MEDICINE

## 2023-09-24 PROCEDURE — 85730 THROMBOPLASTIN TIME PARTIAL: CPT | Performed by: NURSE PRACTITIONER

## 2023-09-24 PROCEDURE — 85027 COMPLETE CBC AUTOMATED: CPT | Performed by: NURSE PRACTITIONER

## 2023-09-24 PROCEDURE — 85730 THROMBOPLASTIN TIME PARTIAL: CPT | Performed by: EMERGENCY MEDICINE

## 2023-09-24 PROCEDURE — 84484 ASSAY OF TROPONIN QUANT: CPT | Performed by: INTERNAL MEDICINE

## 2023-09-24 PROCEDURE — 99222 1ST HOSP IP/OBS MODERATE 55: CPT | Performed by: NURSE PRACTITIONER

## 2023-09-24 PROCEDURE — 210N000002 HC R&B HEART CARE

## 2023-09-24 PROCEDURE — 74177 CT ABD & PELVIS W/CONTRAST: CPT

## 2023-09-24 PROCEDURE — 250N000011 HC RX IP 250 OP 636: Mod: JZ | Performed by: EMERGENCY MEDICINE

## 2023-09-24 PROCEDURE — 250N000011 HC RX IP 250 OP 636: Mod: JZ | Performed by: NURSE PRACTITIONER

## 2023-09-24 PROCEDURE — 36415 COLL VENOUS BLD VENIPUNCTURE: CPT | Performed by: INTERNAL MEDICINE

## 2023-09-24 PROCEDURE — 84484 ASSAY OF TROPONIN QUANT: CPT | Performed by: FAMILY MEDICINE

## 2023-09-24 PROCEDURE — 120N000002 HC R&B MED SURG/OB UMMC

## 2023-09-24 PROCEDURE — 250N000013 HC RX MED GY IP 250 OP 250 PS 637: Performed by: NURSE PRACTITIONER

## 2023-09-24 PROCEDURE — 250N000013 HC RX MED GY IP 250 OP 250 PS 637: Performed by: EMERGENCY MEDICINE

## 2023-09-24 RX ORDER — NALOXONE HYDROCHLORIDE 0.4 MG/ML
0.2 INJECTION, SOLUTION INTRAMUSCULAR; INTRAVENOUS; SUBCUTANEOUS
Status: DISCONTINUED | OUTPATIENT
Start: 2023-09-24 | End: 2023-09-26

## 2023-09-24 RX ORDER — ACETAMINOPHEN 325 MG/1
650 TABLET ORAL EVERY 4 HOURS PRN
Status: DISCONTINUED | OUTPATIENT
Start: 2023-09-24 | End: 2023-09-25

## 2023-09-24 RX ORDER — ASPIRIN 325 MG
325 TABLET ORAL ONCE
Status: COMPLETED | OUTPATIENT
Start: 2023-09-24 | End: 2023-09-24

## 2023-09-24 RX ORDER — NALOXONE HYDROCHLORIDE 0.4 MG/ML
0.4 INJECTION, SOLUTION INTRAMUSCULAR; INTRAVENOUS; SUBCUTANEOUS
Status: DISCONTINUED | OUTPATIENT
Start: 2023-09-24 | End: 2023-09-26

## 2023-09-24 RX ORDER — LIDOCAINE 40 MG/G
CREAM TOPICAL
Status: DISCONTINUED | OUTPATIENT
Start: 2023-09-24 | End: 2023-09-26 | Stop reason: HOSPADM

## 2023-09-24 RX ORDER — BISACODYL 10 MG
10 SUPPOSITORY, RECTAL RECTAL DAILY PRN
Status: DISCONTINUED | OUTPATIENT
Start: 2023-09-24 | End: 2023-09-26 | Stop reason: HOSPADM

## 2023-09-24 RX ORDER — ONDANSETRON 4 MG/1
4 TABLET, ORALLY DISINTEGRATING ORAL EVERY 6 HOURS PRN
Status: DISCONTINUED | OUTPATIENT
Start: 2023-09-24 | End: 2023-09-26 | Stop reason: HOSPADM

## 2023-09-24 RX ORDER — NITROGLYCERIN 0.4 MG/1
0.4 TABLET SUBLINGUAL EVERY 5 MIN PRN
Status: DISCONTINUED | OUTPATIENT
Start: 2023-09-24 | End: 2023-09-26 | Stop reason: HOSPADM

## 2023-09-24 RX ORDER — PROCHLORPERAZINE 25 MG
25 SUPPOSITORY, RECTAL RECTAL EVERY 12 HOURS PRN
Status: DISCONTINUED | OUTPATIENT
Start: 2023-09-24 | End: 2023-09-26 | Stop reason: HOSPADM

## 2023-09-24 RX ORDER — OXYCODONE HYDROCHLORIDE 5 MG/1
5-10 TABLET ORAL
Status: DISCONTINUED | OUTPATIENT
Start: 2023-09-24 | End: 2023-09-26

## 2023-09-24 RX ORDER — CLOPIDOGREL 300 MG/1
600 TABLET, FILM COATED ORAL ONCE
Status: CANCELLED | OUTPATIENT
Start: 2023-09-24 | End: 2023-09-24

## 2023-09-24 RX ORDER — HEPARIN SODIUM 10000 [USP'U]/100ML
0-5000 INJECTION, SOLUTION INTRAVENOUS CONTINUOUS
Status: CANCELLED | OUTPATIENT
Start: 2023-09-24

## 2023-09-24 RX ORDER — MORPHINE SULFATE 2 MG/ML
2 INJECTION, SOLUTION INTRAMUSCULAR; INTRAVENOUS ONCE
Status: COMPLETED | OUTPATIENT
Start: 2023-09-24 | End: 2023-09-24

## 2023-09-24 RX ORDER — ONDANSETRON 2 MG/ML
4 INJECTION INTRAMUSCULAR; INTRAVENOUS ONCE
Status: COMPLETED | OUTPATIENT
Start: 2023-09-24 | End: 2023-09-24

## 2023-09-24 RX ORDER — ONDANSETRON 2 MG/ML
4 INJECTION INTRAMUSCULAR; INTRAVENOUS EVERY 6 HOURS PRN
Status: DISCONTINUED | OUTPATIENT
Start: 2023-09-24 | End: 2023-09-26 | Stop reason: HOSPADM

## 2023-09-24 RX ORDER — AMOXICILLIN 250 MG
2 CAPSULE ORAL 2 TIMES DAILY
Status: DISCONTINUED | OUTPATIENT
Start: 2023-09-24 | End: 2023-09-26 | Stop reason: HOSPADM

## 2023-09-24 RX ORDER — ASPIRIN 81 MG/1
81 TABLET, CHEWABLE ORAL DAILY
Status: CANCELLED | OUTPATIENT
Start: 2023-09-25

## 2023-09-24 RX ORDER — ASPIRIN 81 MG/1
324 TABLET, CHEWABLE ORAL ONCE
Status: CANCELLED | OUTPATIENT
Start: 2023-09-24 | End: 2023-09-24

## 2023-09-24 RX ORDER — DEXTROSE MONOHYDRATE 50 MG/ML
INJECTION, SOLUTION INTRAVENOUS CONTINUOUS
Status: DISCONTINUED | OUTPATIENT
Start: 2023-09-24 | End: 2023-09-25

## 2023-09-24 RX ORDER — NITROGLYCERIN 0.4 MG/1
0.4 TABLET SUBLINGUAL EVERY 5 MIN PRN
Status: DISCONTINUED | OUTPATIENT
Start: 2023-09-24 | End: 2023-09-24 | Stop reason: HOSPADM

## 2023-09-24 RX ORDER — PROCHLORPERAZINE MALEATE 10 MG
10 TABLET ORAL EVERY 6 HOURS PRN
Status: DISCONTINUED | OUTPATIENT
Start: 2023-09-24 | End: 2023-09-26 | Stop reason: HOSPADM

## 2023-09-24 RX ORDER — POLYETHYLENE GLYCOL 3350 17 G/17G
17 POWDER, FOR SOLUTION ORAL DAILY
Status: DISCONTINUED | OUTPATIENT
Start: 2023-09-24 | End: 2023-09-26 | Stop reason: HOSPADM

## 2023-09-24 RX ORDER — POLYETHYLENE GLYCOL 3350 17 G/17G
17 POWDER, FOR SOLUTION ORAL DAILY PRN
Status: DISCONTINUED | OUTPATIENT
Start: 2023-09-24 | End: 2023-09-26 | Stop reason: HOSPADM

## 2023-09-24 RX ORDER — AMOXICILLIN 250 MG
1 CAPSULE ORAL 2 TIMES DAILY
Status: DISCONTINUED | OUTPATIENT
Start: 2023-09-24 | End: 2023-09-26 | Stop reason: HOSPADM

## 2023-09-24 RX ORDER — ASPIRIN 81 MG/1
81 TABLET, CHEWABLE ORAL DAILY
Status: DISCONTINUED | OUTPATIENT
Start: 2023-09-25 | End: 2023-09-26 | Stop reason: HOSPADM

## 2023-09-24 RX ORDER — ACETAMINOPHEN 325 MG/1
975 TABLET ORAL ONCE
Status: COMPLETED | OUTPATIENT
Start: 2023-09-24 | End: 2023-09-24

## 2023-09-24 RX ORDER — CLOPIDOGREL BISULFATE 75 MG/1
75 TABLET ORAL DAILY
Status: CANCELLED | OUTPATIENT
Start: 2023-09-25

## 2023-09-24 RX ORDER — HEPARIN SODIUM 10000 [USP'U]/100ML
0-5000 INJECTION, SOLUTION INTRAVENOUS CONTINUOUS
Status: DISCONTINUED | OUTPATIENT
Start: 2023-09-24 | End: 2023-09-24 | Stop reason: HOSPADM

## 2023-09-24 RX ORDER — METOPROLOL TARTRATE 25 MG/1
25 TABLET, FILM COATED ORAL 2 TIMES DAILY
Status: DISCONTINUED | OUTPATIENT
Start: 2023-09-24 | End: 2023-09-26 | Stop reason: HOSPADM

## 2023-09-24 RX ORDER — NITROGLYCERIN 20 MG/100ML
10-200 INJECTION INTRAVENOUS CONTINUOUS
Status: DISCONTINUED | OUTPATIENT
Start: 2023-09-24 | End: 2023-09-24 | Stop reason: HOSPADM

## 2023-09-24 RX ORDER — HYDROMORPHONE HYDROCHLORIDE 1 MG/ML
.3-.5 INJECTION, SOLUTION INTRAMUSCULAR; INTRAVENOUS; SUBCUTANEOUS
Status: DISCONTINUED | OUTPATIENT
Start: 2023-09-24 | End: 2023-09-26 | Stop reason: HOSPADM

## 2023-09-24 RX ORDER — IOPAMIDOL 755 MG/ML
100 INJECTION, SOLUTION INTRAVASCULAR ONCE
Status: COMPLETED | OUTPATIENT
Start: 2023-09-24 | End: 2023-09-24

## 2023-09-24 RX ORDER — ACETAMINOPHEN 650 MG/1
650 SUPPOSITORY RECTAL EVERY 4 HOURS PRN
Status: DISCONTINUED | OUTPATIENT
Start: 2023-09-24 | End: 2023-09-26 | Stop reason: HOSPADM

## 2023-09-24 RX ORDER — MAGNESIUM HYDROXIDE/ALUMINUM HYDROXICE/SIMETHICONE 120; 1200; 1200 MG/30ML; MG/30ML; MG/30ML
30 SUSPENSION ORAL EVERY 4 HOURS PRN
Status: DISCONTINUED | OUTPATIENT
Start: 2023-09-24 | End: 2023-09-26 | Stop reason: HOSPADM

## 2023-09-24 RX ORDER — LISINOPRIL 10 MG/1
10 TABLET ORAL DAILY
Status: CANCELLED | OUTPATIENT
Start: 2023-09-24

## 2023-09-24 RX ORDER — ATORVASTATIN CALCIUM 40 MG/1
40 TABLET, FILM COATED ORAL DAILY
Status: CANCELLED | OUTPATIENT
Start: 2023-09-24

## 2023-09-24 RX ORDER — ATORVASTATIN CALCIUM 40 MG/1
40 TABLET, FILM COATED ORAL DAILY
Status: DISCONTINUED | OUTPATIENT
Start: 2023-09-24 | End: 2023-09-26 | Stop reason: HOSPADM

## 2023-09-24 RX ORDER — NITROGLYCERIN 20 MG/100ML
10-200 INJECTION INTRAVENOUS CONTINUOUS
Status: CANCELLED | OUTPATIENT
Start: 2023-09-24

## 2023-09-24 RX ORDER — ACETAMINOPHEN 500 MG
1000 TABLET ORAL ONCE
Status: COMPLETED | OUTPATIENT
Start: 2023-09-24 | End: 2023-09-24

## 2023-09-24 RX ORDER — HEPARIN SODIUM 10000 [USP'U]/100ML
0-5000 INJECTION, SOLUTION INTRAVENOUS CONTINUOUS
Status: DISCONTINUED | OUTPATIENT
Start: 2023-09-24 | End: 2023-09-26

## 2023-09-24 RX ADMIN — HEPARIN SODIUM AND DEXTROSE 1200 UNITS/HR: 10000; 5 INJECTION INTRAVENOUS at 03:47

## 2023-09-24 RX ADMIN — NITROGLYCERIN 10 MCG/MIN: 20 INJECTION INTRAVENOUS at 03:53

## 2023-09-24 RX ADMIN — NITROGLYCERIN 0.4 MG: 0.4 TABLET SUBLINGUAL at 01:20

## 2023-09-24 RX ADMIN — ACETAMINOPHEN 1000 MG: 500 TABLET ORAL at 08:07

## 2023-09-24 RX ADMIN — MORPHINE SULFATE 2 MG: 2 INJECTION, SOLUTION INTRAMUSCULAR; INTRAVENOUS at 09:57

## 2023-09-24 RX ADMIN — IOPAMIDOL 72 ML: 755 INJECTION, SOLUTION INTRAVENOUS at 01:31

## 2023-09-24 RX ADMIN — SODIUM CHLORIDE 75 ML: 9 INJECTION, SOLUTION INTRAVENOUS at 01:32

## 2023-09-24 RX ADMIN — ATORVASTATIN CALCIUM 40 MG: 40 TABLET, FILM COATED ORAL at 17:26

## 2023-09-24 RX ADMIN — ASPIRIN 325 MG ORAL TABLET 325 MG: 325 PILL ORAL at 17:49

## 2023-09-24 RX ADMIN — NITROGLYCERIN 20 MCG/MIN: 20 INJECTION INTRAVENOUS at 04:28

## 2023-09-24 RX ADMIN — SENNOSIDES AND DOCUSATE SODIUM 1 TABLET: 50; 8.6 TABLET ORAL at 13:01

## 2023-09-24 RX ADMIN — HYDROMORPHONE HYDROCHLORIDE 0.5 MG: 1 INJECTION, SOLUTION INTRAMUSCULAR; INTRAVENOUS; SUBCUTANEOUS at 13:01

## 2023-09-24 RX ADMIN — SENNOSIDES AND DOCUSATE SODIUM 1 TABLET: 50; 8.6 TABLET ORAL at 20:51

## 2023-09-24 RX ADMIN — DEXTROSE MONOHYDRATE: 50 INJECTION, SOLUTION INTRAVENOUS at 14:21

## 2023-09-24 RX ADMIN — ONDANSETRON 4 MG: 2 INJECTION INTRAMUSCULAR; INTRAVENOUS at 08:07

## 2023-09-24 RX ADMIN — ONDANSETRON 4 MG: 2 INJECTION INTRAMUSCULAR; INTRAVENOUS at 13:00

## 2023-09-24 RX ADMIN — METOPROLOL TARTRATE 25 MG: 25 TABLET, FILM COATED ORAL at 20:52

## 2023-09-24 RX ADMIN — ACETAMINOPHEN 975 MG: 325 TABLET ORAL at 01:14

## 2023-09-24 ASSESSMENT — ACTIVITIES OF DAILY LIVING (ADL)
ADLS_ACUITY_SCORE: 35

## 2023-09-24 NOTE — ED TRIAGE NOTES
"Sudden onset mid-sternal CP \"heaviness\" that radiates to back around 2130 today. Yesterday had episode of dizziness with diaphoresis and emesis. Hx traumatic aortic tear repair 2018.     Triage Assessment       Row Name 09/23/23 [id number]       Triage Assessment (Adult)    Airway WDL WDL       Respiratory WDL    Respiratory WDL WDL       Skin Circulation/Temperature WDL    Skin Circulation/Temperature WDL WDL       Cardiac WDL    Cardiac WDL X;chest pain       Chest Pain Assessment    Chest Pain Location midsternal    Chest Pain Radiation back    Character aching  \"heavy\"    Duration 2 hours    Precipitating Factors nothing    Alleviating Factors nothing       Peripheral/Neurovascular WDL    Peripheral Neurovascular WDL WDL       Cognitive/Neuro/Behavioral WDL    Cognitive/Neuro/Behavioral WDL WDL                    "
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
## 2023-09-24 NOTE — PROGRESS NOTES
Patient is A/O x 4, vss, a-febrile, denies chest pain, up independently to the bathroom, voiding adequately, tolerating regular diet, tele SR, patient on Heparin gtt at 1500 units, next PTT at 2359, NPO after midnight for possible angiogram tomorrow.
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
## 2023-09-24 NOTE — PHARMACY-ADMISSION MEDICATION HISTORY
Pharmacist Admission Medication History    Admission medication history is complete. The information provided in this note is only as accurate as the sources available at the time of the update.    Medication reconciliation/reorder completed by provider prior to medication history? No    Information Source(s): Patient and CareEverywhere/SureScripts via in-person    Pertinent Information: none    Changes made to PTA medication list:  Added: None  Deleted: nystatin prn  Changed: None    Medication Affordability:  Not including over the counter (OTC) medications, was there a time in the past 3 months when you did not take your medications as prescribed because of cost?: No    Allergies reviewed with patient and updates made in EHR: yes    Medication History Completed By: Anne Angeles RP 9/24/2023 12:57 PM    Prior to Admission medications    Medication Sig Last Dose Taking? Auth Provider Long Term End Date   acetaminophen (TYLENOL) 500 MG tablet Take 500-1,000 mg by mouth every 6 hours as needed for mild pain  at prn Yes Reported, Patient     levonorgestrel (MIRENA) 20 MCG/24HR IUD 1 each by Intrauterine route once   Yes Gina Hooks MD Yes    XARELTO ANTICOAGULANT 20 MG TABS tablet TAKE 1 TABLET BY MOUTH 1 TIME DAILY WITH DINNER 9/22/2023 at ~2200 Yes Yrn Lopez MD Yes

## 2023-09-24 NOTE — ED PROVIDER NOTES
Emergency Department Patient Sign-out       Brief HPI:  This is a 48 year old female signed out to me by Dr. Long .  See initial ED Provider note for details of the presentation.            Significant Events prior to my assuming care: 48-year-old female who presented with dizziness and chest pain.  Was noted to have elevated troponin without significant EKG change consistent with NSTEMI.  Started on heparin and nitro and is admitted to the cardiology service, boarding in the [facility] while awaiting available bed placement.      Exam:   Patient Vitals for the past 24 hrs:   BP Temp Temp src Pulse Resp SpO2 Weight   09/24/23 0448 118/77 97.6  F (36.4  C) Oral 88 17 96 % --   09/24/23 0443 120/72 -- -- 83 14 93 % --   09/24/23 0438 118/85 -- -- 87 15 95 % --   09/24/23 0433 122/83 -- -- 93 19 98 % --   09/24/23 0414 126/73 -- -- 82 17 94 % --   09/24/23 0401 136/82 -- -- 84 16 97 % --   09/24/23 0356 125/71 -- -- 83 17 95 % --   09/24/23 0351 124/71 -- -- 82 18 95 % --   09/24/23 0118 122/72 -- -- 82 15 99 % --   09/24/23 0008 -- -- -- -- -- -- 149.7 kg (330 lb)   09/23/23 2338 (!) 139/101 98.4  F (36.9  C) Oral 93 18 99 % --       EXAM:  HEENT: Normal.  Oropharynx clear and moist.  Neck: Supple, trachea midline, normal voice  Chest:  No respiratory distress, speaks in complete sentences, chest wall nontender, lungs clear in all fields  CV: Regular rate and rhythm, no murmur, normal pulse, no jugular venous distention  Abdomen: Nondistended, soft nontender.  No hepatosplenomegaly.  Extremities: No edema or tenderness        ED RESULTS:   Results for orders placed or performed during the hospital encounter of 09/23/23 (from the past 24 hour(s))   EKG 12 lead     Status: None (Preliminary result)    Collection Time: 09/23/23 11:50 PM   Result Value Ref Range    Systolic Blood Pressure  mmHg    Diastolic Blood Pressure  mmHg    Ventricular Rate 84 BPM    Atrial Rate 84 BPM    CA Interval 156 ms    QRS Duration 78  ms     ms    QTc 434 ms    P Axis 45 degrees    R AXIS 21 degrees    T Axis 46 degrees    Interpretation ECG Sinus rhythm  Normal ECG      [place] Draw     Status: None    Collection Time: 09/24/23 12:04 AM    Narrative    The following orders were created for panel order [place] Draw.  Procedure                               Abnormality         Status                     ---------                               -----------         ------                     Extra Blue Top Tube[[id number]]                              Final result               Extra Red Top Tube[[id number]]                               Final result               Extra Green Top (Lithium...[[id number]]                      Final result               Extra Purple Top Tube[[id number]]                            Final result                 Please view results for these tests on the individual orders.   Extra Blue Top Tube     Status: None    Collection Time: 09/24/23 12:04 AM   Result Value Ref Range    Hold Specimen JIC    Extra Red Top Tube     Status: None    Collection Time: 09/24/23 12:04 AM   Result Value Ref Range    Hold Specimen JIC    Extra Green Top (Lithium Heparin) Tube     Status: None    Collection Time: 09/24/23 12:04 AM   Result Value Ref Range    Hold Specimen JIC    Extra Purple Top Tube     Status: None    Collection Time: 09/24/23 12:04 AM   Result Value Ref Range    Hold Specimen JIC    CBC with platelets differential     Status: Abnormal    Collection Time: 09/24/23 12:04 AM    Narrative    The following orders were created for panel order CBC with platelets differential.  Procedure                               Abnormality         Status                     ---------                               -----------         ------                     CBC with platelets and d...[[id number]]  Abnormal            Final result                 Please view results for these tests on the individual orders.   Comprehensive metabolic panel      Status: Abnormal    Collection Time: 09/24/23 12:04 AM   Result Value Ref Range    Sodium 138 136 - 145 mmol/L    Potassium 3.9 3.4 - 5.3 mmol/L    Chloride 103 98 - 107 mmol/L    Carbon Dioxide (CO2) 20 (L) 22 - 29 mmol/L    Anion Gap 15 7 - 15 mmol/L    Urea Nitrogen 13.1 6.0 - 20.0 mg/dL    Creatinine 0.77 0.51 - 0.95 mg/dL    Calcium 9.5 8.6 - 10.0 mg/dL    Glucose 130 (H) 70 - 99 mg/dL    Alkaline Phosphatase 77 35 - 104 U/L    AST 20 0 - 45 U/L    ALT 21 0 - 50 U/L    Protein Total 7.2 6.4 - 8.3 g/dL    Albumin 4.5 3.5 - 5.2 g/dL    Bilirubin Total 0.6 <=1.2 mg/dL    GFR Estimate >90 >60 mL/min/1.73m2   Troponin T, High Sensitivity     Status: Abnormal    Collection Time: 09/24/23 12:04 AM   Result Value Ref Range    Troponin T, High Sensitivity 58 (H) <=14 ng/L   CBC with platelets and differential     Status: Abnormal    Collection Time: 09/24/23 12:04 AM   Result Value Ref Range    WBC Count 12.9 (H) 4.0 - 11.0 10e3/uL    RBC Count 5.30 (H) 3.80 - 5.20 10e6/uL    Hemoglobin 15.3 11.7 - 15.7 g/dL    Hematocrit 46.7 35.0 - 47.0 %    MCV 88 78 - 100 fL    MCH 28.9 26.5 - 33.0 pg    MCHC 32.8 31.5 - 36.5 g/dL    RDW 12.4 10.0 - 15.0 %    Platelet Count 180 150 - 450 10e3/uL    % Neutrophils 84 %    % Lymphocytes 9 %    % Monocytes 4 %    % Eosinophils 2 %    % Basophils 0 %    % Immature Granulocytes 1 %    NRBCs per 100 WBC 0 <1 /100    Absolute Neutrophils 10.8 (H) 1.6 - 8.3 10e3/uL    Absolute Lymphocytes 1.2 0.8 - 5.3 10e3/uL    Absolute Monocytes 0.5 0.0 - 1.3 10e3/uL    Absolute Eosinophils 0.2 0.0 - 0.7 10e3/uL    Absolute Basophils 0.0 0.0 - 0.2 10e3/uL    Absolute Immature Granulocytes 0.1 <=0.4 10e3/uL    Absolute NRBCs 0.0 10e3/uL   CT Aortic Survey w Contrast     Status: None    Collection Time: 09/24/23  1:55 AM    Narrative    EXAM: CT AORTIC SURVEY W CONTRAST  LOCATION: [facility]  DATE: 9/24/2023    INDICATION: Chest pain. Previous traumatic aortic  dissection with repair  COMPARISON: None.  TECHNIQUE: CT angiogram chest abdomen pelvis during arterial phase of injection of IV contrast. 2D and 3D MIP reconstructions were performed by the CT technologist. Dose reduction techniques were used.   CONTRAST: 72 mL's iso 370    FINDINGS:   CT ANGIOGRAM CHEST, ABDOMEN, AND PELVIS: No aortic wall thickening/hematoma. An endoluminal stent graft extends from the aortic arch to the mid descending thoracic aorta. No recurrent dissection. Mesenteric, bilateral renal and bilateral iliofemoral   arteries are fully patent.    LUNGS AND PLEURA: No lymphadenopathy. Heart size is normal without pericardial effusion. A small hiatal hernia is stable.    MEDIASTINUM/AXILLAE: No lymphadenopathy. Heart size is normal without pericardial effusion. A small hiatal hernia is unchanged.    CORONARY ARTERY CALCIFICATION: None.    HEPATOBILIARY: Normal liver. A large gallstone is present without acute cholecystitis.    PANCREAS: Normal.    SPLEEN: Mild nonspecific splenomegaly measuring 15.8 cm AP. No focal lesions.    ADRENAL GLANDS: Normal.    KIDNEYS/BLADDER: Multifocal bilateral renal cortical scarring have developed, sequela of prior bilateral renal infarcts. No hydronephrosis.    BOWEL: Scattered diverticula in the colon without acute vasculitis. No focal bowel thickening or obstruction. Appendix is normal.    LYMPH NODES: Normal.    PELVIC ORGANS: Normal. An IUD is in appropriate position.    MUSCULOSKELETAL: Scattered mild degenerative endplate changes in the lower thoracic spine, with multilevel mild degenerative vacuum disc in the lower thoracic and lumbar spine. No suspicious osseous lesions or acute fractures.        Impression    IMPRESSION:    1.  No acute process in the chest, abdomen and pelvis, including acute vascular findings such as aortic wall hematoma or recurrent aortic dissection.    2.  Interval development of multiple bilateral renal cortical scars, sequela of prior  bilateral renal infarcts.    3.  Redemonstration of cholelithiasis, small hiatal hernia and mild colonic diverticulosis.     Troponin T, High Sensitivity     Status: Abnormal    Collection Time: 09/24/23  2:01 AM   Result Value Ref Range    Troponin T, High Sensitivity 66 (H) <=14 ng/L   CBC with platelets     Status: Abnormal    Collection Time: 09/24/23  3:04 AM   Result Value Ref Range    WBC Count 12.4 (H) 4.0 - 11.0 10e3/uL    RBC Count 4.85 3.80 - 5.20 10e6/uL    Hemoglobin 14.0 11.7 - 15.7 g/dL    Hematocrit 42.2 35.0 - 47.0 %    MCV 87 78 - 100 fL    MCH 28.9 26.5 - 33.0 pg    MCHC 33.2 31.5 - 36.5 g/dL    RDW 12.5 10.0 - 15.0 %    Platelet Count 186 150 - 450 10e3/uL       ED MEDICATIONS:   Medications   nitroGLYcerin (NITROSTAT) sublingual tablet 0.4 mg (0.4 mg Sublingual $Given 9/24/23 0120)   nitroGLYcerin 50 mg in D5W 250 mL (adult std) infusion CENTRAL (20 mcg/min Intravenous $New Bag 9/24/23 0428)   heparin infusion 25,000 units in D5W 250 mL ANTICOAGULANT ( Intravenous Not Given 9/24/23 0405)   acetaminophen (TYLENOL) tablet 975 mg (975 mg Oral $Given 9/24/23 0114)   sodium chloride 0.9 % bag 500mL for CT scan flush use (75 mLs As instructed $Given 9/24/23 0132)   iopamidol (ISOVUE-370) solution 100 mL (72 mLs Intravenous $Given 9/24/23 0131)   heparin ANTICOAGULANT loading dose for LOW INTENSITY TREATMENT * Give BEFORE starting heparin infusion (6,000 Units Intravenous $Given 9/24/23 0346)         Impression:    ICD-10-CM    1. NSTEMI (non-ST elevated myocardial infarction) (H)  I21.4           Plan:    Pending studies include none.  We are awaiting bed placement on the [facility], continue current treatment.    Patient reports feeling some improvement, no current chest pain.  Third troponin was obtained and stable.  Patient is frustrated as there are no beds available at the [facility] throughout the day today.  They request transfer to [facility].  I was able to reach out to  the hospitalist at [facility] who will accept the patient in transfer.  Patient appears stable for transfer on heparin and nitro.    MD Deangelo Mancia David, MD  09/24/23 7027

## 2023-09-25 ENCOUNTER — APPOINTMENT (OUTPATIENT)
Dept: CARDIOLOGY | Facility: CLINIC | Age: 48
DRG: 281 | End: 2023-09-25
Attending: NURSE PRACTITIONER
Payer: COMMERCIAL

## 2023-09-25 ENCOUNTER — APPOINTMENT (OUTPATIENT)
Dept: CT IMAGING | Facility: CLINIC | Age: 48
DRG: 281 | End: 2023-09-25
Attending: INTERNAL MEDICINE
Payer: COMMERCIAL

## 2023-09-25 LAB
ANION GAP SERPL CALCULATED.3IONS-SCNC: 12 MMOL/L (ref 7–15)
APTT PPP: 51 SECONDS (ref 22–38)
APTT PPP: 52 SECONDS (ref 22–38)
APTT PPP: 67 SECONDS (ref 22–38)
ATRIAL RATE - MUSE: 0 BPM
ATRIAL RATE - MUSE: 84 BPM
ATRIAL RATE - MUSE: 86 BPM
BUN SERPL-MCNC: 12.7 MG/DL (ref 6–20)
CALCIUM SERPL-MCNC: 8.9 MG/DL (ref 8.6–10)
CHLORIDE SERPL-SCNC: 108 MMOL/L (ref 98–107)
CHOLEST SERPL-MCNC: 168 MG/DL
CREAT SERPL-MCNC: 0.82 MG/DL (ref 0.51–0.95)
CRP SERPL HS-MCNC: 9.63 MG/L
DEPRECATED HCO3 PLAS-SCNC: 20 MMOL/L (ref 22–29)
DIASTOLIC BLOOD PRESSURE - MUSE: NORMAL MMHG
EGFRCR SERPLBLD CKD-EPI 2021: 88 ML/MIN/1.73M2
GLUCOSE SERPL-MCNC: 100 MG/DL (ref 70–99)
HDLC SERPL-MCNC: 34 MG/DL
INTERPRETATION ECG - MUSE: NORMAL
LDLC SERPL CALC-MCNC: 88 MG/DL
LVEF ECHO: NORMAL
NONHDLC SERPL-MCNC: 134 MG/DL
P AXIS - MUSE: 43 DEGREES
P AXIS - MUSE: 45 DEGREES
P AXIS - MUSE: NORMAL DEGREES
POTASSIUM SERPL-SCNC: 3.7 MMOL/L (ref 3.4–5.3)
PR INTERVAL - MUSE: 156 MS
PR INTERVAL - MUSE: 156 MS
PR INTERVAL - MUSE: NORMAL MS
QRS DURATION - MUSE: 0 MS
QRS DURATION - MUSE: 78 MS
QRS DURATION - MUSE: 78 MS
QT - MUSE: 0 MS
QT - MUSE: 368 MS
QT - MUSE: 368 MS
QTC - MUSE: 0 MS
QTC - MUSE: 434 MS
QTC - MUSE: 440 MS
R AXIS - MUSE: 0 DEGREES
R AXIS - MUSE: 21 DEGREES
R AXIS - MUSE: 22 DEGREES
SODIUM SERPL-SCNC: 140 MMOL/L (ref 136–145)
SYSTOLIC BLOOD PRESSURE - MUSE: NORMAL MMHG
T AXIS - MUSE: 0 DEGREES
T AXIS - MUSE: 41 DEGREES
T AXIS - MUSE: 46 DEGREES
TRIGL SERPL-MCNC: 228 MG/DL
VENTRICULAR RATE- MUSE: 0 BPM
VENTRICULAR RATE- MUSE: 84 BPM
VENTRICULAR RATE- MUSE: 86 BPM

## 2023-09-25 PROCEDURE — 93005 ELECTROCARDIOGRAM TRACING: CPT

## 2023-09-25 PROCEDURE — B32T1ZZ COMPUTERIZED TOMOGRAPHY (CT SCAN) OF LEFT PULMONARY ARTERY USING LOW OSMOLAR CONTRAST: ICD-10-PCS | Performed by: RADIOLOGY

## 2023-09-25 PROCEDURE — 85730 THROMBOPLASTIN TIME PARTIAL: CPT | Performed by: INTERNAL MEDICINE

## 2023-09-25 PROCEDURE — 82310 ASSAY OF CALCIUM: CPT | Performed by: INTERNAL MEDICINE

## 2023-09-25 PROCEDURE — 36415 COLL VENOUS BLD VENIPUNCTURE: CPT | Performed by: INTERNAL MEDICINE

## 2023-09-25 PROCEDURE — B32S1ZZ COMPUTERIZED TOMOGRAPHY (CT SCAN) OF RIGHT PULMONARY ARTERY USING LOW OSMOLAR CONTRAST: ICD-10-PCS | Performed by: RADIOLOGY

## 2023-09-25 PROCEDURE — 99232 SBSQ HOSP IP/OBS MODERATE 35: CPT | Mod: FS | Performed by: NURSE PRACTITIONER

## 2023-09-25 PROCEDURE — 250N000011 HC RX IP 250 OP 636: Mod: JZ | Performed by: NURSE PRACTITIONER

## 2023-09-25 PROCEDURE — 71275 CT ANGIOGRAPHY CHEST: CPT

## 2023-09-25 PROCEDURE — 99233 SBSQ HOSP IP/OBS HIGH 50: CPT | Performed by: INTERNAL MEDICINE

## 2023-09-25 PROCEDURE — 80061 LIPID PANEL: CPT | Performed by: NURSE PRACTITIONER

## 2023-09-25 PROCEDURE — 36415 COLL VENOUS BLD VENIPUNCTURE: CPT | Performed by: NURSE PRACTITIONER

## 2023-09-25 PROCEDURE — 250N000011 HC RX IP 250 OP 636: Performed by: INTERNAL MEDICINE

## 2023-09-25 PROCEDURE — 210N000002 HC R&B HEART CARE

## 2023-09-25 PROCEDURE — 250N000009 HC RX 250: Performed by: INTERNAL MEDICINE

## 2023-09-25 PROCEDURE — 250N000013 HC RX MED GY IP 250 OP 250 PS 637: Performed by: NURSE PRACTITIONER

## 2023-09-25 PROCEDURE — 93306 TTE W/DOPPLER COMPLETE: CPT | Mod: 26 | Performed by: INTERNAL MEDICINE

## 2023-09-25 PROCEDURE — 86141 C-REACTIVE PROTEIN HS: CPT | Performed by: NURSE PRACTITIONER

## 2023-09-25 PROCEDURE — 93306 TTE W/DOPPLER COMPLETE: CPT

## 2023-09-25 PROCEDURE — 258N000003 HC RX IP 258 OP 636: Performed by: NURSE PRACTITIONER

## 2023-09-25 PROCEDURE — 250N000013 HC RX MED GY IP 250 OP 250 PS 637: Performed by: INTERNAL MEDICINE

## 2023-09-25 PROCEDURE — 258N000003 HC RX IP 258 OP 636: Performed by: INTERNAL MEDICINE

## 2023-09-25 RX ORDER — LIDOCAINE 40 MG/G
CREAM TOPICAL
Status: DISCONTINUED | OUTPATIENT
Start: 2023-09-25 | End: 2023-09-26 | Stop reason: HOSPADM

## 2023-09-25 RX ORDER — ASPIRIN 325 MG
325 TABLET ORAL ONCE
Status: COMPLETED | OUTPATIENT
Start: 2023-09-25 | End: 2023-09-25

## 2023-09-25 RX ORDER — LORAZEPAM 2 MG/ML
0.5 INJECTION INTRAMUSCULAR
Status: DISCONTINUED | OUTPATIENT
Start: 2023-09-25 | End: 2023-09-26 | Stop reason: HOSPADM

## 2023-09-25 RX ORDER — LORAZEPAM 0.5 MG/1
0.5 TABLET ORAL
Status: DISCONTINUED | OUTPATIENT
Start: 2023-09-25 | End: 2023-09-26 | Stop reason: HOSPADM

## 2023-09-25 RX ORDER — POTASSIUM CHLORIDE 1500 MG/1
20 TABLET, EXTENDED RELEASE ORAL
Status: COMPLETED | OUTPATIENT
Start: 2023-09-25 | End: 2023-09-25

## 2023-09-25 RX ORDER — ASPIRIN 81 MG/1
243 TABLET, CHEWABLE ORAL ONCE
Status: COMPLETED | OUTPATIENT
Start: 2023-09-25 | End: 2023-09-25

## 2023-09-25 RX ORDER — SODIUM CHLORIDE 9 MG/ML
INJECTION, SOLUTION INTRAVENOUS CONTINUOUS
Status: DISCONTINUED | OUTPATIENT
Start: 2023-09-25 | End: 2023-09-26 | Stop reason: HOSPADM

## 2023-09-25 RX ORDER — IOPAMIDOL 755 MG/ML
83 INJECTION, SOLUTION INTRAVASCULAR ONCE
Status: COMPLETED | OUTPATIENT
Start: 2023-09-25 | End: 2023-09-25

## 2023-09-25 RX ORDER — ACETAMINOPHEN 500 MG
500-1000 TABLET ORAL EVERY 6 HOURS PRN
Status: DISCONTINUED | OUTPATIENT
Start: 2023-09-25 | End: 2023-09-26

## 2023-09-25 RX ORDER — SODIUM CHLORIDE 9 MG/ML
INJECTION, SOLUTION INTRAVENOUS CONTINUOUS
Status: DISCONTINUED | OUTPATIENT
Start: 2023-09-25 | End: 2023-09-26

## 2023-09-25 RX ADMIN — DEXTROSE MONOHYDRATE: 50 INJECTION, SOLUTION INTRAVENOUS at 03:11

## 2023-09-25 RX ADMIN — METOPROLOL TARTRATE 25 MG: 25 TABLET, FILM COATED ORAL at 20:29

## 2023-09-25 RX ADMIN — ATORVASTATIN CALCIUM 40 MG: 40 TABLET, FILM COATED ORAL at 09:32

## 2023-09-25 RX ADMIN — METOPROLOL TARTRATE 25 MG: 25 TABLET, FILM COATED ORAL at 09:31

## 2023-09-25 RX ADMIN — HEPARIN SODIUM 1650 UNITS/HR: 10000 INJECTION, SOLUTION INTRAVENOUS at 20:27

## 2023-09-25 RX ADMIN — SODIUM CHLORIDE: 9 INJECTION, SOLUTION INTRAVENOUS at 09:32

## 2023-09-25 RX ADMIN — ASPIRIN 81 MG 81 MG: 81 TABLET ORAL at 09:31

## 2023-09-25 RX ADMIN — ACETAMINOPHEN 500 MG: 500 TABLET, FILM COATED ORAL at 13:10

## 2023-09-25 RX ADMIN — POTASSIUM CHLORIDE 20 MEQ: 1500 TABLET, EXTENDED RELEASE ORAL at 13:39

## 2023-09-25 RX ADMIN — HEPARIN SODIUM 1500 UNITS/HR: 10000 INJECTION, SOLUTION INTRAVENOUS at 06:27

## 2023-09-25 RX ADMIN — SENNOSIDES AND DOCUSATE SODIUM 2 TABLET: 50; 8.6 TABLET ORAL at 09:32

## 2023-09-25 RX ADMIN — SODIUM CHLORIDE: 9 INJECTION, SOLUTION INTRAVENOUS at 20:27

## 2023-09-25 RX ADMIN — SODIUM CHLORIDE 100 ML: 9 INJECTION, SOLUTION INTRAVENOUS at 08:11

## 2023-09-25 RX ADMIN — ACETAMINOPHEN 650 MG: 325 TABLET, FILM COATED ORAL at 06:22

## 2023-09-25 RX ADMIN — ASPIRIN 81 MG 243 MG: 81 TABLET ORAL at 13:37

## 2023-09-25 RX ADMIN — IOPAMIDOL 83 ML: 755 INJECTION, SOLUTION INTRAVENOUS at 08:11

## 2023-09-25 ASSESSMENT — ACTIVITIES OF DAILY LIVING (ADL)
CHANGE_IN_FUNCTIONAL_STATUS_SINCE_ONSET_OF_CURRENT_ILLNESS/INJURY: NO
ADLS_ACUITY_SCORE: 18
DRESSING/BATHING_DIFFICULTY: NO
DOING_ERRANDS_INDEPENDENTLY_DIFFICULTY: NO
ADLS_ACUITY_SCORE: 35
DEPENDENT_IADLS:: INDEPENDENT
ADLS_ACUITY_SCORE: 35
CONCENTRATING,_REMEMBERING_OR_MAKING_DECISIONS_DIFFICULTY: NO
TOILETING_ISSUES: NO
ADLS_ACUITY_SCORE: 35
WEAR_GLASSES_OR_BLIND: NO
DIFFICULTY_EATING/SWALLOWING: NO
WALKING_OR_CLIMBING_STAIRS_DIFFICULTY: NO
ADLS_ACUITY_SCORE: 35
FALL_HISTORY_WITHIN_LAST_SIX_MONTHS: NO
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
## 2023-09-25 NOTE — PLAN OF CARE
A&Ox4, denies CP or SOB. Tele remains SR, VSS on RA. Heparin infusing at 1650 units/hr, recheck at 2100. Echo complete, CT Angio negative for PE. Plan for angiogram this afternoon.

## 2023-09-25 NOTE — PROGRESS NOTES
Brief cardiology progress note: CT PE study negative. Will plan to have patient undergo coronary angiogram today. Risks and benefits of coronary angiogram discussed today including, bleeding, bruising, infection, allergic reaction, kidney damage (including need for dialysis), stroke, heart attack, vascular damage, emergency open heart surgery, up to and including death.  Patient indicates understanding and is agreeable to proceed.      Patient is full code. She does not have a contrast allergy. BMP yesterday with normal renal function. No history of GI bleeding. No plans for upcoming surgeries/procedures that would be a contraindication to DAPT if needed.
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
## 2023-09-25 NOTE — PLAN OF CARE
Neuro- A/Ox4  Most Recent Vitals- Temp: 97.3  F (36.3  C) Temp src: Oral BP: 128/74 Pulse: 73   Resp: 16 SpO2: 96 % O2 Device: None (Room air)   Tele/Cardiac- NSR  Resp- on RA, LS clear  Activity- independent  Pain- PRN Tylenol given for hip pain  Drips- heparin infusing @ 1650 units/hour, D5W infusing @ 75 ml/hr  Drains/Tubes- PIV  Skin- WDL  GI/- WDL  Plan- echo and possible angio today, discharge plan pending  Misc- NPO since 0000    Betty Sagastume RN

## 2023-09-25 NOTE — CONSULTS
Care Management Initial Consult    General Information  Assessment completed with: Patient,    Type of CM/SW Visit: Initial Assessment    Primary Care Provider verified and updated as needed: Yes   Readmission within the last 30 days: no previous admission in last 30 days      Reason for Consult: discharge planning  Advance Care Planning: Advance Care Planning Reviewed: no concerns identified          Communication Assessment  Patient's communication style: spoken language (English or Bilingual)             Cognitive  Cognitive/Neuro/Behavioral: WDL                      Living Environment:   People in home: child(haylee), dependent, spouse  mukul, daughter-14 yr old  Current living Arrangements: house      Able to return to prior arrangements: yes       Family/Social Support:  Care provided by: self  Provides care for: child(haylee)  Marital Status:             Description of Support System: Supportive, Involved         Current Resources:   Patient receiving home care services: No     Community Resources: None  Equipment currently used at home:    Supplies currently used at home: None    Employment/Financial:  Employment Status: employed full-time        Financial Concerns: No concerns identified   Referral to Financial Worker: No       Does the patient's insurance plan have a 3 day qualifying hospital stay waiver?  No    Lifestyle & Psychosocial Needs:  Social Determinants of Health     Food Insecurity: Not on file   Depression: Not at risk (3/27/2023)    PHQ-2     PHQ-2 Score: 1   Housing Stability: Not on file   Tobacco Use: Low Risk  (9/24/2023)    Patient History     Smoking Tobacco Use: Never     Smokeless Tobacco Use: Never     Passive Exposure: Not on file   Financial Resource Strain: Not on file   Alcohol Use: Not on file   Transportation Needs: Not on file   Physical Activity: Not on file   Interpersonal Safety: Not on file   Stress: Not on file   Social Connections: Not on file       Functional  Status:  Prior to admission patient needed assistance:   Dependent ADLs:: Independent  Dependent IADLs:: Independent       Mental Health Status:          Chemical Dependency Status:                Values/Beliefs:  Spiritual, Cultural Beliefs, Hindu Practices, Values that affect care: no               Additional Information:  Writer met with patient and her  Sunny and introduced self and role.  Patient lives in a house with Sunny and their 14 yr old daughter.  Patient is independent with all A/IADLs and works full time.  PCP verified with patient.  Anticipate no needs at discharge.    Amy Fried RN Care Coordinator  [facility]  [phone]

## 2023-09-26 VITALS
HEART RATE: 79 BPM | WEIGHT: 293 LBS | RESPIRATION RATE: 16 BRPM | OXYGEN SATURATION: 95 % | DIASTOLIC BLOOD PRESSURE: 82 MMHG | HEIGHT: 68 IN | TEMPERATURE: 98.2 F | SYSTOLIC BLOOD PRESSURE: 128 MMHG | BODY MASS INDEX: 44.41 KG/M2

## 2023-09-26 LAB
APTT PPP: 99 SECONDS (ref 22–38)
ERYTHROCYTE [DISTWIDTH] IN BLOOD BY AUTOMATED COUNT: 12.5 % (ref 10–15)
HCT VFR BLD AUTO: 37.8 % (ref 35–47)
HGB BLD-MCNC: 12.5 G/DL (ref 11.7–15.7)
MCH RBC QN AUTO: 29.7 PG (ref 26.5–33)
MCHC RBC AUTO-ENTMCNC: 33.1 G/DL (ref 31.5–36.5)
MCV RBC AUTO: 90 FL (ref 78–100)
PLATELET # BLD AUTO: 145 10E3/UL (ref 150–450)
RBC # BLD AUTO: 4.21 10E6/UL (ref 3.8–5.2)
WBC # BLD AUTO: 8.4 10E3/UL (ref 4–11)

## 2023-09-26 PROCEDURE — 250N000011 HC RX IP 250 OP 636: Mod: JZ | Performed by: INTERNAL MEDICINE

## 2023-09-26 PROCEDURE — 250N000013 HC RX MED GY IP 250 OP 250 PS 637: Performed by: NURSE PRACTITIONER

## 2023-09-26 PROCEDURE — 272N000001 HC OR GENERAL SUPPLY STERILE: Performed by: INTERNAL MEDICINE

## 2023-09-26 PROCEDURE — 250N000009 HC RX 250: Performed by: INTERNAL MEDICINE

## 2023-09-26 PROCEDURE — 85027 COMPLETE CBC AUTOMATED: CPT | Performed by: INTERNAL MEDICINE

## 2023-09-26 PROCEDURE — 250N000013 HC RX MED GY IP 250 OP 250 PS 637: Performed by: INTERNAL MEDICINE

## 2023-09-26 PROCEDURE — 99152 MOD SED SAME PHYS/QHP 5/>YRS: CPT | Performed by: INTERNAL MEDICINE

## 2023-09-26 PROCEDURE — 99239 HOSP IP/OBS DSCHRG MGMT >30: CPT | Performed by: INTERNAL MEDICINE

## 2023-09-26 PROCEDURE — 250N000011 HC RX IP 250 OP 636: Mod: JZ | Performed by: NURSE PRACTITIONER

## 2023-09-26 PROCEDURE — C1887 CATHETER, GUIDING: HCPCS | Performed by: INTERNAL MEDICINE

## 2023-09-26 PROCEDURE — 258N000003 HC RX IP 258 OP 636: Performed by: NURSE PRACTITIONER

## 2023-09-26 PROCEDURE — B2111ZZ FLUOROSCOPY OF MULTIPLE CORONARY ARTERIES USING LOW OSMOLAR CONTRAST: ICD-10-PCS | Performed by: INTERNAL MEDICINE

## 2023-09-26 PROCEDURE — 93454 CORONARY ARTERY ANGIO S&I: CPT | Performed by: INTERNAL MEDICINE

## 2023-09-26 PROCEDURE — C1894 INTRO/SHEATH, NON-LASER: HCPCS | Performed by: INTERNAL MEDICINE

## 2023-09-26 PROCEDURE — 85730 THROMBOPLASTIN TIME PARTIAL: CPT | Performed by: INTERNAL MEDICINE

## 2023-09-26 PROCEDURE — 99232 SBSQ HOSP IP/OBS MODERATE 35: CPT | Mod: FS | Performed by: NURSE PRACTITIONER

## 2023-09-26 PROCEDURE — 36415 COLL VENOUS BLD VENIPUNCTURE: CPT | Performed by: INTERNAL MEDICINE

## 2023-09-26 PROCEDURE — 93454 CORONARY ARTERY ANGIO S&I: CPT | Mod: 26 | Performed by: INTERNAL MEDICINE

## 2023-09-26 PROCEDURE — 250N000011 HC RX IP 250 OP 636: Performed by: INTERNAL MEDICINE

## 2023-09-26 RX ORDER — NALOXONE HYDROCHLORIDE 0.4 MG/ML
0.4 INJECTION, SOLUTION INTRAMUSCULAR; INTRAVENOUS; SUBCUTANEOUS
Status: DISCONTINUED | OUTPATIENT
Start: 2023-09-26 | End: 2023-09-26 | Stop reason: HOSPADM

## 2023-09-26 RX ORDER — IOPAMIDOL 755 MG/ML
INJECTION, SOLUTION INTRAVASCULAR
Status: DISCONTINUED | OUTPATIENT
Start: 2023-09-26 | End: 2023-09-26 | Stop reason: HOSPADM

## 2023-09-26 RX ORDER — HEPARIN SODIUM 1000 [USP'U]/ML
INJECTION, SOLUTION INTRAVENOUS; SUBCUTANEOUS
Status: DISCONTINUED | OUTPATIENT
Start: 2023-09-26 | End: 2023-09-26 | Stop reason: HOSPADM

## 2023-09-26 RX ORDER — FENTANYL CITRATE 50 UG/ML
INJECTION, SOLUTION INTRAMUSCULAR; INTRAVENOUS
Status: DISCONTINUED | OUTPATIENT
Start: 2023-09-26 | End: 2023-09-26 | Stop reason: HOSPADM

## 2023-09-26 RX ORDER — VERAPAMIL HYDROCHLORIDE 2.5 MG/ML
INJECTION, SOLUTION INTRAVENOUS
Status: DISCONTINUED | OUTPATIENT
Start: 2023-09-26 | End: 2023-09-26 | Stop reason: HOSPADM

## 2023-09-26 RX ORDER — SODIUM CHLORIDE 9 MG/ML
75 INJECTION, SOLUTION INTRAVENOUS CONTINUOUS
Status: ACTIVE | OUTPATIENT
Start: 2023-09-26 | End: 2023-09-26

## 2023-09-26 RX ORDER — OXYCODONE HYDROCHLORIDE 5 MG/1
10 TABLET ORAL EVERY 4 HOURS PRN
Status: DISCONTINUED | OUTPATIENT
Start: 2023-09-26 | End: 2023-09-26 | Stop reason: HOSPADM

## 2023-09-26 RX ORDER — ATROPINE SULFATE 0.1 MG/ML
0.5 INJECTION INTRAVENOUS
Status: DISCONTINUED | OUTPATIENT
Start: 2023-09-26 | End: 2023-09-26 | Stop reason: HOSPADM

## 2023-09-26 RX ORDER — ACETAMINOPHEN 325 MG/1
650 TABLET ORAL EVERY 4 HOURS PRN
Status: DISCONTINUED | OUTPATIENT
Start: 2023-09-26 | End: 2023-09-26 | Stop reason: HOSPADM

## 2023-09-26 RX ORDER — NITROGLYCERIN 5 MG/ML
VIAL (ML) INTRAVENOUS
Status: DISCONTINUED | OUTPATIENT
Start: 2023-09-26 | End: 2023-09-26 | Stop reason: HOSPADM

## 2023-09-26 RX ORDER — OXYCODONE HYDROCHLORIDE 5 MG/1
5 TABLET ORAL EVERY 4 HOURS PRN
Status: DISCONTINUED | OUTPATIENT
Start: 2023-09-26 | End: 2023-09-26 | Stop reason: HOSPADM

## 2023-09-26 RX ORDER — FLUMAZENIL 0.1 MG/ML
0.2 INJECTION, SOLUTION INTRAVENOUS
Status: DISCONTINUED | OUTPATIENT
Start: 2023-09-26 | End: 2023-09-26 | Stop reason: HOSPADM

## 2023-09-26 RX ORDER — METOPROLOL TARTRATE 25 MG/1
25 TABLET, FILM COATED ORAL 2 TIMES DAILY
Qty: 60 TABLET | Refills: 0 | Status: SHIPPED | OUTPATIENT
Start: 2023-09-26 | End: 2023-10-23

## 2023-09-26 RX ORDER — ASPIRIN 81 MG/1
243 TABLET, CHEWABLE ORAL ONCE
Status: COMPLETED | OUTPATIENT
Start: 2023-09-26 | End: 2023-09-26

## 2023-09-26 RX ORDER — NALOXONE HYDROCHLORIDE 0.4 MG/ML
0.2 INJECTION, SOLUTION INTRAMUSCULAR; INTRAVENOUS; SUBCUTANEOUS
Status: DISCONTINUED | OUTPATIENT
Start: 2023-09-26 | End: 2023-09-26 | Stop reason: HOSPADM

## 2023-09-26 RX ORDER — ATORVASTATIN CALCIUM 40 MG/1
40 TABLET, FILM COATED ORAL DAILY
Qty: 30 TABLET | Refills: 0 | Status: SHIPPED | OUTPATIENT
Start: 2023-09-27 | End: 2023-10-23

## 2023-09-26 RX ORDER — FENTANYL CITRATE 50 UG/ML
25 INJECTION, SOLUTION INTRAMUSCULAR; INTRAVENOUS
Status: DISCONTINUED | OUTPATIENT
Start: 2023-09-26 | End: 2023-09-26 | Stop reason: HOSPADM

## 2023-09-26 RX ADMIN — ASPIRIN 81 MG 243 MG: 81 TABLET ORAL at 11:00

## 2023-09-26 RX ADMIN — ASPIRIN 81 MG 81 MG: 81 TABLET ORAL at 08:41

## 2023-09-26 RX ADMIN — METOPROLOL TARTRATE 25 MG: 25 TABLET, FILM COATED ORAL at 08:41

## 2023-09-26 RX ADMIN — HEPARIN SODIUM 1500 UNITS/HR: 10000 INJECTION, SOLUTION INTRAVENOUS at 09:23

## 2023-09-26 RX ADMIN — SODIUM CHLORIDE 75 ML/HR: 9 INJECTION, SOLUTION INTRAVENOUS at 12:36

## 2023-09-26 RX ADMIN — ATORVASTATIN CALCIUM 40 MG: 40 TABLET, FILM COATED ORAL at 08:42

## 2023-09-26 ASSESSMENT — ACTIVITIES OF DAILY LIVING (ADL)
ADLS_ACUITY_SCORE: 18

## 2023-09-26 NOTE — PROGRESS NOTES
A&O x4. VSS on room air. Tele SR. Denies CP/SOB/pain. Up independently. IVF infusing at 75mL/hr. Heparin infusing at 1800 units, recheck PTT at 0300. NPO at 0000 for angiogram tomorrow.
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
## 2023-09-26 NOTE — PROGRESS NOTES
A&Ox4, denies pain or SOB. Tele remains SR, VSS on RA. Angiogram without intervention. Right radial site WDL, CMS intact. Pt transferred to room 252 with all belonings

## 2023-09-26 NOTE — PROGRESS NOTES
Brief cardiology progress note:    Patient feeling well following coronary angiogram. No intervention done. Right wrist puncture site with mild tenderness and bruising. Patient may resume xarelto tonight pending no issues with arteriotomy site. Will arrange outpatient follow up with a cardiac MRI in 2 weeks with follow up in our clinic in [place].

## 2023-09-26 NOTE — PRE-PROCEDURE
GENERAL PRE-PROCEDURE:     Risks and benefits: Risks, benefits and alternatives were discussed    Patient states understanding of procedure being performed: Yes    Patient's understanding of procedure matches consent: Yes    Procedure consent matches procedure scheduled: Yes    Appropriately NPO:  Yes  Mallampati  :  Grade 3- soft palate visible, posterior pharyngeal wall not visible  Lungs:  Lungs clear with good breath sounds bilaterally  Heart:  Normal heart sounds and rate  History & Physical reviewed:  History and physical reviewed and no updates needed  Statement of review:  I have reviewed the lab findings, diagnostic data, medications, and the plan for sedation

## 2023-09-26 NOTE — PLAN OF CARE
Pt discharged home accompanied by spouse whom provided transportation. Cardiac telemetry monitor removed prior to discharge. PIV removed prior to discharge, site remains C/D/I without bleeding or hematoma. Right radial angio access site remains C/D/I without bleeding or hematoma. CMS intact with normal pulses, arm board in place bandaid applied. Follow up appts and tests (cardiac MRI) scheduled and reviewed with pt. New prescriptions were given to pt, will fill at local pharmacy. Medications were reviewed with pt, plans to purchase a blood pressure/pulse home monitor. Pt voided multiple times prior to discharge and successfully ambulated in the [place]. Pt declines pain or discomfort at the time of discharge. Pt escorted out for discharge via wheelchair transport.

## 2023-09-26 NOTE — PLAN OF CARE
Goal Outcome Evaluation:    Monitor remains Sinus rhythm. /84. MD notified. Pt. Has been painfree. Right radial TR band in place. Site stable. Continue to monitor.

## 2023-09-26 NOTE — PROVIDER NOTIFICATION
MD Notification    Notified Person: MD    Notified Person Name:     Notification Date/Time: 9/26/2023 1440    Notification Interaction: Vocera messaging.    Purpose of Notification: /84. Pt. Is asking if Xarelto will be restarted.    Orders Received: No new orders received.    Comments:

## 2023-09-26 NOTE — PROVIDER NOTIFICATION
MD Notification    Notified Person: MD    Notified Person Name: Ileana Chu,PA    Notification Date/Time: 9/26/2023 1430    Notification Interaction: Mobitto messaging    Purpose of Notification: BP 97/76. Hydralazine held. BP parameters needed. Creatinine increased to 2.65. Pt. Is due for Demadex. Please advise.    Orders Received:  Give Demadex and hold Hydralazine.  Comments:
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
## 2023-09-26 NOTE — PLAN OF CARE
A&Ox4. VSS. RA. Tele: SR. No CP/SOB reported. Indp in the room. Hep gtt @ 1500 units - PTT at 1151. NPO since 0000.   Plan: possible angio today

## 2023-09-27 ENCOUNTER — PATIENT OUTREACH (OUTPATIENT)
Dept: CARE COORDINATION | Facility: CLINIC | Age: 48
End: 2023-09-27
Payer: COMMERCIAL

## 2023-09-27 ENCOUNTER — TELEPHONE (OUTPATIENT)
Dept: CARDIOLOGY | Facility: CLINIC | Age: 48
End: 2023-09-27
Payer: COMMERCIAL

## 2023-09-27 NOTE — PROGRESS NOTES
[facility]    Background: Transitional Care Management program identified per system criteria and reviewed by [facility] team for possible outreach.    Assessment: Upon chart review, CCR Team member will not proceed with patient outreach related to this episode of Transitional Care Management program due to reason below:    Patient has active communication with a nurse, provider or care team for reason of post-hospital follow up plan.  Outreach call by CCR team not indicated to minimize duplicative efforts.     Plan: Transitional Care Management episode addressed appropriately per reason noted above.      Mahsa Aguayo  Community Health Worker  [facility]  [phone]      *Connected Care Resource Team does NOT follow patient ongoing. Referrals are identified based on internal discharge reports and the outreach is to ensure patient has an understanding of their discharge instructions.
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
## 2023-09-28 LAB
ATRIAL RATE - MUSE: 60 BPM
DIASTOLIC BLOOD PRESSURE - MUSE: NORMAL MMHG
INTERPRETATION ECG - MUSE: NORMAL
P AXIS - MUSE: 57 DEGREES
PR INTERVAL - MUSE: 150 MS
QRS DURATION - MUSE: 74 MS
QT - MUSE: 436 MS
QTC - MUSE: 436 MS
R AXIS - MUSE: 46 DEGREES
SYSTOLIC BLOOD PRESSURE - MUSE: NORMAL MMHG
T AXIS - MUSE: 65 DEGREES
VENTRICULAR RATE- MUSE: 60 BPM

## 2023-10-02 ENCOUNTER — PATIENT OUTREACH (OUTPATIENT)
Dept: CARE COORDINATION | Facility: CLINIC | Age: 48
End: 2023-10-02
Payer: COMMERCIAL

## 2023-10-05 ENCOUNTER — OFFICE VISIT (OUTPATIENT)
Dept: FAMILY MEDICINE | Facility: CLINIC | Age: 48
End: 2023-10-05
Payer: COMMERCIAL

## 2023-10-05 ENCOUNTER — LAB (OUTPATIENT)
Dept: FAMILY MEDICINE | Facility: CLINIC | Age: 48
End: 2023-10-05

## 2023-10-05 VITALS
OXYGEN SATURATION: 99 % | HEIGHT: 68 IN | TEMPERATURE: 97.9 F | DIASTOLIC BLOOD PRESSURE: 82 MMHG | BODY MASS INDEX: 44.41 KG/M2 | WEIGHT: 293 LBS | RESPIRATION RATE: 18 BRPM | HEART RATE: 75 BPM | SYSTOLIC BLOOD PRESSURE: 124 MMHG

## 2023-10-05 DIAGNOSIS — Z92.89 HISTORY OF RECENT HOSPITALIZATION: Primary | ICD-10-CM

## 2023-10-05 DIAGNOSIS — Z12.31 VISIT FOR SCREENING MAMMOGRAM: ICD-10-CM

## 2023-10-05 DIAGNOSIS — D68.52 PROTHROMBIN GENE MUTATION (H): ICD-10-CM

## 2023-10-05 DIAGNOSIS — I25.2 H/O NON-ST ELEVATION MYOCARDIAL INFARCTION (NSTEMI): ICD-10-CM

## 2023-10-05 DIAGNOSIS — Z12.11 SCREEN FOR COLON CANCER: ICD-10-CM

## 2023-10-05 DIAGNOSIS — D68.51 FACTOR V LEIDEN CARRIER (H): ICD-10-CM

## 2023-10-05 PROCEDURE — 99496 TRANSJ CARE MGMT HIGH F2F 7D: CPT | Performed by: FAMILY MEDICINE

## 2023-10-05 ASSESSMENT — PAIN SCALES - GENERAL: PAINLEVEL: NO PAIN (0)

## 2023-10-05 NOTE — PROGRESS NOTES
"  Assessment & Plan     History of recent hospitalization  H/O non-ST elevation myocardial infarction (NSTEMI)  Patient was hospitalized recently with non-STEMI.  Echocardiogram and angiogram was unremarkable.  Patient was restarted on Lipitor and metoprolol.  No recurrence of chest pain or other relevant systemic symptoms.  Recommended regular exercise, healthy diet and weight loss.  Patient has an appointment with cardiologist next week    Screen for colon cancer  - COLOGUARD([employer]); Future    Visit for screening mammogram  - MA SCREENING DIGITAL BILAT - Future  (s+30); Future    Factor V Leiden carrier (H24)  Prothrombin gene mutation (H24)  Continue Xarelto       MED REC REQUIRED  Post Medication Reconciliation Status: discharge medications reconciled, continue medications without change  BMI:   Estimated body mass index is 49.72 kg/m  as calculated from the following:    Height as of this encounter: 1.727 m (5' 8\").    Weight as of this encounter: 148.3 kg (327 lb).   Weight management plan: Discussed healthy diet and exercise guidelines      Yrn Lopez MD  [facility]    Funmi Rodríguez is a 48 year old, presenting for the following health issues:  Hospital F/U      [facility]       Hospital Follow-up Visit:    Hospital/Nursing Home/IP Rehab Facility: [facility]  Date of Admission: 9/23/23  Date of Discharge: 9/26/23  Reason(s) for Admission: NSTEMI    Was your hospitalization related to COVID-19? No   Problems taking medications regularly:  None  Medication changes since discharge: None  Problems adhering to non-medication therapy:  None    Summary of hospitalization:  [facility] discharge summary reviewed  Diagnostic Tests/Treatments reviewed.  Follow up needed: none  Other Healthcare Providers Involved in Patient s Care:         None  Update since discharge: improved.         Plan of care communicated with patient       Review of " "Systems   Constitutional, HEENT, cardiovascular, pulmonary, GI, , musculoskeletal, neuro, skin, endocrine and psych systems are negative, except as otherwise noted.      Objective    /82 (Cuff Size: Adult Large)   Pulse 75   Temp 97.9  F (36.6  C) (Tympanic)   Resp 18   Ht 1.727 m (5' 8\")   Wt 148.3 kg (327 lb)   LMP  (LMP Unknown)   SpO2 99%   BMI 49.72 kg/m    Body mass index is 49.72 kg/m .  Physical Exam   GENERAL: alert and no distress  EYES: Eyes grossly normal to inspection  HENT: normal cephalic/atraumatic, nose and mouth without ulcers or lesions, oropharynx clear, and oral mucous membranes moist  NECK: no adenopathy, no asymmetry, masses, or scars and thyroid normal to palpation  RESP: lungs clear to auscultation - no rales, rhonchi or wheezes  CV: regular rates and rhythm, normal S1 S2, no S3 or S4, and no murmur, click or rub  ABDOMEN: soft, nontender  MS: no gross musculoskeletal defects noted, no edema  SKIN: no suspicious lesions or rashes  NEURO: Normal strength and tone, mentation intact and speech normal  PSYCH: mentation appears normal, affect normal/bright                  "

## 2023-10-05 NOTE — NURSING NOTE
"Chief Complaint   Patient presents with    Hospital F/U     /82 (Cuff Size: Adult Large)   Pulse 75   Temp 97.9  F (36.6  C) (Tympanic)   Resp 18   Ht 1.727 m (5' 8\")   Wt 148.3 kg (327 lb)   LMP  (LMP Unknown)   SpO2 99%   BMI 49.72 kg/m   Estimated body mass index is 49.72 kg/m  as calculated from the following:    Height as of this encounter: 1.727 m (5' 8\").    Weight as of this encounter: 148.3 kg (327 lb).  Patient presents to the clinic using No DME      Health Maintenance that is potentially due pending provider review:    Health Maintenance Due   Topic Date Due    ADVANCE CARE PLANNING  Never done    HEPATITIS B IMMUNIZATION (1 of 3 - 3-dose series) Never done    Pneumococcal Vaccine: Pediatrics (0 to 5 Years) and At-Risk Patients (6 to 64 Years) (1 - PCV) Never done    COLORECTAL CANCER SCREENING  Never done    INFLUENZA VACCINE (1) 09/01/2023    COVID-19 Vaccine (5 - 2023-24 season) 09/01/2023    MAMMO SCREENING  10/31/2023                  "

## 2023-10-10 ENCOUNTER — HOSPITAL ENCOUNTER (OUTPATIENT)
Dept: CARDIOLOGY | Facility: CLINIC | Age: 48
Discharge: HOME | End: 2023-10-10
Attending: NURSE PRACTITIONER | Admitting: NURSE PRACTITIONER
Payer: COMMERCIAL

## 2023-10-10 DIAGNOSIS — I21.4 NSTEMI (NON-ST ELEVATED MYOCARDIAL INFARCTION) (H): ICD-10-CM

## 2023-10-10 PROCEDURE — 255N000002 HC RX 255 OP 636: Performed by: NURSE PRACTITIONER

## 2023-10-10 PROCEDURE — 75561 CARDIAC MRI FOR MORPH W/DYE: CPT | Mod: 26 | Performed by: INTERNAL MEDICINE

## 2023-10-10 PROCEDURE — 75561 CARDIAC MRI FOR MORPH W/DYE: CPT

## 2023-10-10 PROCEDURE — A9585 GADOBUTROL INJECTION: HCPCS | Performed by: NURSE PRACTITIONER

## 2023-10-10 RX ORDER — CAFFEINE CITRATE 20 MG/ML
60 SOLUTION INTRAVENOUS
Status: DISCONTINUED | OUTPATIENT
Start: 2023-10-10 | End: 2023-10-11 | Stop reason: HOSPADM

## 2023-10-10 RX ORDER — ONDANSETRON 2 MG/ML
4 INJECTION INTRAMUSCULAR; INTRAVENOUS
Status: DISCONTINUED | OUTPATIENT
Start: 2023-10-10 | End: 2023-10-11 | Stop reason: HOSPADM

## 2023-10-10 RX ORDER — METHYLPREDNISOLONE SODIUM SUCCINATE 125 MG/2ML
125 INJECTION, POWDER, LYOPHILIZED, FOR SOLUTION INTRAMUSCULAR; INTRAVENOUS
Status: DISCONTINUED | OUTPATIENT
Start: 2023-10-10 | End: 2023-10-11 | Stop reason: HOSPADM

## 2023-10-10 RX ORDER — AMINOPHYLLINE 25 MG/ML
100 INJECTION, SOLUTION INTRAVENOUS ONCE
Status: DISCONTINUED | OUTPATIENT
Start: 2023-10-10 | End: 2023-10-11 | Stop reason: HOSPADM

## 2023-10-10 RX ORDER — REGADENOSON 0.08 MG/ML
0.4 INJECTION, SOLUTION INTRAVENOUS ONCE
Status: DISCONTINUED | OUTPATIENT
Start: 2023-10-10 | End: 2023-10-11 | Stop reason: HOSPADM

## 2023-10-10 RX ORDER — DIPHENHYDRAMINE HCL 25 MG
25 CAPSULE ORAL
Status: DISCONTINUED | OUTPATIENT
Start: 2023-10-10 | End: 2023-10-11 | Stop reason: HOSPADM

## 2023-10-10 RX ORDER — DIAZEPAM 5 MG
5 TABLET ORAL EVERY 30 MIN PRN
Status: DISCONTINUED | OUTPATIENT
Start: 2023-10-10 | End: 2023-10-11 | Stop reason: HOSPADM

## 2023-10-10 RX ORDER — DIPHENHYDRAMINE HYDROCHLORIDE 50 MG/ML
25-50 INJECTION INTRAMUSCULAR; INTRAVENOUS
Status: DISCONTINUED | OUTPATIENT
Start: 2023-10-10 | End: 2023-10-11 | Stop reason: HOSPADM

## 2023-10-10 RX ORDER — GADOBUTROL 604.72 MG/ML
19 INJECTION INTRAVENOUS ONCE
Status: COMPLETED | OUTPATIENT
Start: 2023-10-10 | End: 2023-10-10

## 2023-10-10 RX ORDER — ALBUTEROL SULFATE 90 UG/1
2 AEROSOL, METERED RESPIRATORY (INHALATION) EVERY 5 MIN PRN
Status: DISCONTINUED | OUTPATIENT
Start: 2023-10-10 | End: 2023-10-11 | Stop reason: HOSPADM

## 2023-10-10 RX ORDER — ACYCLOVIR 200 MG/1
0-1 CAPSULE ORAL
Status: DISCONTINUED | OUTPATIENT
Start: 2023-10-10 | End: 2023-10-11 | Stop reason: HOSPADM

## 2023-10-10 RX ADMIN — GADOBUTROL 19 ML: 604.72 INJECTION INTRAVENOUS at 14:06

## 2023-10-12 ENCOUNTER — OFFICE VISIT (OUTPATIENT)
Dept: CARDIOLOGY | Facility: CLINIC | Age: 48
End: 2023-10-12
Payer: COMMERCIAL

## 2023-10-12 VITALS
DIASTOLIC BLOOD PRESSURE: 63 MMHG | RESPIRATION RATE: 20 BRPM | HEIGHT: 68 IN | SYSTOLIC BLOOD PRESSURE: 130 MMHG | BODY MASS INDEX: 44.41 KG/M2 | WEIGHT: 293 LBS | OXYGEN SATURATION: 100 % | HEART RATE: 74 BPM

## 2023-10-12 DIAGNOSIS — R07.9 CHEST PAIN, UNSPECIFIED TYPE: ICD-10-CM

## 2023-10-12 DIAGNOSIS — S25.01XD: Primary | ICD-10-CM

## 2023-10-12 DIAGNOSIS — E78.5 HYPERLIPIDEMIA LDL GOAL <100: ICD-10-CM

## 2023-10-12 PROCEDURE — 99214 OFFICE O/P EST MOD 30 MIN: CPT | Performed by: NURSE PRACTITIONER
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
## 2023-10-12 NOTE — PATIENT INSTRUCTIONS
Medication Changes:  None    Recommendations:  Check blood pressure at least 1 hour after medications. Call the clinic if your blood pressure is consistently greater than 130/80.     Follow-up:  Cardiology follow up at [facility]: Dr. Singh in 2-3 months.   Fasting lab in 2 months (lipid/ALT)    Cardiology Scheduling~[phone]  Cardiology Clinic RN~[phone] (Kaila RN, Lisa RN, Terri RN)
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
## 2023-10-18 ENCOUNTER — PATIENT OUTREACH (OUTPATIENT)
Dept: CARE COORDINATION | Facility: CLINIC | Age: 48
End: 2023-10-18
Payer: COMMERCIAL

## 2023-10-19 LAB — NONINV COLON CA DNA+OCC BLD SCRN STL QL: NEGATIVE

## 2023-10-23 DIAGNOSIS — I10 HYPERTENSION, UNSPECIFIED TYPE: ICD-10-CM

## 2023-10-23 DIAGNOSIS — E78.5 DYSLIPIDEMIA: Chronic | ICD-10-CM

## 2023-10-23 RX ORDER — METOPROLOL TARTRATE 25 MG/1
25 TABLET, FILM COATED ORAL 2 TIMES DAILY
Qty: 60 TABLET | Refills: 5 | Status: SHIPPED | OUTPATIENT
Start: 2023-10-23 | End: 2023-12-15

## 2023-10-23 RX ORDER — ATORVASTATIN CALCIUM 40 MG/1
40 TABLET, FILM COATED ORAL DAILY
Qty: 30 TABLET | Refills: 10 | Status: SHIPPED | OUTPATIENT
Start: 2023-10-23 | End: 2024-09-10

## 2023-10-30 ENCOUNTER — PATIENT OUTREACH (OUTPATIENT)
Dept: CARE COORDINATION | Facility: CLINIC | Age: 48
End: 2023-10-30
Payer: COMMERCIAL

## 2023-11-01 ENCOUNTER — PATIENT OUTREACH (OUTPATIENT)
Dept: CARE COORDINATION | Facility: CLINIC | Age: 48
End: 2023-11-01
Payer: COMMERCIAL

## 2023-12-05 ENCOUNTER — HOSPITAL ENCOUNTER (OUTPATIENT)
Dept: MAMMOGRAPHY | Facility: CLINIC | Age: 48
Discharge: HOME | End: 2023-12-05
Attending: FAMILY MEDICINE | Admitting: FAMILY MEDICINE
Payer: COMMERCIAL

## 2023-12-05 DIAGNOSIS — Z12.31 VISIT FOR SCREENING MAMMOGRAM: ICD-10-CM

## 2023-12-05 PROCEDURE — 77067 SCR MAMMO BI INCL CAD: CPT

## 2023-12-12 DIAGNOSIS — D68.51 FACTOR V LEIDEN CARRIER (H): ICD-10-CM

## 2023-12-12 DIAGNOSIS — D68.52 PROTHROMBIN GENE MUTATION (H): ICD-10-CM

## 2023-12-12 DIAGNOSIS — I82.5Y9 CHRONIC DEEP VEIN THROMBOSIS (DVT) OF PROXIMAL VEIN OF LOWER EXTREMITY, UNSPECIFIED LATERALITY (H): ICD-10-CM

## 2023-12-12 RX ORDER — RIVAROXABAN 20 MG/1
20 TABLET, FILM COATED ORAL
Qty: 90 TABLET | Refills: 0 | Status: SHIPPED | OUTPATIENT
Start: 2023-12-12 | End: 2024-03-18

## 2023-12-15 ENCOUNTER — OFFICE VISIT (OUTPATIENT)
Dept: CARDIOLOGY | Facility: CLINIC | Age: 48
End: 2023-12-15
Attending: NURSE PRACTITIONER
Payer: COMMERCIAL

## 2023-12-15 ENCOUNTER — LAB (OUTPATIENT)
Dept: LAB | Facility: CLINIC | Age: 48
End: 2023-12-15
Payer: COMMERCIAL

## 2023-12-15 VITALS
RESPIRATION RATE: 16 BRPM | WEIGHT: 293 LBS | SYSTOLIC BLOOD PRESSURE: 152 MMHG | BODY MASS INDEX: 51.09 KG/M2 | OXYGEN SATURATION: 99 % | HEART RATE: 80 BPM | DIASTOLIC BLOOD PRESSURE: 82 MMHG

## 2023-12-15 DIAGNOSIS — E78.5 HYPERLIPIDEMIA LDL GOAL <100: ICD-10-CM

## 2023-12-15 DIAGNOSIS — R07.9 CHEST PAIN, UNSPECIFIED TYPE: ICD-10-CM

## 2023-12-15 DIAGNOSIS — I10 HYPERTENSION, UNSPECIFIED TYPE: ICD-10-CM

## 2023-12-15 DIAGNOSIS — S25.01XD: ICD-10-CM

## 2023-12-15 LAB
ALT SERPL W P-5'-P-CCNC: 19 U/L (ref 0–50)
CHOLEST SERPL-MCNC: 131 MG/DL
FASTING STATUS PATIENT QL REPORTED: YES
HDLC SERPL-MCNC: 43 MG/DL
LDLC SERPL CALC-MCNC: 69 MG/DL
NONHDLC SERPL-MCNC: 88 MG/DL
TRIGL SERPL-MCNC: 96 MG/DL

## 2023-12-15 PROCEDURE — 80061 LIPID PANEL: CPT | Performed by: NURSE PRACTITIONER

## 2023-12-15 PROCEDURE — 84460 ALANINE AMINO (ALT) (SGPT): CPT | Performed by: NURSE PRACTITIONER

## 2023-12-15 PROCEDURE — 99204 OFFICE O/P NEW MOD 45 MIN: CPT | Performed by: INTERNAL MEDICINE

## 2023-12-15 PROCEDURE — 36415 COLL VENOUS BLD VENIPUNCTURE: CPT | Performed by: NURSE PRACTITIONER

## 2023-12-15 RX ORDER — METOPROLOL SUCCINATE 50 MG/1
50 TABLET, EXTENDED RELEASE ORAL DAILY
Qty: 90 TABLET | Refills: 3 | Status: SHIPPED | OUTPATIENT
Start: 2023-12-15
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
## 2023-12-15 NOTE — PATIENT INSTRUCTIONS
1. Toprol XL 50 mg daily   2. Call us with repeat chest pain episodes, will consider repeat CTA chest in [facility]
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
## 2023-12-21 ENCOUNTER — HOSPITAL ENCOUNTER (OUTPATIENT)
Dept: MAMMOGRAPHY | Facility: CLINIC | Age: 48
Discharge: HOME | End: 2023-12-21
Attending: FAMILY MEDICINE
Payer: COMMERCIAL

## 2023-12-21 ENCOUNTER — OFFICE VISIT (OUTPATIENT)
Dept: DERMATOLOGY | Facility: CLINIC | Age: 48
End: 2023-12-21
Payer: COMMERCIAL

## 2023-12-21 ENCOUNTER — HOSPITAL ENCOUNTER (OUTPATIENT)
Dept: ULTRASOUND IMAGING | Facility: CLINIC | Age: 48
Discharge: HOME | End: 2023-12-21
Attending: FAMILY MEDICINE
Payer: COMMERCIAL

## 2023-12-21 DIAGNOSIS — R92.8 ABNORMAL MAMMOGRAM: ICD-10-CM

## 2023-12-21 DIAGNOSIS — L73.8 SEBACEOUS HYPERPLASIA: ICD-10-CM

## 2023-12-21 DIAGNOSIS — L30.4 INTERTRIGO: Primary | ICD-10-CM

## 2023-12-21 DIAGNOSIS — D18.01 CHERRY ANGIOMA: ICD-10-CM

## 2023-12-21 DIAGNOSIS — L82.1 SEBORRHEIC KERATOSIS: ICD-10-CM

## 2023-12-21 DIAGNOSIS — Z87.898 HISTORY OF ATYPICAL NEVUS: ICD-10-CM

## 2023-12-21 DIAGNOSIS — D48.5 NEOPLASM OF UNCERTAIN BEHAVIOR OF SKIN: ICD-10-CM

## 2023-12-21 DIAGNOSIS — D23.9 DERMATOFIBROMA: ICD-10-CM

## 2023-12-21 DIAGNOSIS — L82.0 INFLAMED SEBORRHEIC KERATOSIS: ICD-10-CM

## 2023-12-21 PROCEDURE — 88305 TISSUE EXAM BY PATHOLOGIST: CPT | Performed by: DERMATOLOGY

## 2023-12-21 PROCEDURE — 76642 ULTRASOUND BREAST LIMITED: CPT | Mod: LT

## 2023-12-21 PROCEDURE — 17110 DESTRUCTION B9 LES UP TO 14: CPT | Performed by: PHYSICIAN ASSISTANT

## 2023-12-21 PROCEDURE — 11102 TANGNTL BX SKIN SINGLE LES: CPT | Mod: 59 | Performed by: PHYSICIAN ASSISTANT

## 2023-12-21 PROCEDURE — 77061 BREAST TOMOSYNTHESIS UNI: CPT | Mod: LT

## 2023-12-21 PROCEDURE — 99213 OFFICE O/P EST LOW 20 MIN: CPT | Mod: 25 | Performed by: PHYSICIAN ASSISTANT

## 2023-12-21 RX ORDER — NYSTATIN 100000 [USP'U]/G
POWDER TOPICAL
Qty: 60 G | Refills: 11 | Status: SHIPPED | OUTPATIENT
Start: 2023-12-21

## 2023-12-21 ASSESSMENT — PAIN SCALES - GENERAL: PAINLEVEL: NO PAIN (0)

## 2023-12-21 NOTE — NURSING NOTE
Chief Complaint   Patient presents with    Skin Check     Right inner elbow, left jaw, left mid back, skin tag in left axilla, chest        There were no vitals filed for this visit.  Wt Readings from Last 1 Encounters:   12/15/23 (!) 152.4 kg (336 lb)       China Valencia LPN .................12/21/2023
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
## 2023-12-21 NOTE — PATIENT INSTRUCTIONS
Wound Care Instructions     FOR SUPERFICIAL WOUNDS     [facility] [phone]  [facility] [phone]    L Jaw               AFTER 24 HOURS YOU SHOULD REMOVE THE BANDAGE AND BEGIN DAILY DRESSING CHANGES AS FOLLOWS:     1) Remove Dressing.     2) Clean and dry the area with tap water using a Q-tip or sterile gauze pad.     3) Apply Vaseline, Aquaphor, Polysporin ointment or Bacitracin ointment over entire wound.  Do NOT use Neosporin ointment.     4) Cover the wound with a band-aid, or a sterile non-stick gauze pad and micropore paper tape      REPEAT THESE INSTRUCTIONS AT LEAST ONCE A DAY UNTIL THE WOUND HAS COMPLETELY HEALED.    It is an old wives tale that a wound heals better when it is exposed to air and allowed to dry out. The wound will heal faster with a better cosmetic result if it is kept moist with ointment and covered with a bandage.    **Do not let the wound dry out.**      Supplies Needed:      *Cotton tipped applicators (Q-tips)    *Polysporin Ointment or Bacitracin Ointment (NOT NEOSPORIN)    *Band-aids or non-stick gauze pads and micropore paper tape.      PATIENT INFORMATION:    During the healing process you will notice a number of changes. All wounds develop a small halo of redness surrounding the wound.  This means healing is occurring. Severe itching with extensive redness usually indicates sensitivity to the ointment or bandage tape used to dress the wound.  You should call our office if this develops.      Swelling  and/or discoloration around your surgical site is common, particularly when performed around the eye.    All wounds normally drain.  The larger the wound the more drainage there will be.  After 7-10 days, you will notice the wound beginning to shrink and new skin will begin to grow.  The wound is healed when you can see skin has formed over the entire area.  A healed wound has a healthy, shiny look to the surface and is red to dark pink in color  to normalize.  Wounds may take approximately 4-6 weeks to heal.  Larger wounds may take 6-8 weeks.  After the wound is healed you may discontinue dressing changes.    You may experience a sensation of tightness as your wound heals. This is normal and will gradually subside.    Your healed wound may be sensitive to temperature changes. This sensitivity improves with time, but if you re having a lot of discomfort, try to avoid temperature extremes.    Patients frequently experience itching after their wound appears to have healed because of the continue healing under the skin.  Plain Vaseline will help relieve the itching.    POSSIBLE COMPLICATIONS    BLEEDING:    Leave the bandage in place.  Use tightly rolled up gauze or a cloth to apply direct pressure over the bandage for 30  minutes.  Reapply pressure for an additional 30 minutes if necessary  Use additional gauze and tape to maintain pressure once the bleeding has stopped.   WOUND CARE INSTRUCTIONS   FOR CRYOSURGERY   This area treated with liquid nitrogen should form a blister (areas treated may or may not blister-skin may just turn dark and slough off). You do not need to bandage the area unless a blister forms and breaks (which may be a few days). When the blister breaks, begin daily dressing changes as follows:  1) Clean and dry the area with tap water using clean Q-tip or sterile gauze pad.   2) Apply Polysporin ointment or Bacitracin ointment over entire wound. Do NOT use Neosporin ointment.   3) Cover the wound with a band-aid or sterile non-stick gauze pad and micropore paper tape.   REPEAT THESE INSTRUCTIONS AT LEAST ONCE A DAY UNTIL THE WOUND HAS COMPLETELY HEALED.   It is an old wives tale that a wound heals better when it is exposed to air and allowed to dry out. The wound will heal faster with a better cosmetic result if it is kept moist with ointment and covered with a bandage.   Do not let the wound dry out.   IMPORTANT INFORMATION ON REVERSE SIDE    Supplies Needed:   *Cotton tipped applicators (Q-tips)   *Polysporin ointment or Bacitracin ointment (NOT NEOSPORIN)   *Band-aids, or non stick gauze pads and micropore paper tape   PATIENT INFORMATION   During the healing process you will notice a number of changes. All wounds develop a small halo of redness surrounding the wound. This means healing is occurring. Severe itching with extensive redness usually indicates sensitivity to the ointment or bandage tape used to dress the wound. You should call our office if this develops.   Swelling and/or discoloration around your surgical site is common, particularly when performed around the eye.   All wounds normally drain. The larger the wound the more drainage there will be. After 7-10 days, you will notice the wound beginning to shrink and new skin will begin to grow. The wound is healed when you can see skin has formed over the entire area. A healed wound has a healthy, shiny look to the surface and is red to dark pink in color to normalize. Wounds may take approximately 4-6 weeks to heal. Larger wounds may take 6-8 weeks. After the wound is healed you may discontinue dressing changes.   You may experience a sensation of tightness as your wound heals. This is normal and will gradually subside.   Your healed wound may be sensitive to temperature changes. This sensitivity improves with time, but if you re having a lot of discomfort, try to avoid temperature extremes.   Patients frequently experience itching after their wound appears to have healed because of the continue healing under the skin. Plain Vaseline will help relieve the itching.
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
## 2023-12-25 LAB
PATH REPORT.COMMENTS IMP SPEC: NORMAL
PATH REPORT.COMMENTS IMP SPEC: NORMAL
PATH REPORT.FINAL DX SPEC: NORMAL
PATH REPORT.GROSS SPEC: NORMAL
PATH REPORT.MICROSCOPIC SPEC OTHER STN: NORMAL
PATH REPORT.RELEVANT HX SPEC: NORMAL

## 2023-12-28 ENCOUNTER — HOSPITAL ENCOUNTER (OUTPATIENT)
Dept: MAMMOGRAPHY | Facility: CLINIC | Age: 48
Discharge: HOME | End: 2023-12-28
Attending: FAMILY MEDICINE
Payer: COMMERCIAL

## 2023-12-28 DIAGNOSIS — N63.20 LEFT BREAST MASS: ICD-10-CM

## 2023-12-28 DIAGNOSIS — N63.21 MASS OF UPPER OUTER QUADRANT OF LEFT BREAST: ICD-10-CM

## 2023-12-28 PROCEDURE — 88305 TISSUE EXAM BY PATHOLOGIST: CPT | Mod: TC | Performed by: FAMILY MEDICINE

## 2023-12-28 PROCEDURE — 19083 BX BREAST 1ST LESION US IMAG: CPT | Mod: LT

## 2023-12-28 PROCEDURE — 250N000009 HC RX 250: Performed by: FAMILY MEDICINE

## 2023-12-28 PROCEDURE — 999N000065 MA POST PROCEDURE LEFT

## 2023-12-28 RX ADMIN — LIDOCAINE HYDROCHLORIDE 5 ML: 10 INJECTION, SOLUTION INFILTRATION; PERINEURAL at 10:29

## 2023-12-28 NOTE — DISCHARGE INSTRUCTIONS
After Your Breast Biopsy  Bleeding, bruising, and pain  Breast tenderness and some bruising is normal and may last several days. You may wear your bra overnight to support the breast.  You may use an ice pack for pain. Place it over the area for 15 to 20 minutes, several times a day.  You may take over-the-counter pain medicine:  On the day of the biopsy, we recommend Tylenol (acetaminophen) because it does not raise your risk of bleeding.  The next day, you may take an anti-inflammatory medicine (aspirin, ibuprofen, Motrin, Aleve, Advil), unless your doctor tells you not to.  Bandages and showering  Keep your bandage in place until tomorrow morning. Don't get it wet.  If you have small pieces of tape on the skin, leave them in place. They will fall off on their own, or you can remove them after 5 days.  You may shower the next morning after your biopsy.  Activity  No heavy activity (no running, no gym workouts, no lifting, no vacuuming, etc.) on the day of your biopsy.  You may go back to normal activity the next day. But limit what you do if you still have pain or discomfort.  Infection  Infection is rare. Signs of infection include:  Fever (including sweats and chills)  Redness  Pain that gets worse  Fluid draining from the biopsy site  Biopsy results  Results may take up to 5 business days.  A nurse or doctor from the [facility] Center will call with your results. We will also send the results to the doctor that ordered your biopsy.  If you have not gotten your results in 5 days, please call the Breast Center.  Call the Breast Center with questions or if:   You have bleeding that lasts more than 20 minutes.  You have pain that you can't control.  You have signs of infection (fever, sweats, chills, redness, increasing pain, or drainage).  After hours, please call the doctor who ordered your biopsy.  For informational purposes only. Not to replace the advice of your health care provider. Copyright   2010 [facility]. All rights reserved. Clinically reviewed by [facility], Director, [facility]. [id number] - REV 08/23.

## 2023-12-29 ENCOUNTER — TELEPHONE (OUTPATIENT)
Dept: MAMMOGRAPHY | Facility: CLINIC | Age: 48
End: 2023-12-29
Payer: COMMERCIAL

## 2023-12-29 LAB
PATH REPORT.COMMENTS IMP SPEC: NORMAL
PATH REPORT.FINAL DX SPEC: NORMAL
PATH REPORT.GROSS SPEC: NORMAL
PATH REPORT.MICROSCOPIC SPEC OTHER STN: NORMAL
PATH REPORT.RELEVANT HX SPEC: NORMAL
PHOTO IMAGE: NORMAL

## 2023-12-29 PROCEDURE — 88305 TISSUE EXAM BY PATHOLOGIST: CPT | Mod: 26 | Performed by: PATHOLOGY

## 2023-12-29 NOTE — TELEPHONE ENCOUNTER
After review by Breast Center Radiologist, Dr. Jonathan Martinez, Anne was called,  verified, and given her 2023 LEFT Breast Biopsy results (Stromal Fibrosis) and recommended Follow up (6 month follow up left breast diagnostic mammogram).   Patient denies any concerns with the biopsy site. Ordering provider was informed of the results and follow up plan.  I encouraged her to contact her doctor with any further breast concerns.  Patient verbalized understanding and agrees with the plan of care.        Roseline Tate RN BSN  Procedure Nurse  [facility]  [phone]    -----------------------------------------------------------------------------------------------------------------------------------------    [facility]  Anne Dai J [id number]  F, 1975  Surgical Pathology Report (Final result) [id number]  Authorizing Provider: Yrn Lopez MD Ordering Provider: Yrn Lopez MD  Ordering Location: [facility]  Collected: 2023 10:33 AM  Pathologist: Sheri Tariq MD Received: 2023 11:42 AM  .  Specimens  A Breast, Left  .  .  Final Diagnosis  A. Breast, left, 3:00, 5 cm from nipple (0.6 cm mass), ultrasound-guided needle core biopsy:  -Benign breast tissue with dense stromal fibrosis.  -See comment.  Electronically signed by Sheri Tariq MD on 2023 at 9:39 AM

## 2024-01-02 ENCOUNTER — VIRTUAL VISIT (OUTPATIENT)
Dept: FAMILY MEDICINE | Facility: CLINIC | Age: 49
End: 2024-01-02
Payer: COMMERCIAL

## 2024-01-02 DIAGNOSIS — N63.20 MASS OF LEFT BREAST, UNSPECIFIED QUADRANT: Primary | ICD-10-CM

## 2024-01-02 DIAGNOSIS — E66.01 MORBID OBESITY (H): Primary | ICD-10-CM

## 2024-01-02 PROCEDURE — 99213 OFFICE O/P EST LOW 20 MIN: CPT | Mod: VID | Performed by: FAMILY MEDICINE

## 2024-01-02 RX ORDER — SEMAGLUTIDE 0.5 MG/.5ML
0.5 INJECTION, SOLUTION SUBCUTANEOUS WEEKLY
Qty: 2 ML | Refills: 0 | Status: SHIPPED | OUTPATIENT
Start: 2024-01-30 | End: 2024-01-29

## 2024-01-02 RX ORDER — SEMAGLUTIDE 0.25 MG/.5ML
0.25 INJECTION, SOLUTION SUBCUTANEOUS WEEKLY
Qty: 2 ML | Refills: 0 | Status: SHIPPED | OUTPATIENT
Start: 2024-01-02 | End: 2024-01-29

## 2024-01-02 RX ORDER — SEMAGLUTIDE 1 MG/.5ML
1 INJECTION, SOLUTION SUBCUTANEOUS WEEKLY
Qty: 6 ML | Refills: 3 | Status: SHIPPED | OUTPATIENT
Start: 2024-03-02 | End: 2024-01-29

## 2024-01-02 NOTE — PROGRESS NOTES
Anne is a 48 year old who is being evaluated via a billable video visit.      How would you like to obtain your AVS? MyChart  If the video visit is dropped, the invitation should be resent by: Text to cell phone: [phone]  Will anyone else be joining your video visit? No          Assessment & Plan     Morbid obesity (H)  Management options discussed.  Shared decision made to start Wegovy for weight loss, sister to continue activity as tolerated, healthy diet.  MTM referral placed and TSH ordered.  All questions answered.  - Semaglutide-Weight Management (WEGOVY) 0.25 MG/0.5ML pen; Inject 0.25 mg Subcutaneous once a week for 28 days  - Semaglutide-Weight Management (WEGOVY) 0.5 MG/0.5ML pen; Inject 0.5 mg Subcutaneous once a week for 28 days  - Semaglutide-Weight Management (WEGOVY) 1 MG/0.5ML pen; Inject 1 mg Subcutaneous once a week  - insulin pen needle (32G X 4 MM) 32G X 4 MM miscellaneous; Use 1 pen needles daily or as directed.  - Med Therapy Management Referral      Yrn Lopez MD  [facility]    Subjective   Anne is a 48 year old, presenting for the following health issues:  Weight Problem      History of Present Illness       Reason for visit:  Weight Management    She eats 2-3 servings of fruits and vegetables daily.She consumes 0 sweetened beverage(s) daily.She exercises with enough effort to increase her heart rate 10 to 19 minutes per day.  She exercises with enough effort to increase her heart rate 4 days per week.   She is taking medications regularly.  332.5 # per patient  Wt Readings from Last 4 Encounters:   12/15/23 (!) 152.4 kg (336 lb)   10/12/23 149 kg (328 lb 6.4 oz)   10/05/23 148.3 kg (327 lb)   09/26/23 149.4 kg (329 lb 4.8 oz)    Has tried weight watchers and has worked with nutritionist and has not been successful at losing weight        Review of Systems   Constitutional, HEENT, cardiovascular, pulmonary, gi and gu systems are negative, except as  otherwise noted.      Objective    Vitals - Patient Reported  Systolic (Patient Reported): 117  Diastolic (Patient Reported): 69  Weight (Patient Reported): 150.8 kg (332 lb 8 oz)      Vitals:  No vitals were obtained today due to virtual visit.    Physical Exam   GENERAL: Healthy, alert and no distress  EYES: Eyes grossly normal to inspection.  No discharge or erythema, or obvious scleral/conjunctival abnormalities.  RESP: No audible wheeze, cough, or visible cyanosis.  No visible retractions or increased work of breathing.    SKIN: Visible skin clear. No significant rash, abnormal pigmentation or lesions.  NEURO: Cranial nerves grossly intact.  Mentation and speech appropriate for age.  PSYCH: Mentation appears normal, affect normal/bright, judgement and insight intact, normal speech and appearance well-groomed.        Video-Visit Details    Type of service:  Video Visit     Originating Location (pt. Location): Home    Distant Location (provider location):  On-site  Platform used for Video Visit: [employer]

## 2024-01-13 DIAGNOSIS — N63.20 MASS OF LEFT BREAST, UNSPECIFIED QUADRANT: ICD-10-CM

## 2024-01-17 ENCOUNTER — TELEPHONE (OUTPATIENT)
Dept: PHARMACY | Facility: CLINIC | Age: 49
End: 2024-01-17
Payer: COMMERCIAL

## 2024-01-17 NOTE — TELEPHONE ENCOUNTER
I called patient for scheduled MTM visit as referred by PCP for weight management. This is a new Wegovy start under [employer] employee insurance and meets BMI requirements. Patient needs to see one of our hospital-based MTM pharmacists who specialize in weight management. Visit rescheduled with Niki WeinbergD on 1/29/24.    Nellie Velasquez, PharmD, [facility]  Medication Therapy Management Pharmacist

## 2024-01-29 ENCOUNTER — VIRTUAL VISIT (OUTPATIENT)
Dept: CARDIOLOGY | Facility: CLINIC | Age: 49
End: 2024-01-29
Attending: PHYSICIAN ASSISTANT
Payer: COMMERCIAL

## 2024-01-29 ENCOUNTER — TELEPHONE (OUTPATIENT)
Dept: SURGERY | Facility: CLINIC | Age: 49
End: 2024-01-29
Payer: COMMERCIAL

## 2024-01-29 VITALS — BODY MASS INDEX: 51.32 KG/M2 | WEIGHT: 293 LBS

## 2024-01-29 DIAGNOSIS — E66.9 OBESITY: Primary | ICD-10-CM

## 2024-01-29 DIAGNOSIS — E66.01 MORBID OBESITY (H): Primary | ICD-10-CM

## 2024-01-29 ASSESSMENT — PAIN SCALES - GENERAL: PAINLEVEL: MILD PAIN (2)

## 2024-01-29 NOTE — Clinical Note
Darius Lopez,  I am one of the clinic pharmacists with the comprehensive weight management team.  I saw Anne as a part of the [facility] insurance requirements and coverage of injectable weight loss therapies.  I reviewed with her the use of Zepbound and recommended this in replacement of Wegovy due to shortages of Wegovy currently.  I have planned follow-up with her in March.  Please let me know if you have any questions or concerns.  Thank you, Dusty

## 2024-01-29 NOTE — Clinical Note
1/29/2024      RE: Anne Dai  [address]  [place] [address]       Dear Colleague,    Thank you for the opportunity to participate in the care of your patient, Anne Dai, at the [facility] HEART [facility]. Please see a copy of my visit note below.    Medication Therapy Management (MTM) Encounter    ASSESSMENT:                            Medication Adherence/Access: No issues identified    Weight management: Weight unchanged, appropriate candidate for initiation of GLP-1/GIP agonist therapy.  Negative baseline GI symptomatology.  Pretreatment BMI greater than 40 kg/m .  She does have a background of cardiovascular disease and currently follows with cardiology.  There are no overt concerns with use of GLP-1/GIP agonist therapy in the setting of beta-blocker, statin or anticoagulant therapies.  Did briefly discuss potential for modified absorption of Xarelto, though there is no overt clinical implications of this in the setting of GLP-1/GIP agonist therapy. Negative history of pancreatitis, medullary thyroid cancer and multiple endocrine neoplasia type 2.      For patients that are under [email or web address] insurance coverage, it is mandated by insurance that each qualifying patient meet with hospital based Weight Management Medication Therapy Management pharmacist to continue therapy coverage. The following patient meets the below coverage criteria and can therefore continue GLP-1/GIP agonist therapy for Weight Management:    Adult  BMI >40 with or without comorbidities   OR   BMI >30 + NAFLD*   at time of initiating GLP-1/GIP agonist therapy Approved for 29 weeks  Met Updated Initial Criteria   At least 5% weight loss of baseline body weight  Approved for 12 months        PLAN:                            Start Zepbound 2.5 mg once weekly for 4 weeks then increase to 5 mg once weekly thereafter.    Follow-up with me in  "March as scheduled.    SUBJECTIVE/OBJECTIVE:                          Anne Dai is a 48 year old female called for an initial visit. She was referred to me from [facility] insurance requirements .      Reason for visit: GLP-1/GIP agonist therapy.    Allergies/ADRs: Reviewed in chart  Past Medical History: Reviewed in chart  Tobacco: She reports that she has never smoked. She has never been exposed to tobacco smoke. She has never used smokeless tobacco.      Medication Adherence/Access: no issues reported    Weight management:  Phone consult to discuss initiation of injectable weight management therapy.  Patient follows with Dr. Lopez, and was recently advised to initiate Wegovy, had been unable to as result of shortages and insurance changes.  As a result, she is not currently on any therapies. Has a history genetic clotting factors, elevated tropinin, aortic tear. Remains on metoprolol, atorvastatin and Xarelto as a result.  Continues to follow with cardiology.    Fluid/Water intake: 64 oz water or greater daily   Diet: 3 meals/day with a snack or so, wide range of things, has tracked eating for years, does not have a calorie deficit. Eats mostly a plant based diet. Does struggle with protein intake as a result. Tries cottage cheese or greek yogurt, has never really liked meat.   Physical activity: Has an arthritic hip, needs a total hip replacement, swims and rides a \"New Step\" 3-4 times per week.     Medical History:  MEN2/Medullary Thyroid Cancer: Negative   Pancreatitis: Negative   Baseline GI symptomatology: Occasional acid reflux that has improved with use of probiotic.        Wt Readings from Last 4 Encounters:   01/29/24 (!) 153.1 kg (337 lb 8 oz)   12/15/23 (!) 152.4 kg (336 lb)   10/12/23 149 kg (328 lb 6.4 oz)   10/05/23 148.3 kg (327 lb)     Body Mass Index (BMI) Body mass index is 51.32 kg/m .    Today's Vitals: Wt (!) 153.1 kg (337 lb 8 oz)   BMI 51.32 kg/m      Lab Results   Component Value Date    A1C " 5.3 09/24/2023     Lab Results   Component Value Date    CHOL 131 12/15/2023    CHOL 248 01/03/2019     Lab Results   Component Value Date    HDL 43 12/15/2023    HDL 39 01/03/2019     Lab Results   Component Value Date    LDL 69 12/15/2023     01/03/2019     Lab Results   Component Value Date    TRIG 96 12/15/2023    TRIG 199 01/03/2019     Lab Results   Component Value Date    CHOLHDLRATIO 6.0 05/21/2012     ----------------      I spent 20 minutes with this patient today. All changes were made via collaborative practice agreement with Janice Crawley PA-C. A copy of the visit note was provided to the patient's provider(s).    A summary of these recommendations was sent via [employer].    Pro Velasquez, PharmD, BCACP  Medication Therapy Management Pharmacist  [facility]     Telemedicine Visit Details  Type of service:  Telephone visit  Start Time:  1105am  End Time:  1125am     Medication Therapy Recommendations  No medication therapy recommendations to display           Please do not hesitate to contact me if you have any questions/concerns.     Sincerely,     PRO VELASQUEZ [facility]

## 2024-01-29 NOTE — NURSING NOTE
Is the patient currently in the state of [place]? YES    Visit mode:TELEPHONE    If the visit is dropped, the patient can be reconnected by: TELEPHONE VISIT: Phone number: [phone]    Will anyone else be joining the visit? NO  (If patient encounters technical issues they should call [phone]    How would you like to obtain your AVS? MyChart    Are changes needed to the allergy or medication list? Pt stated no changes to allergies and Pt stated no med changes    Reason for visit: Medication Therapy Management    Kimberly Romeo VVF

## 2024-01-29 NOTE — PATIENT INSTRUCTIONS
"Recommendations from today's MTM visit:                                                    MTM (medication therapy management) is a service provided by a clinical pharmacist designed to help you get the most of out of your medicines.      Start Zepbound 2.5 mg once weekly for 4 weeks then increase to 5 mg once weekly thereafter.     Follow-up with me in March as scheduled.    It was great speaking with you today.  I value your experience and would be very thankful for your time in providing feedback in our clinic survey. In the next few days, you may receive an email or text message from [employer] with a link to a survey related to your  clinical pharmacist.\"     To schedule another MTM appointment, please call the clinic directly or you may call the MTM scheduling line at [phone].    My Clinical Pharmacist's contact information:                                                      Please feel free to contact me with any questions or concerns you have.      Pro Velasquez, PharmD, BCACP  Medication Therapy Management Pharmacist  [facility]    "

## 2024-01-29 NOTE — PROGRESS NOTES
Medication Therapy Management (MTM) Encounter    ASSESSMENT:                            Medication Adherence/Access: No issues identified    Weight management: Weight unchanged, appropriate candidate for initiation of GLP-1/GIP agonist therapy.  Negative baseline GI symptomatology.  Pretreatment BMI greater than 40 kg/m .  She does have a background of cardiovascular disease and currently follows with cardiology.  There are no overt concerns with use of GLP-1/GIP agonist therapy in the setting of beta-blocker, statin or anticoagulant therapies.  Did briefly discuss potential for modified absorption of Xarelto, though there is no overt clinical implications of this in the setting of GLP-1/GIP agonist therapy. Negative history of pancreatitis, medullary thyroid cancer and multiple endocrine neoplasia type 2.      For patients that are under [employer] insurance coverage, it is mandated by insurance that each qualifying patient meet with hospital based Weight Management Medication Therapy Management pharmacist to continue therapy coverage. The following patient meets the below coverage criteria and can therefore continue GLP-1/GIP agonist therapy for Weight Management:    Adult  BMI >40 with or without comorbidities   OR   BMI >30 + NAFLD*   at time of initiating GLP-1/GIP agonist therapy Approved for 29 weeks  Met Updated Initial Criteria   At least 5% weight loss of baseline body weight  Approved for 12 months        PLAN:                            Start Zepbound 2.5 mg once weekly for 4 weeks then increase to 5 mg once weekly thereafter.    Follow-up with me in March as scheduled.    SUBJECTIVE/OBJECTIVE:                          Anne Dai is a 48 year old female called for an initial visit. She was referred to me from [facility] insurance requirements .      Reason for visit: GLP-1/GIP agonist therapy.    Allergies/ADRs: Reviewed in chart  Past Medical History: Reviewed in chart  Tobacco: She reports  "that she has never smoked. She has never been exposed to tobacco smoke. She has never used smokeless tobacco.      Medication Adherence/Access: no issues reported    Weight management:  Phone consult to discuss initiation of injectable weight management therapy.  Patient follows with Dr. Lopez, and was recently advised to initiate Wegovy, had been unable to as result of shortages and insurance changes.  As a result, she is not currently on any therapies. Has a history genetic clotting factors, elevated tropinin, aortic tear. Remains on metoprolol, atorvastatin and Xarelto as a result.  Continues to follow with cardiology.    Fluid/Water intake: 64 oz water or greater daily   Diet: 3 meals/day with a snack or so, wide range of things, has tracked eating for years, does not have a calorie deficit. Eats mostly a plant based diet. Does struggle with protein intake as a result. Tries cottage cheese or greek yogurt, has never really liked meat.   Physical activity: Has an arthritic hip, needs a total hip replacement, swims and rides a \"New Step\" 3-4 times per week.     Medical History:  MEN2/Medullary Thyroid Cancer: Negative   Pancreatitis: Negative   Baseline GI symptomatology: Occasional acid reflux that has improved with use of probiotic.        Wt Readings from Last 4 Encounters:   01/29/24 (!) 153.1 kg (337 lb 8 oz)   12/15/23 (!) 152.4 kg (336 lb)   10/12/23 149 kg (328 lb 6.4 oz)   10/05/23 148.3 kg (327 lb)     Body Mass Index (BMI) Body mass index is 51.32 kg/m .    Today's Vitals: Wt (!) 153.1 kg (337 lb 8 oz)   BMI 51.32 kg/m      Lab Results   Component Value Date    A1C 5.3 09/24/2023     Lab Results   Component Value Date    CHOL 131 12/15/2023    CHOL 248 01/03/2019     Lab Results   Component Value Date    HDL 43 12/15/2023    HDL 39 01/03/2019     Lab Results   Component Value Date    LDL 69 12/15/2023     01/03/2019     Lab Results   Component Value Date    TRIG 96 12/15/2023    TRIG 199 " 01/03/2019     Lab Results   Component Value Date    SHASHANKHDLRPRIYANKA 6.0 05/21/2012     ----------------      I spent 20 minutes with this patient today. All changes were made via collaborative practice agreement with Janice Crawley PA-C. A copy of the visit note was provided to the patient's provider(s).    A summary of these recommendations was sent via [employer].    Pro Velasquez, PharmD, BCACP  Medication Therapy Management Pharmacist  [facility]     Telemedicine Visit Details  Type of service:  Telephone visit  Start Time:  1105am  End Time:  1125am     Medication Therapy Recommendations  No medication therapy recommendations to display

## 2024-02-04 ENCOUNTER — HEALTH MAINTENANCE LETTER (OUTPATIENT)
Age: 49
End: 2024-02-04

## 2024-02-29 ENCOUNTER — TELEPHONE (OUTPATIENT)
Dept: RHEUMATOLOGY | Facility: CLINIC | Age: 49
End: 2024-02-29
Payer: COMMERCIAL

## 2024-02-29 NOTE — TELEPHONE ENCOUNTER
Good morning,    We received a phone call this morning from this patient. She is looking for a refill on her Zepbound as she is going to be out. I looked at her chart and it looks like the prescription you sent back in January did not have any refills. Can you send a prescription to the [facility] for her? She said she has a return visit with you coming up in March but she will run out of the medication before then.     Thank you,  Majo Iqbal, [facility]

## 2024-03-16 DIAGNOSIS — D68.52 PROTHROMBIN GENE MUTATION (H): ICD-10-CM

## 2024-03-16 DIAGNOSIS — D68.51 FACTOR V LEIDEN CARRIER (H): ICD-10-CM

## 2024-03-16 DIAGNOSIS — I82.5Y9 CHRONIC DEEP VEIN THROMBOSIS (DVT) OF PROXIMAL VEIN OF LOWER EXTREMITY, UNSPECIFIED LATERALITY (H): ICD-10-CM

## 2024-03-18 ENCOUNTER — VIRTUAL VISIT (OUTPATIENT)
Dept: CARDIOLOGY | Facility: CLINIC | Age: 49
End: 2024-03-18
Attending: PHYSICIAN ASSISTANT
Payer: COMMERCIAL

## 2024-03-18 VITALS — BODY MASS INDEX: 44.41 KG/M2 | HEIGHT: 68 IN | WEIGHT: 293 LBS

## 2024-03-18 DIAGNOSIS — E66.01 MORBID OBESITY (H): Primary | ICD-10-CM

## 2024-03-18 RX ORDER — RIVAROXABAN 20 MG/1
20 TABLET, FILM COATED ORAL
Qty: 90 TABLET | Refills: 0 | Status: SHIPPED | OUTPATIENT
Start: 2024-03-18 | End: 2024-07-15

## 2024-03-18 ASSESSMENT — PAIN SCALES - GENERAL: PAINLEVEL: MILD PAIN (2)

## 2024-03-18 NOTE — Clinical Note
3/18/2024      RE: Anne Dai  [address]  [place] [address]       Dear Colleague,    Thank you for the opportunity to participate in the care of your patient, Anne Dai, at the [facility] HEART [facility]. Please see a copy of my visit note below.    Medication Therapy Management (MTM) Encounter    ASSESSMENT:                            Medication Adherence/Access: No issues identified    Weight management: Steady, positive, well-tolerated response to initiation of Zepbound.  Post administration nausea is mild and well-tolerated.  Some changed joint pain from baseline, unlikely to be related to Zepbound.  Given her positive response to this point, advise she remain on 5 mg until she reach plateau, then consider dose increase based on continued tolerability.  Did discuss newly reported charges of Zepbound, patient does prefer to take conservative approach with regard to remaining on 5 mg dosing.    PLAN:                            Remain on Zepbound 5 mg once weekly as discussed.  If you encounter a weight plateau, increase to 7.5 mg once weekly.  Remain on Zepbound 7.5 mg once weekly for at least 4 weeks.  You may increase to 10 mg once weekly at that time if the 7.5 mg strength does not yield weight change.    Follow-up with me in July as scheduled.    SUBJECTIVE/OBJECTIVE:                          Anne Dai is a 48 year old female called for a follow-up visit.       Reason for visit: Zepbound follow up .    Allergies/ADRs: Reviewed in chart  Past Medical History: Reviewed in chart  Tobacco: She reports that she has never smoked. She has never been exposed to tobacco smoke. She has never used smokeless tobacco.      Medication Adherence/Access: no issues reported    Weight management:  Zepbound 5 mg weekly     Phone consult to follow-up initiation of Zepbound.  Patient states she administered second injection of 5 mg weekly on  "Friday. Notes some nausea occasionally, also some joint aches. Unsure of effect of weather or activity levels. Nausea seems to be associated with 2 days post administration, as well as if she notices she hasn't eaten. But otherwise very manageable. No constipation, diarrhea, acid reflux. Does note early satiety and also comments on a reduction in food noise. Weighs every Friday, has consistently lost weight, 12 lbs since first injection, 1-2 lbs/week.     Wt Readings from Last 4 Encounters:   03/18/24 146.7 kg (323 lb 8 oz)   01/29/24 (!) 153.1 kg (337 lb 8 oz)   12/15/23 (!) 152.4 kg (336 lb)   10/12/23 149 kg (328 lb 6.4 oz)     Body Mass Index (BMI) Body mass index is 49.19 kg/m .    Today's Vitals: Ht 1.727 m (5' 8\")   Wt 146.7 kg (323 lb 8 oz)   BMI 49.19 kg/m      Lab Results   Component Value Date    A1C 5.3 09/24/2023     ----------------      I spent 15 minutes with this patient today. All changes were made via collaborative practice agreement with Janice Crawley PA-C . A copy of the visit note was provided to the patient's provider(s).    A summary of these recommendations was sent via [employer].    Pro Velasquez, PharmD, BCACP  Medication Therapy Management Pharmacist  [facility]     Telemedicine Visit Details  Type of service:  Telephone visit  Start Time:  930am  End Time:  945am     Medication Therapy Recommendations  No medication therapy recommendations to display           Please do not hesitate to contact me if you have any questions/concerns.     Sincerely,     PRO VELASQUEZ CARLOS  "

## 2024-03-18 NOTE — NURSING NOTE
Is the patient currently in the state of [place]? YES    Visit mode:TELEPHONE    If the visit is dropped, the patient can be reconnected by: TELEPHONE VISIT: Phone number:   Telephone Information:   Mobile [phone]       Will anyone else be joining the visit? NO  (If patient encounters technical issues they should call [phone]    How would you like to obtain your AVS? MyChart    Are changes needed to the allergy or medication list? Yes Pt states also taking daily probiotic in morning and multivitamin.    Reason for visit: RECHECK    Pt states 2/10 right hip arthritis chronic pain today.    Jose ELLIOTT

## 2024-03-18 NOTE — PROGRESS NOTES
Medication Therapy Management (MTM) Encounter    ASSESSMENT:                            Medication Adherence/Access: No issues identified    Weight management: Steady, positive, well-tolerated response to initiation of Zepbound.  Post administration nausea is mild and well-tolerated.  Some changed joint pain from baseline, unlikely to be related to Zepbound.  Given her positive response to this point, advise she remain on 5 mg until she reach plateau, then consider dose increase based on continued tolerability.  Did discuss newly reported charges of Zepbound, patient does prefer to take conservative approach with regard to remaining on 5 mg dosing.    PLAN:                            Remain on Zepbound 5 mg once weekly as discussed.  If you encounter a weight plateau, increase to 7.5 mg once weekly.  Remain on Zepbound 7.5 mg once weekly for at least 4 weeks.  You may increase to 10 mg once weekly at that time if the 7.5 mg strength does not yield weight change.    Follow-up with me in July as scheduled.    SUBJECTIVE/OBJECTIVE:                          Anne Dai is a 48 year old female called for a follow-up visit.       Reason for visit: Zepbound follow up .    Allergies/ADRs: Reviewed in chart  Past Medical History: Reviewed in chart  Tobacco: She reports that she has never smoked. She has never been exposed to tobacco smoke. She has never used smokeless tobacco.      Medication Adherence/Access: no issues reported    Weight management:  Zepbound 5 mg weekly     Phone consult to follow-up initiation of Zepbound.  Patient states she administered second injection of 5 mg weekly on Friday. Notes some nausea occasionally, also some joint aches. Unsure of effect of weather or activity levels. Nausea seems to be associated with 2 days post administration, as well as if she notices she hasn't eaten. But otherwise very manageable. No constipation, diarrhea, acid reflux. Does note early satiety and also comments on a  "reduction in food noise. Weighs every Friday, has consistently lost weight, 12 lbs since first injection, 1-2 lbs/week.     Wt Readings from Last 4 Encounters:   03/18/24 146.7 kg (323 lb 8 oz)   01/29/24 (!) 153.1 kg (337 lb 8 oz)   12/15/23 (!) 152.4 kg (336 lb)   10/12/23 149 kg (328 lb 6.4 oz)     Body Mass Index (BMI) Body mass index is 49.19 kg/m .    Today's Vitals: Ht 1.727 m (5' 8\")   Wt 146.7 kg (323 lb 8 oz)   BMI 49.19 kg/m      Lab Results   Component Value Date    A1C 5.3 09/24/2023     ----------------      I spent 15 minutes with this patient today. All changes were made via collaborative practice agreement with Janice Crawley PA-C . A copy of the visit note was provided to the patient's provider(s).    A summary of these recommendations was sent via [employer].    Pro Velasquez, PharmD, BCACP  Medication Therapy Management Pharmacist  [facility]     Telemedicine Visit Details  Type of service:  Telephone visit  Start Time:  930am  End Time:  945am     Medication Therapy Recommendations  No medication therapy recommendations to display       "

## 2024-03-18 NOTE — PATIENT INSTRUCTIONS
"Recommendations from today's MTM visit:                                                    MTM (medication therapy management) is a service provided by a clinical pharmacist designed to help you get the most of out of your medicines.      Remain on Zepbound 5 mg once weekly as discussed.  If you encounter a weight plateau, increase to 7.5 mg once weekly.  Remain on Zepbound 7.5 mg once weekly for at least 4 weeks.  You may increase to 10 mg once weekly at that time if the 7.5 mg strength does not yield weight change.     Follow-up with me in July as scheduled.    It was great speaking with you today.  I value your experience and would be very thankful for your time in providing feedback in our clinic survey. In the next few days, you may receive an email or text message from [employer] with a link to a survey related to your  clinical pharmacist.\"     To schedule another MTM appointment, please call the clinic directly or you may call the MTM scheduling line at [phone].    My Clinical Pharmacist's contact information:                                                      Please feel free to contact me with any questions or concerns you have.      Pro Velasquez, PharmD, BCACP  Medication Therapy Management Pharmacist  [facility]    "

## 2024-03-18 NOTE — PROGRESS NOTES
"Virtual Visit Details    Type of service:  Telephone Visit   Phone call duration: *** minutes   Originating Location (pt. Location): {patient location:679653::\"Home\"}  {PROVIDER LOCATION On-site should be selected for visits conducted from your clinic location or adjoining [facility] hospital, academic office, or other nearby [facility] building. Off-site should be selected for all other provider locations, including home:740121}  Distant Location (provider location):  {virtual location provider:962977}  "

## 2024-03-18 NOTE — TELEPHONE ENCOUNTER
Pending Prescriptions:                       Disp   Refills    XARELTO ANTICOAGULANT 20 MG TABS tablet [P*90 tab*0        Sig: TAKE ONE TABLET BY MOUTH ONCE DAILY WITH DINNER    Routing refill request to provider for review/approval because:    Anticoagulant Agents [id number]/16/2024 09:03 AM   Protocol Details   Normal Platelets on file in past 12 months    Creatinine Clearance greater than 50 ml/min on file in past 3 mos    Serum creatinine less than or equal to 1.4 on file in past 3 mos    Medication is active on med list    GFR on file in the past 12 months and most recent GFR is normal    Recent (6 mo) or future (90 days) visit within the authorizing provider's specialty    Medication indicated for associated diagnosis    Patient is 18 years of age or older    No active pregnancy on record    No positive pregnancy test within past 12 months        Mary Kate RN  [facility]

## 2024-04-10 ENCOUNTER — MYC REFILL (OUTPATIENT)
Dept: FAMILY MEDICINE | Facility: CLINIC | Age: 49
End: 2024-04-10
Payer: COMMERCIAL

## 2024-04-10 DIAGNOSIS — D68.52 PROTHROMBIN GENE MUTATION (H): ICD-10-CM

## 2024-04-10 DIAGNOSIS — I82.5Y9 CHRONIC DEEP VEIN THROMBOSIS (DVT) OF PROXIMAL VEIN OF LOWER EXTREMITY, UNSPECIFIED LATERALITY (H): ICD-10-CM

## 2024-04-10 DIAGNOSIS — D68.51 FACTOR V LEIDEN CARRIER (H): ICD-10-CM

## 2024-04-17 ENCOUNTER — OFFICE VISIT (OUTPATIENT)
Dept: FAMILY MEDICINE | Facility: CLINIC | Age: 49
End: 2024-04-17
Payer: COMMERCIAL

## 2024-04-17 VITALS
BODY MASS INDEX: 44.41 KG/M2 | OXYGEN SATURATION: 98 % | DIASTOLIC BLOOD PRESSURE: 82 MMHG | HEART RATE: 99 BPM | SYSTOLIC BLOOD PRESSURE: 118 MMHG | WEIGHT: 293 LBS | TEMPERATURE: 99.4 F | RESPIRATION RATE: 18 BRPM | HEIGHT: 68 IN

## 2024-04-17 DIAGNOSIS — J01.90 ACUTE SINUSITIS WITH COEXISTING CONDITION, NEED PROPHYLACTIC TREATMENT: Primary | ICD-10-CM

## 2024-04-17 PROCEDURE — 99213 OFFICE O/P EST LOW 20 MIN: CPT | Performed by: FAMILY MEDICINE

## 2024-04-17 RX ORDER — CODEINE PHOSPHATE AND GUAIFENESIN 10; 100 MG/5ML; MG/5ML
1-2 SOLUTION ORAL EVERY 6 HOURS PRN
Qty: 118 ML | Refills: 0 | Status: SHIPPED | OUTPATIENT
Start: 2024-04-17

## 2024-04-17 ASSESSMENT — PAIN SCALES - GENERAL: PAINLEVEL: NO PAIN (0)

## 2024-04-17 NOTE — NURSING NOTE
"Chief Complaint   Patient presents with    Sinus Problem     /82 (Cuff Size: Adult Large)   Pulse 99   Temp 99.4  F (37.4  C) (Tympanic)   Resp 18   Ht 1.727 m (5' 8\")   Wt 144.2 kg (318 lb)   LMP  (LMP Unknown)   SpO2 98%   BMI 48.35 kg/m   Estimated body mass index is 48.35 kg/m  as calculated from the following:    Height as of this encounter: 1.727 m (5' 8\").    Weight as of this encounter: 144.2 kg (318 lb).  Patient presents to the clinic using No DME      Health Maintenance that is potentially due pending provider review:    Health Maintenance Due   Topic Date Due    ADVANCE CARE PLANNING  Never done    Pneumococcal Vaccine: Pediatrics (0 to 5 Years) and At-Risk Patients (6 to 64 Years) (1 of 2 - PCV) Never done    HEPATITIS B IMMUNIZATION (1 of 3 - 19+ 3-dose series) Never done    COVID-19 Vaccine (5 - 2023-24 season) 09/01/2023    YEARLY PREVENTIVE VISIT  11/17/2023    ANNUAL REVIEW OF HM ORDERS  03/27/2024                  "

## 2024-04-17 NOTE — PROGRESS NOTES
"  Assessment & Plan     Acute sinusitis with coexisting condition, need prophylactic treatment  Differentials discussed in detail and suspect symptoms secondary to acute sinusitis.  Augmentin and Robitussin AC prescribed.  Suggested well hydration, warm fluids, steam inhalation and to follow-up if symptoms persist or worsen.  Patient understood and in agreement with the above plan.  All questions answered.  - amoxicillin-clavulanate (AUGMENTIN) 875-125 MG tablet; Take 1 tablet by mouth 2 times daily for 7 days  - guaiFENesin-codeine (ROBITUSSIN AC) 100-10 MG/5ML solution; Take 5-10 mLs by mouth every 6 hours as needed for cough      Subjective   Anne is a 49 year old, presenting for the following health issues:  Sinus Problem    History of Present Illness       Reason for visit:  Cold, flu symptoms  Symptom onset:  3-7 days ago  Symptoms include:  Cough, congestion, body aches, sore throat  Symptom intensity:  Moderate  Symptom progression:  Staying the same    She eats 2-3 servings of fruits and vegetables daily.She consumes 0 sweetened beverage(s) daily.She exercises with enough effort to increase her heart rate 20 to 29 minutes per day.  She exercises with enough effort to increase her heart rate 5 days per week.   She is taking medications regularly.       Review of Systems  Constitutional, neuro, ENT, endocrine, pulmonary, cardiac, gastrointestinal, genitourinary, musculoskeletal, integument and psychiatric systems are negative, except as otherwise noted.      Objective    /82 (Cuff Size: Adult Large)   Pulse 99   Temp 99.4  F (37.4  C) (Tympanic)   Resp 18   Ht 1.727 m (5' 8\")   Wt 144.2 kg (318 lb)   LMP  (LMP Unknown)   SpO2 98%   BMI 48.35 kg/m    Body mass index is 48.35 kg/m .  Physical Exam   GENERAL: alert and no distress  EYES: Eyes grossly normal to inspection, PERRL and conjunctivae and sclerae normal  HENT: normal cephalic/atraumatic, ear canals and TM's normal, nasal mucosa edematous " , oropharynx clear, oral mucous membranes moist, and sinuses: maxillary tenderness on both sides  NECK: no adenopathy, no asymmetry, masses, or scars  RESP: lungs clear to auscultation - no rales, rhonchi or wheezes  CV: regular rates and rhythm, normal S1 S2, no S3 or S4, and no murmur, click or rub  MS: no gross musculoskeletal defects noted, no edema  NEURO: Normal strength and tone, mentation intact and speech normal  PSYCH: mentation appears normal, affect normal/bright       Signed Electronically by: Yrn Lopez MD

## 2024-05-15 ENCOUNTER — PATIENT OUTREACH (OUTPATIENT)
Dept: CARE COORDINATION | Facility: CLINIC | Age: 49
End: 2024-05-15
Payer: COMMERCIAL

## 2024-06-27 ENCOUNTER — HOSPITAL ENCOUNTER (OUTPATIENT)
Dept: MAMMOGRAPHY | Facility: CLINIC | Age: 49
Discharge: HOME | End: 2024-06-27
Attending: FAMILY MEDICINE
Payer: COMMERCIAL

## 2024-06-27 DIAGNOSIS — N63.20 MASS OF LEFT BREAST, UNSPECIFIED QUADRANT: ICD-10-CM

## 2024-06-27 PROCEDURE — 77065 DX MAMMO INCL CAD UNI: CPT | Mod: LT

## 2024-07-10 DIAGNOSIS — D68.52 PROTHROMBIN GENE MUTATION (H): ICD-10-CM

## 2024-07-10 DIAGNOSIS — D68.51 FACTOR V LEIDEN CARRIER (H): ICD-10-CM

## 2024-07-10 DIAGNOSIS — I82.5Y9 CHRONIC DEEP VEIN THROMBOSIS (DVT) OF PROXIMAL VEIN OF LOWER EXTREMITY, UNSPECIFIED LATERALITY (H): ICD-10-CM

## 2024-07-12 DIAGNOSIS — D68.52 PROTHROMBIN GENE MUTATION (H): ICD-10-CM

## 2024-07-12 DIAGNOSIS — D68.51 FACTOR V LEIDEN CARRIER (H): ICD-10-CM

## 2024-07-12 DIAGNOSIS — I82.5Y9 CHRONIC DEEP VEIN THROMBOSIS (DVT) OF PROXIMAL VEIN OF LOWER EXTREMITY, UNSPECIFIED LATERALITY (H): ICD-10-CM

## 2024-07-15 RX ORDER — RIVAROXABAN 20 MG/1
20 TABLET, FILM COATED ORAL
Qty: 90 TABLET | Refills: 0 | Status: SHIPPED | OUTPATIENT
Start: 2024-07-15

## 2024-07-15 RX ORDER — RIVAROXABAN 20 MG/1
20 TABLET, FILM COATED ORAL
Qty: 30 TABLET | Refills: 0 | OUTPATIENT
Start: 2024-07-15

## 2024-07-22 ENCOUNTER — VIRTUAL VISIT (OUTPATIENT)
Dept: CARDIOLOGY | Facility: CLINIC | Age: 49
End: 2024-07-22
Attending: PHYSICIAN ASSISTANT
Payer: COMMERCIAL

## 2024-07-22 VITALS — BODY MASS INDEX: 44.41 KG/M2 | HEIGHT: 68 IN | WEIGHT: 293 LBS

## 2024-07-22 DIAGNOSIS — E66.01 MORBID OBESITY (H): Primary | ICD-10-CM

## 2024-07-22 ASSESSMENT — PAIN SCALES - GENERAL: PAINLEVEL: MODERATE PAIN (5)
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
## 2024-07-22 NOTE — PATIENT INSTRUCTIONS
"Recommendations from today's MTM visit:                                                    MTM (medication therapy management) is a service provided by a clinical pharmacist designed to help you get the most of out of your medicines.      Remain on Zepbound 7.5 mg once weekly so long as you continue to have a positive weight response.  If you have a diminished response over time, increase to 10 mg once weekly.     Plan to call the MTM coordinator line at [phone] within the next month or 2 to schedule a follow-up visit with a comprehensive weight management MTM pharmacist in the new year.     If you have continued difficulties with muscle aches or soreness, it may be reasonable to consider transition from atorvastatin to rosuvastatin which has a lower tendency to cause these types of side effects.    It was great speaking with you today.  I value your experience and would be very thankful for your time in providing feedback in our clinic survey. In the next few days, you may receive an email or text message from [email or web address] with a link to a survey related to your  clinical pharmacist.\"     To schedule another MTM appointment, please call the clinic directly or you may call the MTM scheduling line at [phone].    My Clinical Pharmacist's contact information:                                                      Please feel free to contact me with any questions or concerns you have.      Pro Velasquez, PharmD, BCACP  Medication Therapy Management Pharmacist  [facility]    "

## 2024-07-22 NOTE — NURSING NOTE
Current patient location: [address] [place] [address] [address]  [facility] [address]    Is the patient currently in the state of [place]? YES    Visit mode:TELEPHONE    If the visit is dropped, the patient can be reconnected by: TELEPHONE VISIT: Phone number:   Telephone Information:   Mobile [phone]       Will anyone else be joining the visit? NO  (If patient encounters technical issues they should call [phone]    How would you like to obtain your AVS? MyChart    Are changes needed to the allergy or medication list? No    Are refills needed on medications prescribed by this physician? NO    Reason for visit: RECHECK    Pt states 5/10 hip pain on right side chronic today.    Jose ELLIOTT

## 2024-07-22 NOTE — Clinical Note
7/22/2024      RE: Anne Dai  [address] [place] [address] [address]  [facility] [address]       Dear Colleague,    Thank you for the opportunity to participate in the care of your patient, Anne Dai, at the [facility]. Please see a copy of my visit note below.    Medication Therapy Management (MTM) Encounter    ASSESSMENT:                            Medication Adherence/Access: {adherencechoices:819414}    Weight management: ***      PLAN:                            ***    SUBJECTIVE/OBJECTIVE:                          Anne Dai is a 49 year old female called for a follow-up visit.       Reason for visit: Zepbound follow up.    Allergies/ADRs: Reviewed in chart  Past Medical History: Reviewed in chart  Tobacco: She reports that she has never smoked. She has never been exposed to tobacco smoke. She has never used smokeless tobacco.      Medication Adherence/Access: no issues reported    Weight management:  Zepbound     Wt Readings from Last 4 Encounters:   04/17/24 144.2 kg (318 lb)   03/18/24 146.7 kg (323 lb 8 oz)   01/29/24 (!) 153.1 kg (337 lb 8 oz)   12/15/23 (!) 152.4 kg (336 lb)     Body Mass Index (BMI) There is no height or weight on file to calculate BMI.    Today's Vitals: There were no vitals taken for this visit.    Lab Results   Component Value Date    A1C 5.3 09/24/2023     History of DVT/Factor V Leiden:  Xarelto 20 mg every day     ASCVD:  Metorprolol succinate 50 mg every day   Atorvastatin 40 mg every day     ----------------      I spent *** minutes with this patient today. All changes were made via collaborative practice agreement with Janice Crawley PA-C . A copy of the visit note was provided to the patient's provider(s).    A summary of these recommendations was sent via [employer].    Pro Velasquez, PharmD, BCACP  Medication Therapy Management Pharmacist  [facility]     Telemedicine Visit Details  Type of  service:  Telephone visit  Start Time: {video/phone visit start time:152948}  End Time: {video/phone visit end time:152948}     Medication Therapy Recommendations  No medication therapy recommendations to display         Please do not hesitate to contact me if you have any questions/concerns.     Sincerely,     AGUSTIN STARK RPH

## 2024-08-08 ENCOUNTER — TELEPHONE (OUTPATIENT)
Dept: ENDOCRINOLOGY | Facility: CLINIC | Age: 49
End: 2024-08-08
Payer: COMMERCIAL

## 2024-08-08 NOTE — TELEPHONE ENCOUNTER
[facility]  Fax:[phone]  Spec: [phone]  [place]: [phone]

## 2024-08-09 NOTE — TELEPHONE ENCOUNTER
PA Initiation    Medication: ZEPBOUND 7.5 MG/0.5ML SC SOAJ  Insurance Company: [employer] - Phone [phone] Fax [phone]  Pharmacy Filling the Rx: [facility] - [place], [place] - [facility] [address]  Filling Pharmacy Phone: [phone]  Start Date: 8/9/2024

## 2024-08-12 NOTE — TELEPHONE ENCOUNTER
Prior Authorization Approval    Medication: ZEPBOUND 7.5 MG/0.5ML SC SOAJ  Authorization Effective Date: 8/12/2024  Authorization Expiration Date: 8/11/2025  Approved Dose/Quantity: See Image  Reference #: [id number]   Insurance Company: [employer] - Phone [phone] Fax [phone]  Expected CoPay: $    CoPay Card Available:      Financial Assistance Needed: NA  Which Pharmacy is filling the prescription: [facility] - [place], [place] - [address]  Pharmacy Notified: Yes  Patient Not[facility]: Yes, [facility]

## 2024-09-09 DIAGNOSIS — E78.5 DYSLIPIDEMIA: Chronic | ICD-10-CM

## 2024-09-10 RX ORDER — ATORVASTATIN CALCIUM 40 MG/1
40 TABLET, FILM COATED ORAL DAILY
Qty: 90 TABLET | Refills: 2 | Status: SHIPPED | OUTPATIENT
Start: 2024-09-10

## 2024-10-09 DIAGNOSIS — D68.51 FACTOR V LEIDEN CARRIER (H): ICD-10-CM

## 2024-10-09 DIAGNOSIS — I82.5Y9 CHRONIC DEEP VEIN THROMBOSIS (DVT) OF PROXIMAL VEIN OF LOWER EXTREMITY, UNSPECIFIED LATERALITY (H): ICD-10-CM

## 2024-10-09 DIAGNOSIS — D68.52 PROTHROMBIN GENE MUTATION (H): ICD-10-CM

## 2024-10-10 RX ORDER — RIVAROXABAN 20 MG/1
20 TABLET, FILM COATED ORAL
Qty: 90 TABLET | Refills: 0 | Status: SHIPPED | OUTPATIENT
Start: 2024-10-10

## 2024-10-10 NOTE — TELEPHONE ENCOUNTER
Requested Prescriptions   Pending Prescriptions Disp Refills    XARELTO ANTICOAGULANT 20 MG TABS tablet [Pharmacy Med Name: XARELTO 20MG TABS] 90 tablet 0     Sig: TAKE ONE TABLET BY MOUTH ONCE DAILY WITH DINNER       Anticoagulant Agents Failed - 10/9/2024  9:17 AM        Failed - Normal Platelets on file in past 12 months     Recent Labs   Lab Test 09/26/23  0351   *               Failed - Creatinine Clearance greater than 50 ml/min on file in past 3 mos               Failed - Serum creatinine less than or equal to 1.4 on file in past 3 mos     Recent Labs   Lab Test 09/25/23 2010   CR 0.82                 Failed - GFR on file in the past 12 months and most recent GFR is normal        Passed - Medication is active on med list        Passed - Recent (6 mo) or future (90 days) visit within the authorizing provider's specialty        Passed - Medication indicated for associated diagnosis     Medication is associated with one or more of the following diagnoses:  Atrial fibrillation  Deep venous thrombosis  Heparin-induced thrombocytopenia  Pulmonary embolism  Venous thromboembolism  H/O: Pulmonary embolus  Atrial flutter  Coronary artery finding  Transient cerebral ischemia  Percutaneous transluminal coronary angioplasty  Stable angina  Intermittent claudication  Arteriosclerotic vascular disease          Passed - Patient is 18 years of age or older        Passed - No active pregnancy on record        Passed - No positive pregnancy test within past 12 months

## 2024-11-22 PROBLEM — L97.121: Status: ACTIVE | Noted: 2024-11-22

## 2024-11-22 PROBLEM — L97.121: Status: RESOLVED | Noted: 2024-11-22 | Resolved: 2024-11-22

## 2024-11-22 PROBLEM — I82.5Y9 CHRONIC DEEP VEIN THROMBOSIS (DVT) OF PROXIMAL VEIN OF LOWER EXTREMITY, UNSPECIFIED LATERALITY (H): Status: RESOLVED | Noted: 2018-10-31 | Resolved: 2024-11-22

## 2024-12-06 ENCOUNTER — HOSPITAL ENCOUNTER (OUTPATIENT)
Dept: MAMMOGRAPHY | Facility: CLINIC | Age: 49
Discharge: HOME | End: 2024-12-06
Attending: FAMILY MEDICINE | Admitting: FAMILY MEDICINE
Payer: COMMERCIAL

## 2024-12-06 DIAGNOSIS — Z12.31 VISIT FOR SCREENING MAMMOGRAM: ICD-10-CM

## 2024-12-06 PROCEDURE — 77063 BREAST TOMOSYNTHESIS BI: CPT

## 2024-12-06 PROCEDURE — 77067 SCR MAMMO BI INCL CAD: CPT

## 2024-12-31 ENCOUNTER — VIRTUAL VISIT (OUTPATIENT)
Dept: PHARMACY | Facility: CLINIC | Age: 49
End: 2024-12-31
Attending: FAMILY MEDICINE
Payer: COMMERCIAL

## 2024-12-31 VITALS — BODY MASS INDEX: 43.65 KG/M2 | WEIGHT: 287.1 LBS

## 2024-12-31 DIAGNOSIS — E66.01 MORBID OBESITY (H): ICD-10-CM

## 2024-12-31 NOTE — PATIENT INSTRUCTIONS
Recommendations from today's MTM visit:                                                    Continue Zepbound 10 mg - sent additional prescription for 2 additional months to be processed today. Override approved by [employer].   Education provided today of [employer] insurance requirements changing - stopping GLP1/GIP Agonist coverage for weight management in 2025.   Discussed options for continuing GLP1/GIP agonist in 2025.  Pay out of pocket for Wegovy or Zepbound pens (>$1000/month cash price without savings card)   Zepbound cost with Zepbound savings card (link below to sign up for this): $650/month with card  https://[email or web address].[email or web address].com/enroll/checkEnrollment  Wegovy cost with Wegovy savings card (link below to sign up for this): $650/month with card   https://www.[employer]/coverage-and-savings/save-on-wegovy.html  Compounded semaglutide (same active ingredient as Wegovy) from [facility] ($230/month for doses of 1mg or less; $370/month for doses higher than 1mg)  This is an available option for as long as Wegovy is on FDA shortage list, Wegovy has been on the list for the last several months with no foreseeable end in sight as of now   Zepbound Vials through [facility] - vials only available in 2.5 mg and 5 mg doses  $399/month for 2.5mg vials  $549/month for 5mg vials   $5 per month for administrations supplies (syringe/needles, etc)     Share information regarding safety with compounding pharmacists with patient.   Messaged clinic coordinators to schedule establish care visit with Comprehensive [facility] provider     Follow-up: 2-3 months with Comprehensive [facility] provider or primary care provider     It was great speaking with you today.  I value your experience and would be very thankful for your time in providing feedback in our clinic survey. In the next few days, you may receive an email or text message from [employer] with a link to  "a survey related to your  clinical pharmacist.\"     To schedule another MTM appointment, please call the clinic directly or you may call the MTM scheduling line at [phone] or toll-free at [phone].     My Clinical Pharmacist's contact information:                                                      Please feel free to contact me with any questions or concerns you have.      Jeimy Sinclair, PharmD, [facility]  Medication Therapy Management (MTM) Pharmacist   [facility]     "

## 2024-12-31 NOTE — PROGRESS NOTES
Medication Therapy Management (MTM) Encounter    ASSESSMENT:                            Medication Adherence/Access: See below for considerations.    Weight management     Due to [employer] [facility] insurance discontinuing coverage of GLP1 agonists for Weight Management in 2025 year, much of today's discussion was spent discussing next steps including alternative injectable options - paying out of pocket w/ savings card cost, compound product through [place] mail order pharmacy, and others. From this, patient wishing to transition to compound semaglutide through [facility] after current supply of Zepbound is out.      PLAN:                            Continue Zepbound 10 mg - sent additional prescription for 2 additional months to be processed today. Override approved by [employer].   Education provided today of [facility] insurance requirements changing - stopping GLP1/GIP Agonist coverage for weight management in 2025.   Discussed options for continuing GLP1/GIP agonist in 2025.  Pay out of pocket for Wegovy or Zepbound pens (>$1000/month cash price without savings card)   Zepbound cost with Zepbound savings card (link below to sign up for this): $650/month with card  https://www.enrollment.zepbound.[employer]/enroll/checkEnrollment  Wegovy cost with Wegovy savings card (link below to sign up for this): $650/month with card   https://www.[employer]/coverage-and-savings/save-on-wegovy.html  Compounded semaglutide (same active ingredient as Wegovy) from [facility] ($230/month for doses of 1mg or less; $370/month for doses higher than 1mg)  This is an available option for as long as Wegovy is on FDA shortage list, Wegovy has been on the list for the last several months with no foreseeable end in sight as of now   Zepbound Vials through [facility] - vials only available in 2.5 mg and 5 mg doses  $399/month for 2.5mg vials  $549/month for 5mg vials   $5  "per month for administrations supplies (syringe/needles, etc)     Share information regarding safety with compounding pharmacists with patient.   Messaged clinic coordinators to schedule establish care visit with Comprehensive Weight Management provider     Follow-up: 2-3 months with Comprehensive Weight Management provider or primary care provider     SUBJECTIVE/OBJECTIVE:                          Anne Dai is a 49 year old female seen for a follow-up visit.  Last seen by Dusty Velasquez, PharmD.     Reason for visit: Zepbound follow-up.    Allergies/ADRs: Reviewed in chart  Past Medical History: Reviewed in chart  Tobacco: She reports that she has never smoked. She has never been exposed to tobacco smoke. She has never used smokeless tobacco.  Alcohol: occasionally     Medication Adherence/Access: GLP-1 RA not covered on formulary next year    Weight Management   Zepbound 10 mg once weekly x multiple months  - receiving refill delivery today  - food noise increasing, appetite increasing slightly     Patient reports no current medication side effects.  Nutrition/Eating Habits:     - food feels normal, not something that feels overbearing   - decrease in food noise has been most impactful  - eating less, hirsch faster  - learning how body responds to different foods - feels better with better foods  - food doesn't have to be the focus of an event    Exercise/Activity:      Medications Tried/Failed:  None.     Initial Consult Weight: 337 lbs (1/29/24)       Current weight today: 287 lbs 1.6 oz  Cumulative Weight Loss: -50 lb, -14.8% from baseline    Wt Readings from Last 4 Encounters:   12/31/24 287 lb 1.6 oz (130.2 kg)   11/22/24 294 lb 12.8 oz (133.7 kg)   07/22/24 307 lb 4.8 oz (139.4 kg)   04/17/24 318 lb (144.2 kg)     Estimated body mass index is 43.65 kg/m  as calculated from the following:    Height as of 11/22/24: 5' 8\" (1.727 m).    Weight as of this encounter: 287 lb 1.6 oz (130.2 kg).        Today's Vitals: Wt " 287 lb 1.6 oz (130.2 kg)   BMI 43.65 kg/m    ----------------      I spent 22 minutes with this patient today. All changes were made via collaborative practice agreement with Yrn Lopez and Janice Crawley PA-C as one of the credentialed prescriber of the [facility] MTM Weight Mgmt Program.      A summary of these recommendations was sent via [employer].    Niki MartinezD, BCACP  Medication Therapy Management (MTM) Pharmacist   [facility]     Telemedicine Visit Details  The patient's medications can be safely assessed via a telemedicine encounter.  Type of service:  Telephone visit  Originating Location (pt. Location): Home    Distant Location (provider location):  Off-site  Start Time: 8:23 AM  End Time: 8:55 AM     Medication Therapy Recommendations  Obesity   1 Rationale: Medication product not available - Adherence - Adherence   Recommendation: Provide Education   Status: Patient Agreed - Adherence/Education   Identified Date: 12/31/2024 Completed Date: 12/31/2024

## 2024-12-31 NOTE — NURSING NOTE
Is the patient currently in the state of [place]? YES    Location: home    Visit mode:TELEPHONE    If the visit is dropped, the patient can be reconnected by: TELEPHONE VISIT: Phone number:   Telephone Information:   Mobile [phone]       Will anyone else be joining the visit? NO  (If patient encounters technical issues they should call [phone])    Are changes needed to the allergy or medication list? No    Are refills needed on medications prescribed by this physician? NO    Reason for visit: Medication Therapy Management      Edith Montgomery, Virtual Visit Facilitator    QNR Status: NA

## 2024-12-31 NOTE — PROGRESS NOTES
"Virtual Visit Details    Type of service:  Telephone Visit   Phone call duration: *** minutes   Originating Location (pt. Location): {patient location:844769::\"Home\"}  {PROVIDER LOCATION On-site should be selected for visits conducted from your clinic location or adjoining [facility] hospital, academic office, or other nearby [facility] building. Off-site should be selected for all other provider locations, including home:[id number]}  Distant Location (provider location):  {virtual location provider:262700}  "

## 2025-01-03 ENCOUNTER — OFFICE VISIT (OUTPATIENT)
Dept: ORTHOPEDICS | Facility: CLINIC | Age: 50
End: 2025-01-03
Payer: COMMERCIAL

## 2025-01-03 VITALS — WEIGHT: 291 LBS | BODY MASS INDEX: 44.1 KG/M2 | HEIGHT: 68 IN

## 2025-01-03 DIAGNOSIS — M25.551 ACUTE RIGHT HIP PAIN: Primary | ICD-10-CM

## 2025-01-03 DIAGNOSIS — M16.11 PRIMARY OSTEOARTHRITIS OF RIGHT HIP: ICD-10-CM

## 2025-01-03 PROCEDURE — 99214 OFFICE O/P EST MOD 30 MIN: CPT | Mod: 25 | Performed by: FAMILY MEDICINE

## 2025-01-03 PROCEDURE — 20611 DRAIN/INJ JOINT/BURSA W/US: CPT | Mod: RT | Performed by: FAMILY MEDICINE

## 2025-01-03 RX ADMIN — TRIAMCINOLONE ACETONIDE 40 MG: 40 INJECTION, SUSPENSION INTRA-ARTICULAR; INTRAMUSCULAR at 15:36

## 2025-01-03 RX ADMIN — ROPIVACAINE HYDROCHLORIDE 3 ML: 5 INJECTION, SOLUTION EPIDURAL; INFILTRATION; PERINEURAL at 15:36

## 2025-01-03 NOTE — LETTER
1/3/2025      Anne Dai  [address]  [place] [address]      Dear Colleague,    Thank you for referring your patient, Anne Dai, to the [facility]. Please see a copy of my visit note below.    Anne Cortez  :  1975  DOS: 25  MRN: [id number]    Sports Medicine Clinic Visit    PCP: Yrn Lopez    Anne Cortez is a 46 year old female who is seen as a self referral presenting with right hip pain.    Injury: Gradual onset of pain over the past 3 years. MVA in 2018 but notes hip was not hurt in the accident but did affect the lower back. Pain located over right groin and right buttock with radiation to posterior mid-thigh. Typically occurs at night when sleeping. Notes stiffness over the anterior right hip and has compensated by walking in a hip flexed position. Additional Features:  Positive: stiffness, instability, weakness and pain.  Symptoms are better with stretches and eliptical. Afterwards, is stiff and sore.  Symptoms are worse with: after physical activity. Recent imaging completed: No recent imaging completed.  Prior History of related problems: Lumbar back bulging disks and PT around 2019.    Social History: currently employed as Works for [employer] in [employer]    Interim History - January 3, 2025  Since last visit on 2022 patient has moderate-severe right hip joint pain over the past ~ 1+ years.  Right hip intra-articular injection completed on 2022 provided relief for 6 + months.  Patient noted some relief with physical therapy, discontinued due to family issues.  Patient is currently working on weight loss.  No new injury in the interim.    Review of Systems  Musculoskeletal: as above  Remainder of review of systems is negative including constitutional, CV, pulmonary, GI, Skin and Neurologic except as noted in HPI or medical history.    Past Medical History:   Diagnosis Date     Cervical high risk HPV (human  Writer was notified from [employer] papillomavirus) test positive 6/2015    Neg 16/18     Cervical high risk HPV (human papillomavirus) test positive 8/3/16    types 16,18 & other HR HPV     Cervical high risk HPV (human papillomavirus) test positive 08/09/2017    type 18     NELSON 2-3 2010    s/p LEEP - Annual pap smears indicated     Factor V Leiden carrier (H) 10/31/2018     Factor V Leiden carrier (H)      H/O colposcopy with cervical biopsy 6/2015    Bx - LSIL, ECC - benign     History of blood transfusion Sept 28, 2018    Motor vehicle accident     History of colposcopy with cervical biopsy 9/13/16    bx & ECC - negative     NSTEMI (non-ST elevated myocardial infarction) (H) 9/24/2023     Papanicolaou smear of cervix with low grade squamous intraepithelial lesion (LGSIL) 08/09/2017     Prothrombin gene mutation (H)      Skin cancer      Past Surgical History:   Procedure Laterality Date     CV CORONARY ANGIOGRAM N/A 9/26/2023    Procedure: Coronary Angiogram;  Surgeon: Savage Mcgregor MD;  Location:  HEART CARDIAC CATH LAB     CV PCI N/A 9/26/2023    Procedure: Percutaneous Coronary Intervention;  Surgeon: Savage Mcgregor MD;  Location:  HEART CARDIAC CATH LAB     EYE SURGERY      Lasix 4-27-12 Both eye     IR AORTIC ARCH 4 VESSEL ANGIOGRAM  9/29/2018     LEEP TX, CERVICAL  03/22/2010    NELSON 2 - needs yearly paps     SURGICAL HISTORY OF -       FACIAL RECONSTRUCTION AFTER MVA     VASCULAR SURGERY  09/29/2018    Torn aorta repair     Family History   Problem Relation Age of Onset     C.A.D. Father      Hypertension Father      Heart Disease Father      Diabetes Father         type II     Coronary Artery Disease Father      Hyperlipidemia Father      Cerebrovascular Disease Father      Diabetes Brother         type II     Cancer Mother 69        lung     Thyroid Disease Mother      Other Cancer Mother         Lung cancer       Objective  Ht 1.727 m (5' 8\")   Wt 132 kg (291 lb)   BMI 44.25 kg/m      General: healthy, alert and in no " distress    HEENT: no scleral icterus or conjunctival erythema   Skin: no suspicious lesions or rash. No jaundice.   CV: regular rhythm by palpation, 2+ distal pulses, no pedal edema    Resp: normal respiratory effort without conversational dyspnea   Psych: normal mood and affect    Gait: antalgic, appropriate coordination and balance   Neuro: normal light touch sensory exam of the extremities. Motor strength as noted below     Right hip exam    Inspection:        no edema or ecchymosis in hip area    ROM:       Flexion 100       internal rotation 15      external rotation 30      Range of motion limited by pain    Strength:        flexion 5/5       extension 5/5       abduction 5/5       adduction 5/5    Tender:        greater trochanter mild       Anterior hip joint most focal    Non Tender:        remainder of hip area       illiac crest       ASIS    Sensation:        grossly intact in hip and thigh    Skin:       well perfused       capillary refill brisk    Special Tests:        neg (-) BERENCIE       positive (+) FADIR       positive (+) scour    Radiology  Recent Results (from the past 744 hour(s))   XR Pelvis w Hip RT 1 View    Narrative    XR PELVIS AND HIP RIGHT 1 VIEW 2/23/2022 8:53 AM    HISTORY: Right hip pain    COMPARISON: None.    FINDINGS:   No fracture or dislocation. There are advanced degenerative changes in  the right hip. Moderate degenerative changes in the left hip. IUD in  the pelvis.    OH THORNE MD         SYSTEM ID:  [id number]       Large Joint Injection/Arthocentesis: R hip joint    Date/Time: 1/3/2025 3:36 PM    Performed by: Jose Granados DO  Authorized by: Jose Granados DO    Indications:  Pain, diagnostic evaluation and osteoarthritis  Needle Size:  22 G  Guidance: ultrasound    Approach:  Anterior  Location:  Hip      Site:  R hip joint  Medications:  40 mg triamcinolone 40 MG/ML; 3 mL ROPivacaine 5 MG/ML  Outcome:  Tolerated well, no immediate  complications  Procedure discussed: discussed risks, benefits, and alternatives    Consent Given by:  Patient  Timeout: timeout called immediately prior to procedure    Prep: patient was prepped and draped in usual sterile fashion     4 ml's of 1% lidocaine was used as local anesthetic prior to injection  Ultrasound images of procedure were permanently stored.       Assessment:  1. Acute right hip pain    2. Primary osteoarthritis of right hip          Plan:  Discussed the assessment with the patient.  Follow up: prn based on clinical progress  XR images independently visualized and reviewed with patient today in clinic  Severe right hip degenerative change present, also present on the left but more mild/moderate  Low impact activity strategies reviewed  Goals for strength, stability and wt loss reviewed  After discussion of relative risks and limitations, US guided CSI to right hip joint today  Consider further referral to orthopedic surgery in the future prn for [employer] discussion, but would may try to postpone as long as possible given age  PT ordered previously, can provide new order if needed, she will restart HEP and work progressively  We also discussed use of injections if needed before upcoming trips, and that if they were required it would suggest less quality and duration of CSI and likely time to consider surgical consultation, she expressed understanding  We discussed modified progressive pain-free activity as tolerated  Home handouts provided and supportive care reviewed  All questions were answered today  Contact us with additional questions or concerns  Signs and sx of concern reviewed      Jose Granados DO, CAQ  Sports Medicine Physician  [facility]      Time spent in chart review, one-on-one evaluation, discussion with patient regarding: nature of problem, clinical course, prior treatments, therapeutic options, shared-decision making, potential procedures and  referrals, and charting related to the visit: 32 minutes.  If applicable, time does not include time spent performing any procedure.          Disclaimer: This note consists of symbols derived from keyboarding, dictation and/or voice recognition software. As a result, there may be errors in the script that have gone undetected. Please consider this when interpreting information found in this chart.      Again, thank you for allowing me to participate in the care of your patient.        Sincerely,        Jose Granados, DO    Electronically signed

## 2025-01-06 RX ORDER — ROPIVACAINE HYDROCHLORIDE 5 MG/ML
3 INJECTION, SOLUTION EPIDURAL; INFILTRATION; PERINEURAL
Status: COMPLETED | OUTPATIENT
Start: 2025-01-03 | End: 2025-01-03

## 2025-01-06 RX ORDER — TRIAMCINOLONE ACETONIDE 40 MG/ML
40 INJECTION, SUSPENSION INTRA-ARTICULAR; INTRAMUSCULAR
Status: COMPLETED | OUTPATIENT
Start: 2025-01-03 | End: 2025-01-03

## 2025-01-07 ENCOUNTER — THERAPY VISIT (OUTPATIENT)
Dept: PHYSICAL THERAPY | Facility: CLINIC | Age: 50
End: 2025-01-07
Attending: FAMILY MEDICINE
Payer: COMMERCIAL

## 2025-01-07 DIAGNOSIS — G89.29 CHRONIC PAIN OF RIGHT HIP: ICD-10-CM

## 2025-01-07 DIAGNOSIS — M25.551 CHRONIC PAIN OF RIGHT HIP: ICD-10-CM

## 2025-01-07 DIAGNOSIS — M16.11 PRIMARY OSTEOARTHRITIS OF RIGHT HIP: ICD-10-CM

## 2025-01-07 PROCEDURE — 97140 MANUAL THERAPY 1/> REGIONS: CPT | Mod: GP | Performed by: PHYSICAL THERAPIST

## 2025-01-07 PROCEDURE — 97162 PT EVAL MOD COMPLEX 30 MIN: CPT | Mod: GP | Performed by: PHYSICAL THERAPIST

## 2025-01-07 PROCEDURE — 97110 THERAPEUTIC EXERCISES: CPT | Mod: GP | Performed by: PHYSICAL THERAPIST

## 2025-01-07 PROCEDURE — 97530 THERAPEUTIC ACTIVITIES: CPT | Mod: GP | Performed by: PHYSICAL THERAPIST

## 2025-01-07 ASSESSMENT — ACTIVITIES OF DAILY LIVING (ADL)
GOING_UP_1_FLIGHT_OF_STAIRS: MODERATE DIFFICULTY
SWINGING_OBJECTS_LIKE_A_GOLF_CLUB: EXTREME DIFFICULTY
TWISTING/PIVOTING_ON_INVOLVED_LEG: UNABLE TO DO
SPORTS_COUNT: 9
WALKING_DOWN_STEEP_HILLS: EXTREME DIFFICULTY
ADL_HIGHEST_POTENTIAL_SCORE: 68
STARTING_AND_STOPPING_QUICKLY: MODERATE DIFFICULTY
WALKING_INITIALLY: MODERATE DIFFICULTY
TWISTING/PIVOTING ON INVOLVED LEG: UNABLE TO DO
ADL_SCORE(%): 0
WALKING_APPROXIMATELY_10_MINUTES: MODERATE DIFFICULTY
HOW_WOULD_YOU_RATE_YOUR_CURRENT_LEVEL_OF_FUNCTION_DURING_YOUR_USUAL_ACTIVITIES_OF_DAILY_LIVING_FROM_0_TO_100_WITH_100_BEING_YOUR_LEVEL_OF_FUNCTION_PRIOR_TO_YOUR_HIP_PROBLEM_AND_0_BEING_THE_INABILITY_TO_PERFORM_ANY_OF_YOUR_USUAL_DAILY_ACTIVITIES?: 15
WALKING_INITIALLY: MODERATE DIFFICULTY
WALKING_UP_STEEP_HILLS: EXTREME DIFFICULTY
WALKING_UP_STEEP_HILLS: EXTREME DIFFICULTY
JUMPING: UNABLE TO DO
GETTING_INTO_AND_OUT_OF_AN_AVERAGE_CAR: MODERATE DIFFICULTY
ROLLING OVER IN BED: EXTREME DIFFICULTY
WALKING_15_MINUTES_OR_GREATER: EXTREME DIFFICULTY
HOS_ADL_SCORE(%): 30.88
RECREATIONAL_ACTIVITIES: EXTREME DIFFICULTY
GOING_DOWN_1_FLIGHT_OF_STAIRS: MODERATE DIFFICULTY
ADL_TOTAL_ITEM_SCORE: 0
SPORTS_TOTAL_ITEM_SCORE: 0
STEPPING_UP_AND_DOWN_CURBS: MODERATE DIFFICULTY
RECREATIONAL ACTIVITIES: EXTREME DIFFICULTY
ABILITY_TO_PERFORM_ACTIVITY_WITH_YOUR_NORMAL_TECHNIQUE: MODERATE DIFFICULTY
HEAVY_WORK: EXTREME DIFFICULTY
DEEP SQUATTING: UNABLE TO DO
LIGHT_TO_MODERATE_WORK: MODERATE DIFFICULTY
ADL_COUNT: 17
STEPPING UP AND DOWN CURBS: MODERATE DIFFICULTY
SPORTS_HIGHEST_POTENTIAL_SCORE: 36
GOING UP 1 FLIGHT OF STAIRS: MODERATE DIFFICULTY
PLEASE_INDICATE_YOR_PRIMARY_REASON_FOR_REFERRAL_TO_THERAPY:: HIP
STANDING FOR 15 MINUTES: EXTREME DIFFICULTY
WALKING_DOWN_STEEP_HILLS: EXTREME DIFFICULTY
WALKING_15_MINUTES_OR_GREATER: EXTREME DIFFICULTY
PUTTING ON SOCKS AND SHOES: MODERATE DIFFICULTY
HEAVY_WORK: EXTREME DIFFICULTY
LANDING: UNABLE TO DO
HOW_WOULD_YOU_RATE_YOUR_CURRENT_LEVEL_OF_FUNCTION_DURING_YOUR_USUAL_ACTIVITIES_OF_DAILY_LIVING_FROM_0_TO_100_WITH_100_BEING_YOUR_LEVEL_OF_FUNCTION_PRIOR_TO_YOUR_HIP_PROBLEM_AND_0_BEING_THE_INABILITY_TO_PERFORM_ANY_OF_YOUR_USUAL_DAILY_ACTIVITIES?: 15
LIGHT_TO_MODERATE_WORK: MODERATE DIFFICULTY
ROLLING_OVER_IN_BED: EXTREME DIFFICULTY
HOW_WOULD_YOU_RATE_YOUR_CURRENT_LEVEL_OF_FUNCTION_DURING_YOUR_SPORTS_RELATED_ACTIVITIES_FROM_0_TO_100_WITH_100_BEING_YOUR_LEVEL_OF_FUNCTION_PRIOR_TO_YOUR_HIP_PROBLEM_AND_0_BEING_THE_INABILITY_TO_PERFORM_ANY_OF_YOUR_USUAL_DAILY_ACTIVITIES?: 10
SITTING_FOR_15_MINUTES: SLIGHT DIFFICULTY
WALKING_FOR_APPROXIMATELY_10_MINUTES: MODERATE DIFFICULTY
PUTTING_ON_SOCKS_AND_SHOES: MODERATE DIFFICULTY
GOING DOWN 1 FLIGHT OF STAIRS: MODERATE DIFFICULTY
RUNNING_ONE_MILE: UNABLE TO DO
HOW_WOULD_YOU_RATE_YOUR_CURRENT_LEVEL_OF_FUNCTION?: SEVERELY ABNORMAL
ABILITY_TO_PARTICIPATE_IN_YOUR_DESIRED_SPORT_AS_LONG_AS_YOU_WOULD_LIKE: UNABLE TO DO
SITTING FOR 15 MINUTES: SLIGHT DIFFICULTY
HOS_ADL_HIGHEST_POTENTIAL_SCORE: 68
GETTING_INTO_AND_OUT_OF_A_BATHTUB: UNABLE TO DO
HOS_ADL_ITEM_SCORE_TOTAL: 21
STANDING_FOR_15_MINUTES: EXTREME DIFFICULTY
CUTTING/LATERAL_MOVEMENTS: EXTREME DIFFICULTY
GETTING_INTO_AND_OUT_OF_A_BATHTUB: UNABLE TO DO
GETTING INTO AND OUT OF AN AVERAGE CAR: MODERATE DIFFICULTY
DEEP_SQUATTING: UNABLE TO DO
LOW_IMPACT_ACTIVITIES_LIKE_FAST_WALKING: EXTREME DIFFICULTY
SPORTS_SCORE(%): 0
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
## 2025-01-08 DIAGNOSIS — D68.51 FACTOR V LEIDEN CARRIER: ICD-10-CM

## 2025-01-08 DIAGNOSIS — D68.52 PROTHROMBIN GENE MUTATION: ICD-10-CM

## 2025-01-08 DIAGNOSIS — I82.5Y9 CHRONIC DEEP VEIN THROMBOSIS (DVT) OF PROXIMAL VEIN OF LOWER EXTREMITY, UNSPECIFIED LATERALITY (H): ICD-10-CM

## 2025-01-08 RX ORDER — RIVAROXABAN 20 MG/1
20 TABLET, FILM COATED ORAL
Qty: 90 TABLET | Refills: 1 | Status: SHIPPED | OUTPATIENT
Start: 2025-01-08

## 2025-01-08 NOTE — TELEPHONE ENCOUNTER
Requested Prescriptions   Pending Prescriptions Disp Refills    XARELTO ANTICOAGULANT 20 MG TABS tablet [Pharmacy Med Name: XARELTO 20MG TABS] 90 tablet 0     Sig: TAKE ONE TABLET BY MOUTH ONCE DAILY WITH DINNER       Anticoagulant Agents Failed - 1/8/2025  1:52 PM        Failed - Creatinine Clearance greater than 50 ml/min on file in past 3 mos     No lab results found.          Passed - Normal Platelets on file in past 12 months     Recent Labs   Lab Test 11/22/24  0846                  Passed - Medication is active on med list        Passed - Serum creatinine less than or equal to 1.4 on file in past 3 mos     Recent Labs   Lab Test 11/22/24  0846   CR 0.98*                 Passed - GFR on file in the past 12 months and most recent GFR is normal        Passed - Recent (6 mo) or future (90 days) visit within the authorizing provider's specialty        Passed - Medication indicated for associated diagnosis     Medication is associated with one or more of the following diagnoses:  Atrial fibrillation  Deep venous thrombosis  Heparin-induced thrombocytopenia  Pulmonary embolism  Venous thromboembolism  H/O: Pulmonary embolus  Atrial flutter  Coronary artery finding  Transient cerebral ischemia  Percutaneous transluminal coronary angioplasty  Stable angina  Intermittent claudication  Arteriosclerotic vascular disease          Passed - Patient is 18 years of age or older        Passed - No active pregnancy on record        Passed - No positive pregnancy test within past 12 months           Last office visit: 11/22/2024 ; last virtual visit: 1/2/2024 with prescribing provider:     Future Office Visit:      Julie Behrendt RN

## 2025-01-23 PROBLEM — R07.9 CHEST PAIN, UNSPECIFIED TYPE: Status: ACTIVE | Noted: 2025-01-23

## 2025-01-23 PROBLEM — S25.01XD: Status: ACTIVE | Noted: 2025-01-23

## 2025-01-24 PROBLEM — R00.2 PALPITATIONS: Status: ACTIVE | Noted: 2025-01-24

## 2025-01-31 ENCOUNTER — HOSPITAL ENCOUNTER (OUTPATIENT)
Dept: CARDIOLOGY | Facility: CLINIC | Age: 50
Discharge: HOME | End: 2025-01-31
Attending: NURSE PRACTITIONER | Admitting: NURSE PRACTITIONER
Payer: COMMERCIAL

## 2025-01-31 VITALS — DIASTOLIC BLOOD PRESSURE: 82 MMHG | HEART RATE: 76 BPM | SYSTOLIC BLOOD PRESSURE: 142 MMHG

## 2025-01-31 DIAGNOSIS — S25.01XD: ICD-10-CM

## 2025-01-31 PROCEDURE — 71275 CT ANGIOGRAPHY CHEST: CPT

## 2025-01-31 PROCEDURE — 250N000011 HC RX IP 250 OP 636: Performed by: NURSE PRACTITIONER

## 2025-01-31 RX ORDER — LIDOCAINE 40 MG/G
CREAM TOPICAL
Status: DISCONTINUED | OUTPATIENT
Start: 2025-01-31 | End: 2025-02-01 | Stop reason: HOSPADM

## 2025-01-31 RX ORDER — IOPAMIDOL 755 MG/ML
500 INJECTION, SOLUTION INTRAVASCULAR ONCE
Status: COMPLETED | OUTPATIENT
Start: 2025-01-31 | End: 2025-01-31

## 2025-01-31 RX ORDER — ONDANSETRON 2 MG/ML
4 INJECTION INTRAMUSCULAR; INTRAVENOUS
Status: DISCONTINUED | OUTPATIENT
Start: 2025-01-31 | End: 2025-02-01 | Stop reason: HOSPADM

## 2025-01-31 RX ADMIN — IOPAMIDOL 100 ML: 755 INJECTION, SOLUTION INTRAVENOUS at 10:31

## 2025-01-31 NOTE — RESULT ENCOUNTER NOTE
Widely patient thoracic aortic stent graft; unchanged from previous exam per reader. Pt notified via [employer]

## 2025-02-11 ENCOUNTER — THERAPY VISIT (OUTPATIENT)
Dept: PHYSICAL THERAPY | Facility: CLINIC | Age: 50
End: 2025-02-11
Payer: COMMERCIAL

## 2025-02-11 DIAGNOSIS — M16.11 PRIMARY OSTEOARTHRITIS OF RIGHT HIP: Primary | ICD-10-CM

## 2025-02-11 PROCEDURE — 97140 MANUAL THERAPY 1/> REGIONS: CPT | Mod: GP | Performed by: PHYSICAL THERAPIST

## 2025-02-11 PROCEDURE — 97110 THERAPEUTIC EXERCISES: CPT | Mod: GP | Performed by: PHYSICAL THERAPIST

## 2025-03-02 ENCOUNTER — HEALTH MAINTENANCE LETTER (OUTPATIENT)
Age: 50
End: 2025-03-02

## 2025-03-04 ENCOUNTER — THERAPY VISIT (OUTPATIENT)
Dept: PHYSICAL THERAPY | Facility: CLINIC | Age: 50
End: 2025-03-04
Payer: COMMERCIAL

## 2025-03-04 DIAGNOSIS — M16.11 PRIMARY OSTEOARTHRITIS OF RIGHT HIP: Primary | ICD-10-CM

## 2025-03-04 PROCEDURE — 97140 MANUAL THERAPY 1/> REGIONS: CPT | Mod: GP | Performed by: PHYSICAL THERAPIST

## 2025-03-04 PROCEDURE — 97110 THERAPEUTIC EXERCISES: CPT | Mod: GP | Performed by: PHYSICAL THERAPIST

## 2025-03-15 DIAGNOSIS — R07.9 CHEST PAIN, UNSPECIFIED TYPE: ICD-10-CM

## 2025-03-17 RX ORDER — METOPROLOL SUCCINATE 50 MG/1
50 TABLET, EXTENDED RELEASE ORAL DAILY
Qty: 90 TABLET | Refills: 3 | Status: SHIPPED | OUTPATIENT
Start: 2025-03-17

## 2025-03-17 NOTE — TELEPHONE ENCOUNTER
[facility] Cardiology Refill Guideline reviewed.  Medication meets criteria for refill.    Lisa Clrak RN

## 2025-03-19 ENCOUNTER — OFFICE VISIT (OUTPATIENT)
Dept: ORTHOPEDICS | Facility: CLINIC | Age: 50
End: 2025-03-19
Payer: COMMERCIAL

## 2025-03-19 DIAGNOSIS — E66.01 MORBID OBESITY (H): ICD-10-CM

## 2025-03-19 DIAGNOSIS — M16.11 PRIMARY OSTEOARTHRITIS OF RIGHT HIP: Primary | ICD-10-CM

## 2025-03-19 DIAGNOSIS — G89.29 CHRONIC PAIN OF RIGHT HIP: ICD-10-CM

## 2025-03-19 DIAGNOSIS — R26.89 IMPAIRED GAIT AND MOBILITY: ICD-10-CM

## 2025-03-19 DIAGNOSIS — M25.551 CHRONIC PAIN OF RIGHT HIP: ICD-10-CM

## 2025-03-19 PROCEDURE — 99213 OFFICE O/P EST LOW 20 MIN: CPT | Mod: 25 | Performed by: FAMILY MEDICINE

## 2025-03-19 PROCEDURE — 20611 DRAIN/INJ JOINT/BURSA W/US: CPT | Mod: RT | Performed by: FAMILY MEDICINE

## 2025-03-19 RX ORDER — TRIAMCINOLONE ACETONIDE 40 MG/ML
40 INJECTION, SUSPENSION INTRA-ARTICULAR; INTRAMUSCULAR
Status: COMPLETED | OUTPATIENT
Start: 2025-03-19 | End: 2025-03-19

## 2025-03-19 RX ORDER — ROPIVACAINE HYDROCHLORIDE 5 MG/ML
3 INJECTION, SOLUTION EPIDURAL; INFILTRATION; PERINEURAL
Status: COMPLETED | OUTPATIENT
Start: 2025-03-19 | End: 2025-03-19

## 2025-03-19 RX ADMIN — ROPIVACAINE HYDROCHLORIDE 3 ML: 5 INJECTION, SOLUTION EPIDURAL; INFILTRATION; PERINEURAL at 11:46

## 2025-03-19 RX ADMIN — TRIAMCINOLONE ACETONIDE 40 MG: 40 INJECTION, SUSPENSION INTRA-ARTICULAR; INTRAMUSCULAR at 11:46
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
## 2025-04-09 ENCOUNTER — THERAPY VISIT (OUTPATIENT)
Dept: PHYSICAL THERAPY | Facility: CLINIC | Age: 50
End: 2025-04-09
Payer: COMMERCIAL

## 2025-04-09 DIAGNOSIS — M16.11 PRIMARY OSTEOARTHRITIS OF RIGHT HIP: Primary | ICD-10-CM

## 2025-04-09 DIAGNOSIS — M25.551 CHRONIC PAIN OF RIGHT HIP: ICD-10-CM

## 2025-04-09 DIAGNOSIS — G89.29 CHRONIC PAIN OF RIGHT HIP: ICD-10-CM

## 2025-04-09 PROCEDURE — 97140 MANUAL THERAPY 1/> REGIONS: CPT | Mod: GP | Performed by: PHYSICAL THERAPIST

## 2025-04-09 PROCEDURE — 97035 APP MDLTY 1+ULTRASOUND EA 15: CPT | Mod: GP | Performed by: PHYSICAL THERAPIST

## 2025-04-09 PROCEDURE — 97110 THERAPEUTIC EXERCISES: CPT | Mod: GP | Performed by: PHYSICAL THERAPIST

## 2025-04-09 NOTE — PROGRESS NOTES
04/09/25 0500   Appointment Info   Signing clinician's name / credentials Freya Fitch DPT   Total/Authorized Visits 6 per PT   Visits Used 4   Medical Diagnosis Primary osteoarthritis of right hip  Chronic pain of right hip   PT Tx Diagnosis R hip OA   Precautions/Limitations none- difficulty with LE in neutral alignment, painful WB   Progress Note/Certification   Onset of illness/injury or Date of Surgery 01/03/25  (MD order)   Therapy Frequency 2x/month   Predicted Duration 2 months   Progress Note Due Date 06/07/25   Progress Note Completed Date 04/09/25   GOALS   PT Goals 2   PT Goal 1   Goal Identifier Standing   Goal Description Pt to stand with equal WB   Rationale to maximize safety and independence with performance of ADLs and functional tasks   Goal Progress varies, based on sx   Target Date 06/07/25   PT Goal 2   Goal Identifier Home program management   Goal Description Pt to report tolerance for gym and home program for long term self management of hip OA.   Rationale to maximize safety and independence with performance of ADLs and functional tasks   Goal Progress added extension today to note changes in posterior hip pain   Target Date 06/07/25   Subjective Report   Subjective Report It has been two weeks and I feel like I do still need an injection in the back.  The injection in the front of the hip helped a lot.  I was able to go on vacation and do everything I wanted to do except walk on the beach.      Objective Measures   Objective Measures Objective Measure 1;Objective Measure 2;Objective Measure 3   Objective Measure 1   Objective Measure Gait   Details Continued decr push off stance phase R.  Antalgic   Objective Measure 2   Objective Measure Palpation   Details mod to max TTP R piriformis, restricted through lumbar soft tissue   PT Modalities   PT Modalities Ultrasound   Ultrasound   Ultrasound -Type (does not include 3-5 min prep/cleanup time) Continuous   Ultrasound Minutes (88469) 8    Patient Response/Progress well tolerated   Intensity 2.0 sesay/cm2   Duration (does not include the 3-5 min set up/clean up time) 8 min   Frequency 1 MHz   Location R piriformis   Positioning prone   Treatment Interventions (PT)   Interventions Therapeutic Procedure/Exercise;Therapeutic Activity;Manual Therapy;Neuromuscular Re-education   Therapeutic Procedure/Exercise   Therapeutic Procedures: strength, endurance, ROM, flexibility minutes (03668) 10   Ther Proc 1 Bike   Ther Proc 1 - Details 5 min upright   PTRx Ther Proc 1 Standing repeated hip extension   PTRx Ther Proc 1 - Details hold focus on lumbar to initiate change   PTRx Ther Proc 2 Supine Abdominal Exercise #7A (Arm Extension with Legs at 90/90)   PTRx Ther Proc 2 - Details continue   PTRx Ther Proc 3 Bridge with adduction   PTRx Ther Proc 3 - Details continue   PTRx Ther Proc 4 Sidelying SLR with leg forward   PTRx Ther Proc 4 - Details continue   PTRx Ther Proc 5 Sidelying clam   PTRx Ther Proc 5 - Details continue   PTRx Ther Proc 6 Prone figure four   PTRx Ther Proc 6 - Details to open hip, x5 min as sustained joint stretch   Skilled Intervention Exercise management   Patient Response/Progress Trial of lumbar extension to address posterior pain   Ther Proc 2 Standing extension   Ther Proc 2 - Details repeated throughout the day   Ther Proc 3 Prone press up   Ther Proc 3 - Details x10 reps   Therapeutic Procedures Ther Proc 2;Ther Proc 3   Manual Therapy   Manual Therapy: Mobilization, MFR, MLD, friction massage minutes (66442) 20   Manual Therapy Manual Therapy 2;Manual Therapy 3;Manual Therapy 4   Manual Therapy 1 STM   Manual Therapy 1 - Details R hamstring, piriformis, lumbar paraspinals   Manual Therapy 2 Gr II   Manual Therapy 2 - Details PA lumbar L5   Manual Therapy 3 Self mobilization   Manual Therapy 3 - Details VR   Skilled Intervention Education and pain management   Patient Response/Progress trial of manual for the posterior soft tissue    Education   Learner/Method Patient;Listening;Demonstration;Pictures/Video;No Barriers to Learning   Plan   Home program Added lumbar mobility to assess potential change   Plan for next session Follow up on self management, integration of stretching       PLAN  Continue therapy per current plan of care.    Beginning/End Dates of Progress Note Reporting Period:  01/07/2025 to 04/09/2025    Referring Provider:  Jose Granados

## 2025-06-04 ENCOUNTER — OFFICE VISIT (OUTPATIENT)
Dept: ORTHOPEDICS | Facility: CLINIC | Age: 50
End: 2025-06-04
Payer: COMMERCIAL

## 2025-06-04 VITALS — WEIGHT: 290 LBS | BODY MASS INDEX: 43.95 KG/M2 | HEIGHT: 68 IN

## 2025-06-04 DIAGNOSIS — R26.89 IMPAIRED GAIT AND MOBILITY: ICD-10-CM

## 2025-06-04 DIAGNOSIS — M76.01 GLUTEAL TENDINITIS OF RIGHT BUTTOCK: ICD-10-CM

## 2025-06-04 DIAGNOSIS — M16.11 PRIMARY OSTEOARTHRITIS OF RIGHT HIP: ICD-10-CM

## 2025-06-04 DIAGNOSIS — M70.61 TROCHANTERIC BURSITIS OF RIGHT HIP: ICD-10-CM

## 2025-06-04 DIAGNOSIS — M25.551 CHRONIC PAIN OF RIGHT HIP: Primary | ICD-10-CM

## 2025-06-04 DIAGNOSIS — G89.29 CHRONIC PAIN OF RIGHT HIP: Primary | ICD-10-CM

## 2025-06-04 DIAGNOSIS — E66.01 MORBID OBESITY (H): ICD-10-CM

## 2025-06-04 PROCEDURE — 20611 DRAIN/INJ JOINT/BURSA W/US: CPT | Mod: RT | Performed by: FAMILY MEDICINE

## 2025-06-04 PROCEDURE — 99213 OFFICE O/P EST LOW 20 MIN: CPT | Mod: 25 | Performed by: FAMILY MEDICINE

## 2025-06-04 RX ORDER — TRIAMCINOLONE ACETONIDE 40 MG/ML
40 INJECTION, SUSPENSION INTRA-ARTICULAR; INTRAMUSCULAR
Status: COMPLETED | OUTPATIENT
Start: 2025-06-04 | End: 2025-06-04

## 2025-06-04 RX ORDER — LIDOCAINE HYDROCHLORIDE 10 MG/ML
2 INJECTION, SOLUTION INFILTRATION; PERINEURAL
Status: COMPLETED | OUTPATIENT
Start: 2025-06-04 | End: 2025-06-04

## 2025-06-04 RX ORDER — BUPIVACAINE HYDROCHLORIDE 5 MG/ML
2 INJECTION, SOLUTION PERINEURAL
Status: COMPLETED | OUTPATIENT
Start: 2025-06-04 | End: 2025-06-04

## 2025-06-04 RX ADMIN — TRIAMCINOLONE ACETONIDE 40 MG: 40 INJECTION, SUSPENSION INTRA-ARTICULAR; INTRAMUSCULAR at 11:35

## 2025-06-04 RX ADMIN — BUPIVACAINE HYDROCHLORIDE 2 ML: 5 INJECTION, SOLUTION PERINEURAL at 11:35

## 2025-06-04 RX ADMIN — LIDOCAINE HYDROCHLORIDE 2 ML: 10 INJECTION, SOLUTION INFILTRATION; PERINEURAL at 11:35
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
## 2025-06-05 ENCOUNTER — PATIENT OUTREACH (OUTPATIENT)
Dept: CARE COORDINATION | Facility: CLINIC | Age: 50
End: 2025-06-05
Payer: COMMERCIAL

## 2025-06-07 DIAGNOSIS — E78.5 DYSLIPIDEMIA: Chronic | ICD-10-CM

## 2025-06-09 ENCOUNTER — PATIENT OUTREACH (OUTPATIENT)
Dept: CARE COORDINATION | Facility: CLINIC | Age: 50
End: 2025-06-09
Payer: COMMERCIAL

## 2025-06-09 RX ORDER — ATORVASTATIN CALCIUM 40 MG/1
40 TABLET, FILM COATED ORAL
Qty: 90 TABLET | Refills: 0 | Status: SHIPPED | OUTPATIENT
Start: 2025-06-09

## 2025-06-19 ENCOUNTER — TRANSFERRED RECORDS (OUTPATIENT)
Dept: HEALTH INFORMATION MANAGEMENT | Facility: CLINIC | Age: 50
End: 2025-06-19
Payer: COMMERCIAL

## 2025-06-23 ENCOUNTER — TRANSCRIBE ORDERS (OUTPATIENT)
Dept: OTHER | Age: 50
End: 2025-06-23

## 2025-06-23 DIAGNOSIS — Z96.641 S/P TOTAL RIGHT HIP ARTHROPLASTY: Primary | ICD-10-CM

## 2025-07-12 DIAGNOSIS — D68.51 FACTOR V LEIDEN CARRIER: ICD-10-CM

## 2025-07-12 DIAGNOSIS — I82.5Y9 CHRONIC DEEP VEIN THROMBOSIS (DVT) OF PROXIMAL VEIN OF LOWER EXTREMITY, UNSPECIFIED LATERALITY (H): ICD-10-CM

## 2025-07-12 DIAGNOSIS — D68.52 PROTHROMBIN GENE MUTATION: ICD-10-CM

## 2025-07-14 RX ORDER — RIVAROXABAN 20 MG/1
20 TABLET, FILM COATED ORAL
Qty: 90 TABLET | Refills: 1 | Status: SHIPPED | OUTPATIENT
Start: 2025-07-14

## 2025-07-21 ENCOUNTER — OFFICE VISIT (OUTPATIENT)
Dept: FAMILY MEDICINE | Facility: CLINIC | Age: 50
End: 2025-07-21
Payer: COMMERCIAL

## 2025-07-21 VITALS
SYSTOLIC BLOOD PRESSURE: 114 MMHG | BODY MASS INDEX: 42.13 KG/M2 | OXYGEN SATURATION: 98 % | HEIGHT: 68 IN | DIASTOLIC BLOOD PRESSURE: 68 MMHG | TEMPERATURE: 97.5 F | RESPIRATION RATE: 20 BRPM | WEIGHT: 278 LBS | HEART RATE: 78 BPM

## 2025-07-21 DIAGNOSIS — D68.51 FACTOR V LEIDEN CARRIER: ICD-10-CM

## 2025-07-21 DIAGNOSIS — D68.52 PROTHROMBIN GENE MUTATION: ICD-10-CM

## 2025-07-21 DIAGNOSIS — I25.2 H/O NON-ST ELEVATION MYOCARDIAL INFARCTION (NSTEMI): ICD-10-CM

## 2025-07-21 DIAGNOSIS — E66.01 MORBID OBESITY (H): ICD-10-CM

## 2025-07-21 DIAGNOSIS — E78.5 HYPERLIPIDEMIA LDL GOAL <100: ICD-10-CM

## 2025-07-21 DIAGNOSIS — I10 HYPERTENSION, UNSPECIFIED TYPE: ICD-10-CM

## 2025-07-21 DIAGNOSIS — Z86.718 H/O DEEP VENOUS THROMBOSIS: ICD-10-CM

## 2025-07-21 DIAGNOSIS — M16.11 PRIMARY OSTEOARTHRITIS OF RIGHT HIP: ICD-10-CM

## 2025-07-21 DIAGNOSIS — Z01.818 PREOP GENERAL PHYSICAL EXAM: Primary | ICD-10-CM

## 2025-07-21 LAB
ANION GAP SERPL CALCULATED.3IONS-SCNC: 10 MMOL/L (ref 7–15)
BUN SERPL-MCNC: 11.9 MG/DL (ref 6–20)
CALCIUM SERPL-MCNC: 9.5 MG/DL (ref 8.8–10.4)
CHLORIDE SERPL-SCNC: 106 MMOL/L (ref 98–107)
CREAT SERPL-MCNC: 1.05 MG/DL (ref 0.51–0.95)
EGFRCR SERPLBLD CKD-EPI 2021: 64 ML/MIN/1.73M2
ERYTHROCYTE [DISTWIDTH] IN BLOOD BY AUTOMATED COUNT: 12.2 % (ref 10–15)
GLUCOSE SERPL-MCNC: 95 MG/DL (ref 70–99)
HCO3 SERPL-SCNC: 25 MMOL/L (ref 22–29)
HCT VFR BLD AUTO: 45.2 % (ref 35–47)
HGB BLD-MCNC: 14.6 G/DL (ref 11.7–15.7)
MCH RBC QN AUTO: 28.5 PG (ref 26.5–33)
MCHC RBC AUTO-ENTMCNC: 32.3 G/DL (ref 31.5–36.5)
MCV RBC AUTO: 88 FL (ref 78–100)
PLATELET # BLD AUTO: 177 10E3/UL (ref 150–450)
POTASSIUM SERPL-SCNC: 4.6 MMOL/L (ref 3.4–5.3)
RBC # BLD AUTO: 5.12 10E6/UL (ref 3.8–5.2)
SODIUM SERPL-SCNC: 141 MMOL/L (ref 135–145)
WBC # BLD AUTO: 8.6 10E3/UL (ref 4–11)

## 2025-07-21 PROCEDURE — 80048 BASIC METABOLIC PNL TOTAL CA: CPT | Performed by: FAMILY MEDICINE

## 2025-07-21 PROCEDURE — 3078F DIAST BP <80 MM HG: CPT | Performed by: FAMILY MEDICINE

## 2025-07-21 PROCEDURE — 36415 COLL VENOUS BLD VENIPUNCTURE: CPT | Performed by: FAMILY MEDICINE

## 2025-07-21 PROCEDURE — 99214 OFFICE O/P EST MOD 30 MIN: CPT | Performed by: FAMILY MEDICINE

## 2025-07-21 PROCEDURE — 3074F SYST BP LT 130 MM HG: CPT | Performed by: FAMILY MEDICINE

## 2025-07-21 PROCEDURE — 1125F AMNT PAIN NOTED PAIN PRSNT: CPT | Performed by: FAMILY MEDICINE

## 2025-07-21 PROCEDURE — 85027 COMPLETE CBC AUTOMATED: CPT | Performed by: FAMILY MEDICINE

## 2025-07-21 PROCEDURE — 93000 ELECTROCARDIOGRAM COMPLETE: CPT | Performed by: FAMILY MEDICINE

## 2025-07-21 ASSESSMENT — PAIN SCALES - GENERAL: PAINLEVEL_OUTOF10: MODERATE PAIN (4)
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
## 2025-07-24 ENCOUNTER — LAB (OUTPATIENT)
Dept: LAB | Facility: CLINIC | Age: 50
End: 2025-07-24
Payer: COMMERCIAL

## 2025-07-24 ENCOUNTER — E-VISIT (OUTPATIENT)
Dept: FAMILY MEDICINE | Facility: CLINIC | Age: 50
End: 2025-07-24
Payer: COMMERCIAL

## 2025-07-24 DIAGNOSIS — R30.0 DYSURIA: ICD-10-CM

## 2025-07-24 DIAGNOSIS — R30.0 DYSURIA: Primary | ICD-10-CM

## 2025-07-24 LAB
ALBUMIN UR-MCNC: NEGATIVE MG/DL
APPEARANCE UR: CLEAR
BACTERIA #/AREA URNS HPF: ABNORMAL /HPF
BILIRUB UR QL STRIP: NEGATIVE
COLOR UR AUTO: YELLOW
GLUCOSE UR STRIP-MCNC: NEGATIVE MG/DL
HGB UR QL STRIP: ABNORMAL
KETONES UR STRIP-MCNC: NEGATIVE MG/DL
LEUKOCYTE ESTERASE UR QL STRIP: ABNORMAL
NITRATE UR QL: NEGATIVE
PH UR STRIP: 7 [PH] (ref 5–7)
RBC #/AREA URNS AUTO: ABNORMAL /HPF
SP GR UR STRIP: 1.01 (ref 1–1.03)
SQUAMOUS #/AREA URNS AUTO: ABNORMAL /LPF
UROBILINOGEN UR STRIP-ACNC: 1 E.U./DL
WBC #/AREA URNS AUTO: ABNORMAL /HPF
WBC CLUMPS #/AREA URNS HPF: PRESENT /HPF

## 2025-07-24 RX ORDER — NITROFURANTOIN 25; 75 MG/1; MG/1
100 CAPSULE ORAL 2 TIMES DAILY
Qty: 10 CAPSULE | Refills: 0 | Status: SHIPPED | OUTPATIENT
Start: 2025-07-24 | End: 2025-07-29

## 2025-08-13 ENCOUNTER — ANESTHESIA EVENT (OUTPATIENT)
Dept: SURGERY | Facility: CLINIC | Age: 50
End: 2025-08-13
Payer: COMMERCIAL

## 2025-08-14 ENCOUNTER — ANESTHESIA (OUTPATIENT)
Dept: SURGERY | Facility: CLINIC | Age: 50
End: 2025-08-14
Payer: COMMERCIAL

## 2025-08-14 ENCOUNTER — HOSPITAL ENCOUNTER (OUTPATIENT)
Facility: CLINIC | Age: 50
End: 2025-08-14
Attending: ORTHOPAEDIC SURGERY | Admitting: ORTHOPAEDIC SURGERY
Payer: COMMERCIAL

## 2025-08-14 ENCOUNTER — APPOINTMENT (OUTPATIENT)
Dept: GENERAL RADIOLOGY | Facility: CLINIC | Age: 50
End: 2025-08-14
Attending: ORTHOPAEDIC SURGERY
Payer: COMMERCIAL

## 2025-08-14 VITALS
HEART RATE: 74 BPM | TEMPERATURE: 98.1 F | DIASTOLIC BLOOD PRESSURE: 62 MMHG | SYSTOLIC BLOOD PRESSURE: 126 MMHG | WEIGHT: 278 LBS | HEIGHT: 68 IN | RESPIRATION RATE: 17 BRPM | BODY MASS INDEX: 42.13 KG/M2 | OXYGEN SATURATION: 96 %

## 2025-08-14 DIAGNOSIS — Z96.641 STATUS POST RIGHT HIP REPLACEMENT: Primary | ICD-10-CM

## 2025-08-14 PROBLEM — D68.51 FACTOR V LEIDEN CARRIER: Status: ACTIVE | Noted: 2018-10-31

## 2025-08-14 PROBLEM — Z86.718 HISTORY OF DVT (DEEP VEIN THROMBOSIS): Status: ACTIVE | Noted: 2025-08-14

## 2025-08-14 PROBLEM — I21.4 NSTEMI (NON-ST ELEVATED MYOCARDIAL INFARCTION) (H): Status: ACTIVE | Noted: 2023-09-24

## 2025-08-14 LAB
GLUCOSE BLDC GLUCOMTR-MCNC: 71 MG/DL (ref 70–99)
GLUCOSE BLDC GLUCOMTR-MCNC: 99 MG/DL (ref 70–99)

## 2025-08-14 PROCEDURE — 250N000011 HC RX IP 250 OP 636: Performed by: ORTHOPAEDIC SURGERY

## 2025-08-14 PROCEDURE — 250N000009 HC RX 250: Performed by: ORTHOPAEDIC SURGERY

## 2025-08-14 PROCEDURE — 250N000013 HC RX MED GY IP 250 OP 250 PS 637

## 2025-08-14 PROCEDURE — C1776 JOINT DEVICE (IMPLANTABLE): HCPCS | Performed by: ORTHOPAEDIC SURGERY

## 2025-08-14 PROCEDURE — 258N000003 HC RX IP 258 OP 636: Performed by: NURSE ANESTHETIST, CERTIFIED REGISTERED

## 2025-08-14 PROCEDURE — 710N000009 HC RECOVERY PHASE 1, LEVEL 1, PER MIN: Performed by: ORTHOPAEDIC SURGERY

## 2025-08-14 PROCEDURE — 250N000009 HC RX 250

## 2025-08-14 PROCEDURE — 999N000141 HC STATISTIC PRE-PROCEDURE NURSING ASSESSMENT: Performed by: ORTHOPAEDIC SURGERY

## 2025-08-14 PROCEDURE — 272N000002 HC OR SUPPLY OTHER OPNP: Performed by: ORTHOPAEDIC SURGERY

## 2025-08-14 PROCEDURE — C1713 ANCHOR/SCREW BN/BN,TIS/BN: HCPCS | Performed by: ORTHOPAEDIC SURGERY

## 2025-08-14 PROCEDURE — 64447 NJX AA&/STRD FEMORAL NRV IMG: CPT | Mod: XU,LT

## 2025-08-14 PROCEDURE — 370N000017 HC ANESTHESIA TECHNICAL FEE, PER MIN: Performed by: ORTHOPAEDIC SURGERY

## 2025-08-14 PROCEDURE — 258N000003 HC RX IP 258 OP 636

## 2025-08-14 PROCEDURE — 250N000011 HC RX IP 250 OP 636: Performed by: NURSE ANESTHETIST, CERTIFIED REGISTERED

## 2025-08-14 PROCEDURE — 360N000077 HC SURGERY LEVEL 4, PER MIN: Performed by: ORTHOPAEDIC SURGERY

## 2025-08-14 PROCEDURE — 250N000009 HC RX 250: Performed by: NURSE ANESTHETIST, CERTIFIED REGISTERED

## 2025-08-14 PROCEDURE — 272N000001 HC OR GENERAL SUPPLY STERILE: Performed by: ORTHOPAEDIC SURGERY

## 2025-08-14 PROCEDURE — 250N000013 HC RX MED GY IP 250 OP 250 PS 637: Performed by: ORTHOPAEDIC SURGERY

## 2025-08-14 PROCEDURE — 82962 GLUCOSE BLOOD TEST: CPT

## 2025-08-14 PROCEDURE — 999N000065 XR PELVIS PORT 1/2 VIEWS

## 2025-08-14 DEVICE — IMPLANTABLE DEVICE: Type: IMPLANTABLE DEVICE | Site: HIP | Status: FUNCTIONAL

## 2025-08-14 DEVICE — IMP SCR BONE CAN ACE 6.5X35MM 1217-35-500: Type: IMPLANTABLE DEVICE | Site: HIP | Status: FUNCTIONAL

## 2025-08-14 DEVICE — IMP STEM FEM DEPUY ACTIS STD COLLAR TPR SZ 7MM 1010-11-070: Type: IMPLANTABLE DEVICE | Site: HIP | Status: FUNCTIONAL

## 2025-08-14 RX ORDER — ONDANSETRON 2 MG/ML
4 INJECTION INTRAMUSCULAR; INTRAVENOUS EVERY 6 HOURS PRN
Status: DISCONTINUED | OUTPATIENT
Start: 2025-08-14 | End: 2025-08-15 | Stop reason: HOSPADM

## 2025-08-14 RX ORDER — KETOROLAC TROMETHAMINE 15 MG/ML
15 INJECTION, SOLUTION INTRAMUSCULAR; INTRAVENOUS EVERY 6 HOURS
Status: COMPLETED | OUTPATIENT
Start: 2025-08-14 | End: 2025-08-15

## 2025-08-14 RX ORDER — FENTANYL CITRATE 0.05 MG/ML
INJECTION, SOLUTION INTRAMUSCULAR; INTRAVENOUS PRN
Status: DISCONTINUED | OUTPATIENT
Start: 2025-08-14 | End: 2025-08-14

## 2025-08-14 RX ORDER — DEXAMETHASONE SODIUM PHOSPHATE 4 MG/ML
4 INJECTION, SOLUTION INTRA-ARTICULAR; INTRALESIONAL; INTRAMUSCULAR; INTRAVENOUS; SOFT TISSUE
Status: DISCONTINUED | OUTPATIENT
Start: 2025-08-14 | End: 2025-08-14 | Stop reason: HOSPADM

## 2025-08-14 RX ORDER — BISACODYL 10 MG
10 SUPPOSITORY, RECTAL RECTAL DAILY PRN
Status: DISCONTINUED | OUTPATIENT
Start: 2025-08-14 | End: 2025-08-15 | Stop reason: HOSPADM

## 2025-08-14 RX ORDER — LIDOCAINE 40 MG/G
CREAM TOPICAL
Status: DISCONTINUED | OUTPATIENT
Start: 2025-08-14 | End: 2025-08-14 | Stop reason: HOSPADM

## 2025-08-14 RX ORDER — LIDOCAINE 40 MG/G
CREAM TOPICAL
Status: DISCONTINUED | OUTPATIENT
Start: 2025-08-14 | End: 2025-08-15 | Stop reason: HOSPADM

## 2025-08-14 RX ORDER — HYDROMORPHONE HCL IN WATER/PF 6 MG/30 ML
0.4 PATIENT CONTROLLED ANALGESIA SYRINGE INTRAVENOUS EVERY 5 MIN PRN
Status: DISCONTINUED | OUTPATIENT
Start: 2025-08-14 | End: 2025-08-14 | Stop reason: HOSPADM

## 2025-08-14 RX ORDER — OXYCODONE HYDROCHLORIDE 5 MG/1
10 TABLET ORAL
Status: DISCONTINUED | OUTPATIENT
Start: 2025-08-14 | End: 2025-08-14 | Stop reason: HOSPADM

## 2025-08-14 RX ORDER — NALOXONE HYDROCHLORIDE 0.4 MG/ML
0.4 INJECTION, SOLUTION INTRAMUSCULAR; INTRAVENOUS; SUBCUTANEOUS
Status: DISCONTINUED | OUTPATIENT
Start: 2025-08-14 | End: 2025-08-15 | Stop reason: HOSPADM

## 2025-08-14 RX ORDER — AMOXICILLIN 250 MG
1-2 CAPSULE ORAL 2 TIMES DAILY PRN
Qty: 30 TABLET | Refills: 0 | Status: SHIPPED | OUTPATIENT
Start: 2025-08-14

## 2025-08-14 RX ORDER — NALOXONE HYDROCHLORIDE 0.4 MG/ML
0.2 INJECTION, SOLUTION INTRAMUSCULAR; INTRAVENOUS; SUBCUTANEOUS
Status: DISCONTINUED | OUTPATIENT
Start: 2025-08-14 | End: 2025-08-15 | Stop reason: HOSPADM

## 2025-08-14 RX ORDER — METOPROLOL SUCCINATE 50 MG/1
50 TABLET, EXTENDED RELEASE ORAL DAILY
Status: DISCONTINUED | OUTPATIENT
Start: 2025-08-14 | End: 2025-08-15 | Stop reason: HOSPADM

## 2025-08-14 RX ORDER — PROPOFOL 10 MG/ML
INJECTION, EMULSION INTRAVENOUS CONTINUOUS PRN
Status: DISCONTINUED | OUTPATIENT
Start: 2025-08-14 | End: 2025-08-14

## 2025-08-14 RX ORDER — CEFAZOLIN SODIUM/WATER 3 G/30 ML
3 SYRINGE (ML) INTRAVENOUS
Status: COMPLETED | OUTPATIENT
Start: 2025-08-14 | End: 2025-08-14

## 2025-08-14 RX ORDER — GABAPENTIN 300 MG/1
300 CAPSULE ORAL
Status: COMPLETED | OUTPATIENT
Start: 2025-08-14 | End: 2025-08-14

## 2025-08-14 RX ORDER — TRANEXAMIC ACID 650 MG/1
1950 TABLET ORAL ONCE
Status: COMPLETED | OUTPATIENT
Start: 2025-08-14 | End: 2025-08-14

## 2025-08-14 RX ORDER — DEXAMETHASONE SODIUM PHOSPHATE 4 MG/ML
INJECTION, SOLUTION INTRA-ARTICULAR; INTRALESIONAL; INTRAMUSCULAR; INTRAVENOUS; SOFT TISSUE PRN
Status: DISCONTINUED | OUTPATIENT
Start: 2025-08-14 | End: 2025-08-14

## 2025-08-14 RX ORDER — BUPIVACAINE HYDROCHLORIDE 5 MG/ML
INJECTION, SOLUTION PERINEURAL PRN
Status: DISCONTINUED | OUTPATIENT
Start: 2025-08-14 | End: 2025-08-14 | Stop reason: HOSPADM

## 2025-08-14 RX ORDER — SODIUM CHLORIDE, SODIUM LACTATE, POTASSIUM CHLORIDE, CALCIUM CHLORIDE 600; 310; 30; 20 MG/100ML; MG/100ML; MG/100ML; MG/100ML
INJECTION, SOLUTION INTRAVENOUS CONTINUOUS
Status: DISCONTINUED | OUTPATIENT
Start: 2025-08-14 | End: 2025-08-14 | Stop reason: HOSPADM

## 2025-08-14 RX ORDER — AMOXICILLIN 250 MG
1 CAPSULE ORAL 2 TIMES DAILY
Status: DISCONTINUED | OUTPATIENT
Start: 2025-08-14 | End: 2025-08-15 | Stop reason: HOSPADM

## 2025-08-14 RX ORDER — ONDANSETRON 4 MG/1
4 TABLET, ORALLY DISINTEGRATING ORAL EVERY 6 HOURS PRN
Status: DISCONTINUED | OUTPATIENT
Start: 2025-08-14 | End: 2025-08-15 | Stop reason: HOSPADM

## 2025-08-14 RX ORDER — CEFAZOLIN SODIUM 2 G/50ML
2 SOLUTION INTRAVENOUS EVERY 8 HOURS
Status: DISCONTINUED | OUTPATIENT
Start: 2025-08-14 | End: 2025-08-14

## 2025-08-14 RX ORDER — OXYCODONE HYDROCHLORIDE 5 MG/1
5 TABLET ORAL EVERY 4 HOURS PRN
Status: DISCONTINUED | OUTPATIENT
Start: 2025-08-14 | End: 2025-08-15 | Stop reason: HOSPADM

## 2025-08-14 RX ORDER — SODIUM CHLORIDE, SODIUM LACTATE, POTASSIUM CHLORIDE, CALCIUM CHLORIDE 600; 310; 30; 20 MG/100ML; MG/100ML; MG/100ML; MG/100ML
INJECTION, SOLUTION INTRAVENOUS CONTINUOUS
Status: DISCONTINUED | OUTPATIENT
Start: 2025-08-14 | End: 2025-08-15 | Stop reason: HOSPADM

## 2025-08-14 RX ORDER — CALCIUM CARBONATE 500 MG/1
500 TABLET, CHEWABLE ORAL 4 TIMES DAILY PRN
Status: DISCONTINUED | OUTPATIENT
Start: 2025-08-14 | End: 2025-08-15 | Stop reason: HOSPADM

## 2025-08-14 RX ORDER — HYDROXYZINE HYDROCHLORIDE 25 MG/1
25 TABLET, FILM COATED ORAL EVERY 6 HOURS PRN
Status: DISCONTINUED | OUTPATIENT
Start: 2025-08-14 | End: 2025-08-14 | Stop reason: HOSPADM

## 2025-08-14 RX ORDER — CEFAZOLIN SODIUM/WATER 3 G/30 ML
3 SYRINGE (ML) INTRAVENOUS SEE ADMIN INSTRUCTIONS
Status: DISCONTINUED | OUTPATIENT
Start: 2025-08-14 | End: 2025-08-14 | Stop reason: HOSPADM

## 2025-08-14 RX ORDER — HYDROXYZINE HYDROCHLORIDE 25 MG/1
25 TABLET, FILM COATED ORAL EVERY 6 HOURS PRN
Status: DISCONTINUED | OUTPATIENT
Start: 2025-08-14 | End: 2025-08-15 | Stop reason: HOSPADM

## 2025-08-14 RX ORDER — ONDANSETRON 4 MG/1
4 TABLET, ORALLY DISINTEGRATING ORAL EVERY 30 MIN PRN
Status: DISCONTINUED | OUTPATIENT
Start: 2025-08-14 | End: 2025-08-14 | Stop reason: HOSPADM

## 2025-08-14 RX ORDER — OXYCODONE HYDROCHLORIDE 5 MG/1
10 TABLET ORAL EVERY 4 HOURS PRN
Status: DISCONTINUED | OUTPATIENT
Start: 2025-08-14 | End: 2025-08-15 | Stop reason: HOSPADM

## 2025-08-14 RX ORDER — ACETAMINOPHEN 325 MG/1
975 TABLET ORAL ONCE
Status: COMPLETED | OUTPATIENT
Start: 2025-08-14 | End: 2025-08-14

## 2025-08-14 RX ORDER — HYDROXYZINE HYDROCHLORIDE 25 MG/1
25 TABLET, FILM COATED ORAL EVERY 6 HOURS PRN
Qty: 30 TABLET | Refills: 0 | Status: SHIPPED | OUTPATIENT
Start: 2025-08-14

## 2025-08-14 RX ORDER — PROCHLORPERAZINE MALEATE 5 MG/1
10 TABLET ORAL EVERY 6 HOURS PRN
Status: DISCONTINUED | OUTPATIENT
Start: 2025-08-14 | End: 2025-08-15 | Stop reason: HOSPADM

## 2025-08-14 RX ORDER — FAMOTIDINE 20 MG/1
20 TABLET, FILM COATED ORAL 2 TIMES DAILY
Status: DISCONTINUED | OUTPATIENT
Start: 2025-08-14 | End: 2025-08-15 | Stop reason: HOSPADM

## 2025-08-14 RX ORDER — ACETAMINOPHEN 325 MG/1
975 TABLET ORAL EVERY 8 HOURS
Status: DISCONTINUED | OUTPATIENT
Start: 2025-08-14 | End: 2025-08-15 | Stop reason: HOSPADM

## 2025-08-14 RX ORDER — GLYCOPYRROLATE 0.2 MG/ML
INJECTION, SOLUTION INTRAMUSCULAR; INTRAVENOUS PRN
Status: DISCONTINUED | OUTPATIENT
Start: 2025-08-14 | End: 2025-08-14

## 2025-08-14 RX ORDER — NALOXONE HYDROCHLORIDE 0.4 MG/ML
0.1 INJECTION, SOLUTION INTRAMUSCULAR; INTRAVENOUS; SUBCUTANEOUS
Status: DISCONTINUED | OUTPATIENT
Start: 2025-08-14 | End: 2025-08-14 | Stop reason: HOSPADM

## 2025-08-14 RX ORDER — CEFAZOLIN SODIUM 2 G/50ML
2 SOLUTION INTRAVENOUS EVERY 8 HOURS
Status: COMPLETED | OUTPATIENT
Start: 2025-08-14 | End: 2025-08-15

## 2025-08-14 RX ORDER — ATORVASTATIN CALCIUM 20 MG/1
40 TABLET, FILM COATED ORAL DAILY
Status: DISCONTINUED | OUTPATIENT
Start: 2025-08-15 | End: 2025-08-15 | Stop reason: HOSPADM

## 2025-08-14 RX ORDER — ONDANSETRON 2 MG/ML
INJECTION INTRAMUSCULAR; INTRAVENOUS PRN
Status: DISCONTINUED | OUTPATIENT
Start: 2025-08-14 | End: 2025-08-14

## 2025-08-14 RX ORDER — ONDANSETRON 2 MG/ML
4 INJECTION INTRAMUSCULAR; INTRAVENOUS EVERY 30 MIN PRN
Status: DISCONTINUED | OUTPATIENT
Start: 2025-08-14 | End: 2025-08-14 | Stop reason: HOSPADM

## 2025-08-14 RX ORDER — BUPIVACAINE HYDROCHLORIDE 7.5 MG/ML
INJECTION, SOLUTION INTRASPINAL
Status: COMPLETED | OUTPATIENT
Start: 2025-08-14 | End: 2025-08-14

## 2025-08-14 RX ORDER — METOPROLOL TARTRATE 1 MG/ML
1-2 INJECTION, SOLUTION INTRAVENOUS EVERY 5 MIN PRN
Status: DISCONTINUED | OUTPATIENT
Start: 2025-08-14 | End: 2025-08-14 | Stop reason: HOSPADM

## 2025-08-14 RX ORDER — ACETAMINOPHEN 325 MG/1
650 TABLET ORAL EVERY 4 HOURS PRN
Status: SHIPPED
Start: 2025-08-14 | End: 2025-08-15

## 2025-08-14 RX ORDER — POLYETHYLENE GLYCOL 3350 17 G/17G
17 POWDER, FOR SOLUTION ORAL DAILY
Status: DISCONTINUED | OUTPATIENT
Start: 2025-08-15 | End: 2025-08-15 | Stop reason: HOSPADM

## 2025-08-14 RX ORDER — OXYCODONE HYDROCHLORIDE 5 MG/1
5 TABLET ORAL
Status: DISCONTINUED | OUTPATIENT
Start: 2025-08-14 | End: 2025-08-14 | Stop reason: HOSPADM

## 2025-08-14 RX ORDER — OXYCODONE HYDROCHLORIDE 5 MG/1
5-10 TABLET ORAL EVERY 4 HOURS PRN
Qty: 26 TABLET | Refills: 0 | Status: SHIPPED | OUTPATIENT
Start: 2025-08-14

## 2025-08-14 RX ORDER — HYDROMORPHONE HCL IN WATER/PF 6 MG/30 ML
0.2 PATIENT CONTROLLED ANALGESIA SYRINGE INTRAVENOUS
Status: DISCONTINUED | OUTPATIENT
Start: 2025-08-14 | End: 2025-08-15 | Stop reason: HOSPADM

## 2025-08-14 RX ORDER — FENTANYL CITRATE 50 UG/ML
50 INJECTION, SOLUTION INTRAMUSCULAR; INTRAVENOUS EVERY 5 MIN PRN
Status: DISCONTINUED | OUTPATIENT
Start: 2025-08-14 | End: 2025-08-14 | Stop reason: HOSPADM

## 2025-08-14 RX ORDER — HYDROMORPHONE HCL IN WATER/PF 6 MG/30 ML
0.4 PATIENT CONTROLLED ANALGESIA SYRINGE INTRAVENOUS
Status: DISCONTINUED | OUTPATIENT
Start: 2025-08-14 | End: 2025-08-15 | Stop reason: HOSPADM

## 2025-08-14 RX ADMIN — LIDOCAINE HYDROCHLORIDE 0.2 ML: 10 INJECTION, SOLUTION EPIDURAL; INFILTRATION; INTRACAUDAL; PERINEURAL at 12:20

## 2025-08-14 RX ADMIN — MIDAZOLAM HYDROCHLORIDE 1 MG: 1 INJECTION, SOLUTION INTRAMUSCULAR; INTRAVENOUS at 14:36

## 2025-08-14 RX ADMIN — PHENYLEPHRINE HYDROCHLORIDE 200 MCG: 10 INJECTION INTRAVENOUS at 14:48

## 2025-08-14 RX ADMIN — SODIUM CHLORIDE, POTASSIUM CHLORIDE, SODIUM LACTATE AND CALCIUM CHLORIDE: 600; 310; 30; 20 INJECTION, SOLUTION INTRAVENOUS at 15:17

## 2025-08-14 RX ADMIN — OXYCODONE HYDROCHLORIDE 5 MG: 5 TABLET ORAL at 18:28

## 2025-08-14 RX ADMIN — PHENYLEPHRINE HYDROCHLORIDE 200 MCG: 10 INJECTION INTRAVENOUS at 15:22

## 2025-08-14 RX ADMIN — DEXAMETHASONE SODIUM PHOSPHATE 4 MG: 4 INJECTION, SOLUTION INTRA-ARTICULAR; INTRALESIONAL; INTRAMUSCULAR; INTRAVENOUS; SOFT TISSUE at 14:25

## 2025-08-14 RX ADMIN — MIDAZOLAM HYDROCHLORIDE 2 MG: 1 INJECTION, SOLUTION INTRAMUSCULAR; INTRAVENOUS at 14:30

## 2025-08-14 RX ADMIN — ACETAMINOPHEN 975 MG: 325 TABLET ORAL at 20:41

## 2025-08-14 RX ADMIN — MIDAZOLAM HYDROCHLORIDE 1 MG: 1 INJECTION, SOLUTION INTRAMUSCULAR; INTRAVENOUS at 14:06

## 2025-08-14 RX ADMIN — FENTANYL CITRATE 50 MCG: 0.05 INJECTION, SOLUTION INTRAMUSCULAR; INTRAVENOUS at 14:04

## 2025-08-14 RX ADMIN — PHENYLEPHRINE HYDROCHLORIDE 200 MCG: 10 INJECTION INTRAVENOUS at 14:42

## 2025-08-14 RX ADMIN — SENNOSIDES, DOCUSATE SODIUM 1 TABLET: 50; 8.6 TABLET, FILM COATED ORAL at 20:42

## 2025-08-14 RX ADMIN — MIDAZOLAM HYDROCHLORIDE 2 MG: 1 INJECTION, SOLUTION INTRAMUSCULAR; INTRAVENOUS at 13:57

## 2025-08-14 RX ADMIN — GABAPENTIN 300 MG: 300 CAPSULE ORAL at 12:10

## 2025-08-14 RX ADMIN — MIDAZOLAM HYDROCHLORIDE 2 MG: 1 INJECTION, SOLUTION INTRAMUSCULAR; INTRAVENOUS at 14:25

## 2025-08-14 RX ADMIN — KETOROLAC TROMETHAMINE 15 MG: 15 INJECTION, SOLUTION INTRAMUSCULAR; INTRAVENOUS at 17:40

## 2025-08-14 RX ADMIN — BUPIVACAINE HYDROCHLORIDE IN DEXTROSE 1.7 ML: 7.5 INJECTION, SOLUTION SUBARACHNOID at 14:09

## 2025-08-14 RX ADMIN — ONDANSETRON 4 MG: 2 INJECTION INTRAMUSCULAR; INTRAVENOUS at 14:25

## 2025-08-14 RX ADMIN — TRANEXAMIC ACID 1 G: 1 INJECTION, SOLUTION INTRAVENOUS at 14:58

## 2025-08-14 RX ADMIN — CEFAZOLIN SODIUM 2 G: 2 SOLUTION INTRAVENOUS at 22:29

## 2025-08-14 RX ADMIN — PROPOFOL 25 MCG/KG/MIN: 10 INJECTION, EMULSION INTRAVENOUS at 14:11

## 2025-08-14 RX ADMIN — TRANEXAMIC ACID 1950 MG: 650 TABLET ORAL at 12:10

## 2025-08-14 RX ADMIN — ACETAMINOPHEN 975 MG: 325 TABLET ORAL at 12:10

## 2025-08-14 RX ADMIN — FAMOTIDINE 20 MG: 20 TABLET, FILM COATED ORAL at 20:42

## 2025-08-14 RX ADMIN — METOPROLOL SUCCINATE 50 MG: 50 TABLET, EXTENDED RELEASE ORAL at 20:42

## 2025-08-14 RX ADMIN — PHENYLEPHRINE HYDROCHLORIDE 200 MCG: 10 INJECTION INTRAVENOUS at 14:52

## 2025-08-14 RX ADMIN — Medication 3 G: at 13:56

## 2025-08-14 RX ADMIN — SODIUM CHLORIDE, POTASSIUM CHLORIDE, SODIUM LACTATE AND CALCIUM CHLORIDE: 600; 310; 30; 20 INJECTION, SOLUTION INTRAVENOUS at 12:19

## 2025-08-14 RX ADMIN — GLYCOPYRROLATE 0.3 MG: 0.2 INJECTION, SOLUTION INTRAMUSCULAR; INTRAVENOUS at 14:04

## 2025-08-14 RX ADMIN — OXYCODONE HYDROCHLORIDE 5 MG: 5 TABLET ORAL at 22:32

## 2025-08-14 RX ADMIN — MIDAZOLAM HYDROCHLORIDE 2 MG: 1 INJECTION, SOLUTION INTRAMUSCULAR; INTRAVENOUS at 14:02

## 2025-08-14 RX ADMIN — FENTANYL CITRATE 50 MCG: 0.05 INJECTION, SOLUTION INTRAMUSCULAR; INTRAVENOUS at 13:58

## 2025-08-14 ASSESSMENT — ACTIVITIES OF DAILY LIVING (ADL)
ADLS_ACUITY_SCORE: 47
ADLS_ACUITY_SCORE: 37
ADLS_ACUITY_SCORE: 38
TOILETING_ISSUES: NO
ADLS_ACUITY_SCORE: 47
WEAR_GLASSES_OR_BLIND: NO
WALKING_OR_CLIMBING_STAIRS_DIFFICULTY: YES
ADLS_ACUITY_SCORE: 38
ADLS_ACUITY_SCORE: 47
FALL_HISTORY_WITHIN_LAST_SIX_MONTHS: NO
DRESSING/BATHING_DIFFICULTY: NO
ADLS_ACUITY_SCORE: 47
CHANGE_IN_FUNCTIONAL_STATUS_SINCE_ONSET_OF_CURRENT_ILLNESS/INJURY: NO
ADLS_ACUITY_SCORE: 47
ADLS_ACUITY_SCORE: 47
DOING_ERRANDS_INDEPENDENTLY_DIFFICULTY: YES
WALKING_OR_CLIMBING_STAIRS: OTHER (SEE COMMENTS)
ADLS_ACUITY_SCORE: 38
ADLS_ACUITY_SCORE: 47
EQUIPMENT_CURRENTLY_USED_AT_HOME: CANE, STRAIGHT
DIFFICULTY_EATING/SWALLOWING: NO
CONCENTRATING,_REMEMBERING_OR_MAKING_DECISIONS_DIFFICULTY: NO
DIFFICULTY_COMMUNICATING: NO
ADLS_ACUITY_SCORE: 38
HEARING_DIFFICULTY_OR_DEAF: NO

## 2025-08-15 ENCOUNTER — APPOINTMENT (OUTPATIENT)
Dept: PHYSICAL THERAPY | Facility: CLINIC | Age: 50
End: 2025-08-15
Attending: ORTHOPAEDIC SURGERY
Payer: COMMERCIAL

## 2025-08-15 ENCOUNTER — APPOINTMENT (OUTPATIENT)
Dept: OCCUPATIONAL THERAPY | Facility: CLINIC | Age: 50
End: 2025-08-15
Attending: ORTHOPAEDIC SURGERY
Payer: COMMERCIAL

## 2025-08-15 VITALS
SYSTOLIC BLOOD PRESSURE: 108 MMHG | BODY MASS INDEX: 42.13 KG/M2 | HEART RATE: 66 BPM | WEIGHT: 278 LBS | RESPIRATION RATE: 18 BRPM | TEMPERATURE: 97.5 F | OXYGEN SATURATION: 99 % | HEIGHT: 68 IN | DIASTOLIC BLOOD PRESSURE: 65 MMHG

## 2025-08-15 LAB
HGB BLD-MCNC: 11.9 G/DL (ref 11.7–15.7)
HOLD SPECIMEN: NORMAL
MCV RBC AUTO: 88.5 FL (ref 78–100)

## 2025-08-15 PROCEDURE — 99232 SBSQ HOSP IP/OBS MODERATE 35: CPT

## 2025-08-15 PROCEDURE — 97165 OT EVAL LOW COMPLEX 30 MIN: CPT | Mod: GO

## 2025-08-15 PROCEDURE — 250N000011 HC RX IP 250 OP 636: Performed by: ORTHOPAEDIC SURGERY

## 2025-08-15 PROCEDURE — 250N000013 HC RX MED GY IP 250 OP 250 PS 637: Performed by: ORTHOPAEDIC SURGERY

## 2025-08-15 PROCEDURE — 36415 COLL VENOUS BLD VENIPUNCTURE: CPT | Performed by: ORTHOPAEDIC SURGERY

## 2025-08-15 PROCEDURE — 85018 HEMOGLOBIN: CPT | Performed by: ORTHOPAEDIC SURGERY

## 2025-08-15 RX ADMIN — POLYETHYLENE GLYCOL 3350 17 G: 17 POWDER, FOR SOLUTION ORAL at 09:46

## 2025-08-15 RX ADMIN — ACETAMINOPHEN 975 MG: 325 TABLET ORAL at 03:27

## 2025-08-15 RX ADMIN — KETOROLAC TROMETHAMINE 15 MG: 15 INJECTION, SOLUTION INTRAMUSCULAR; INTRAVENOUS at 11:00

## 2025-08-15 RX ADMIN — OXYCODONE HYDROCHLORIDE 10 MG: 5 TABLET ORAL at 07:00

## 2025-08-15 RX ADMIN — KETOROLAC TROMETHAMINE 15 MG: 15 INJECTION, SOLUTION INTRAMUSCULAR; INTRAVENOUS at 07:00

## 2025-08-15 RX ADMIN — KETOROLAC TROMETHAMINE 15 MG: 15 INJECTION, SOLUTION INTRAMUSCULAR; INTRAVENOUS at 00:15

## 2025-08-15 RX ADMIN — CEFAZOLIN SODIUM 2 G: 2 SOLUTION INTRAVENOUS at 06:59

## 2025-08-15 RX ADMIN — OXYCODONE HYDROCHLORIDE 10 MG: 5 TABLET ORAL at 11:09

## 2025-08-15 RX ADMIN — HYDROXYZINE HYDROCHLORIDE 25 MG: 25 TABLET, FILM COATED ORAL at 03:28

## 2025-08-15 RX ADMIN — FAMOTIDINE 20 MG: 20 TABLET, FILM COATED ORAL at 09:48

## 2025-08-15 RX ADMIN — SENNOSIDES, DOCUSATE SODIUM 1 TABLET: 50; 8.6 TABLET, FILM COATED ORAL at 09:47

## 2025-08-15 RX ADMIN — ACETAMINOPHEN 975 MG: 325 TABLET ORAL at 11:00

## 2025-08-15 RX ADMIN — OXYCODONE HYDROCHLORIDE 5 MG: 5 TABLET ORAL at 00:16

## 2025-08-15 ASSESSMENT — ACTIVITIES OF DAILY LIVING (ADL)
ADLS_ACUITY_SCORE: 38
ADLS_ACUITY_SCORE: 39
ADLS_ACUITY_SCORE: 37
ADLS_ACUITY_SCORE: 38
ADLS_ACUITY_SCORE: 39
ADLS_ACUITY_SCORE: 37
ADLS_ACUITY_SCORE: 39

## 2025-08-18 ENCOUNTER — THERAPY VISIT (OUTPATIENT)
Dept: PHYSICAL THERAPY | Facility: CLINIC | Age: 50
End: 2025-08-18
Attending: ORTHOPAEDIC SURGERY
Payer: COMMERCIAL

## 2025-08-18 DIAGNOSIS — Z96.641 S/P TOTAL RIGHT HIP ARTHROPLASTY: ICD-10-CM

## 2025-08-18 PROCEDURE — 97530 THERAPEUTIC ACTIVITIES: CPT | Mod: GP | Performed by: PHYSICAL THERAPIST

## 2025-08-18 PROCEDURE — 97162 PT EVAL MOD COMPLEX 30 MIN: CPT | Mod: GP | Performed by: PHYSICAL THERAPIST

## 2025-08-18 PROCEDURE — 97010 HOT OR COLD PACKS THERAPY: CPT | Mod: GP | Performed by: PHYSICAL THERAPIST

## 2025-08-18 PROCEDURE — 97110 THERAPEUTIC EXERCISES: CPT | Mod: GP | Performed by: PHYSICAL THERAPIST

## 2025-08-18 ASSESSMENT — ACTIVITIES OF DAILY LIVING (ADL)
HEAVY_WORK: UNABLE TO DO
WALKING_FOR_APPROXIMATELY_10_MINUTES: SLIGHT DIFFICULTY
ROLLING_OVER_IN_BED: EXTREME DIFFICULTY
SPORTS_COUNT: 9
ADL_COUNT: 17
SITTING FOR 15 MINUTES: SLIGHT DIFFICULTY
WALKING_UP_STEEP_HILLS: UNABLE TO DO
RECREATIONAL ACTIVITIES: UNABLE TO DO
TWISTING/PIVOTING_ON_INVOLVED_LEG: UNABLE TO DO
GETTING_INTO_AND_OUT_OF_A_BATHTUB: UNABLE TO DO
PUTTING_ON_SOCKS_AND_SHOES: MODERATE DIFFICULTY
STEPPING_UP_AND_DOWN_CURBS: SLIGHT DIFFICULTY
JUMPING: UNABLE TO DO
HOS_ADL_HIGHEST_POTENTIAL_SCORE: 68
HEAVY_WORK: UNABLE TO DO
SPORTS_HIGHEST_POTENTIAL_SCORE: 36
LOW_IMPACT_ACTIVITIES_LIKE_FAST_WALKING: MODERATE DIFFICULTY
WALKING_INITIALLY: SLIGHT DIFFICULTY
GOING UP 1 FLIGHT OF STAIRS: MODERATE DIFFICULTY
WALKING_15_MINUTES_OR_GREATER: MODERATE DIFFICULTY
SITTING_FOR_15_MINUTES: SLIGHT DIFFICULTY
ABILITY_TO_PERFORM_ACTIVITY_WITH_YOUR_NORMAL_TECHNIQUE: MODERATE DIFFICULTY
LIGHT_TO_MODERATE_WORK: MODERATE DIFFICULTY
HOW_WOULD_YOU_RATE_YOUR_CURRENT_LEVEL_OF_FUNCTION_DURING_YOUR_USUAL_ACTIVITIES_OF_DAILY_LIVING_FROM_0_TO_100_WITH_100_BEING_YOUR_LEVEL_OF_FUNCTION_PRIOR_TO_YOUR_HIP_PROBLEM_AND_0_BEING_THE_INABILITY_TO_PERFORM_ANY_OF_YOUR_USUAL_DAILY_ACTIVITIES?: 20
STANDING FOR 15 MINUTES: UNABLE TO DO
LIGHT_TO_MODERATE_WORK: MODERATE DIFFICULTY
WALKING_15_MINUTES_OR_GREATER: MODERATE DIFFICULTY
WALKING_DOWN_STEEP_HILLS: UNABLE TO DO
HOS_ADL_ITEM_SCORE_TOTAL: 20
HOW_WOULD_YOU_RATE_YOUR_CURRENT_LEVEL_OF_FUNCTION_DURING_YOUR_SPORTS_RELATED_ACTIVITIES_FROM_0_TO_100_WITH_100_BEING_YOUR_LEVEL_OF_FUNCTION_PRIOR_TO_YOUR_HIP_PROBLEM_AND_0_BEING_THE_INABILITY_TO_PERFORM_ANY_OF_YOUR_USUAL_DAILY_ACTIVITIES?: 20
ADL_HIGHEST_POTENTIAL_SCORE: 68
ADL_TOTAL_ITEM_SCORE: 0
STEPPING UP AND DOWN CURBS: SLIGHT DIFFICULTY
SPORTS_TOTAL_ITEM_SCORE: 0
GETTING_INTO_AND_OUT_OF_A_BATHTUB: UNABLE TO DO
DEEP_SQUATTING: UNABLE TO DO
WALKING_INITIALLY: SLIGHT DIFFICULTY
GOING DOWN 1 FLIGHT OF STAIRS: MODERATE DIFFICULTY
WALKING_DOWN_STEEP_HILLS: UNABLE TO DO
HOW_WOULD_YOU_RATE_YOUR_CURRENT_LEVEL_OF_FUNCTION_DURING_YOUR_USUAL_ACTIVITIES_OF_DAILY_LIVING_FROM_0_TO_100_WITH_100_BEING_YOUR_LEVEL_OF_FUNCTION_PRIOR_TO_YOUR_HIP_PROBLEM_AND_0_BEING_THE_INABILITY_TO_PERFORM_ANY_OF_YOUR_USUAL_DAILY_ACTIVITIES?: 20
HOS_ADL_SCORE(%): 29.41
STARTING_AND_STOPPING_QUICKLY: EXTREME DIFFICULTY
PUTTING ON SOCKS AND SHOES: MODERATE DIFFICULTY
GOING_UP_1_FLIGHT_OF_STAIRS: MODERATE DIFFICULTY
ROLLING OVER IN BED: EXTREME DIFFICULTY
GOING_DOWN_1_FLIGHT_OF_STAIRS: MODERATE DIFFICULTY
DEEP SQUATTING: UNABLE TO DO
CUTTING/LATERAL_MOVEMENTS: EXTREME DIFFICULTY
SWINGING_OBJECTS_LIKE_A_GOLF_CLUB: UNABLE TO DO
WALKING_UP_STEEP_HILLS: UNABLE TO DO
RUNNING_ONE_MILE: UNABLE TO DO
GETTING_INTO_AND_OUT_OF_AN_AVERAGE_CAR: MODERATE DIFFICULTY
SPORTS_SCORE(%): 0
TWISTING/PIVOTING ON INVOLVED LEG: UNABLE TO DO
GETTING INTO AND OUT OF AN AVERAGE CAR: MODERATE DIFFICULTY
RECREATIONAL_ACTIVITIES: UNABLE TO DO
STANDING_FOR_15_MINUTES: UNABLE TO DO
PLEASE_INDICATE_YOR_PRIMARY_REASON_FOR_REFERRAL_TO_THERAPY:: HIP
ABILITY_TO_PARTICIPATE_IN_YOUR_DESIRED_SPORT_AS_LONG_AS_YOU_WOULD_LIKE: UNABLE TO DO
WALKING_APPROXIMATELY_10_MINUTES: SLIGHT DIFFICULTY
HOW_WOULD_YOU_RATE_YOUR_CURRENT_LEVEL_OF_FUNCTION?: SEVERELY ABNORMAL
LANDING: UNABLE TO DO
ADL_SCORE(%): 0

## 2025-08-25 ENCOUNTER — THERAPY VISIT (OUTPATIENT)
Dept: PHYSICAL THERAPY | Facility: CLINIC | Age: 50
End: 2025-08-25
Attending: ORTHOPAEDIC SURGERY
Payer: COMMERCIAL

## 2025-08-25 DIAGNOSIS — Z96.641 S/P TOTAL RIGHT HIP ARTHROPLASTY: Primary | ICD-10-CM

## 2025-08-25 PROCEDURE — 97110 THERAPEUTIC EXERCISES: CPT | Mod: GP

## 2025-08-25 PROCEDURE — 97140 MANUAL THERAPY 1/> REGIONS: CPT | Mod: GP

## 2025-09-02 ENCOUNTER — THERAPY VISIT (OUTPATIENT)
Dept: PHYSICAL THERAPY | Facility: CLINIC | Age: 50
End: 2025-09-02
Attending: ORTHOPAEDIC SURGERY
Payer: COMMERCIAL

## 2025-09-02 DIAGNOSIS — Z96.641 S/P TOTAL RIGHT HIP ARTHROPLASTY: Primary | ICD-10-CM

## 2025-09-02 PROCEDURE — 97110 THERAPEUTIC EXERCISES: CPT | Mod: GP | Performed by: PHYSICAL THERAPIST

## 2025-09-02 PROCEDURE — 97116 GAIT TRAINING THERAPY: CPT | Mod: GP | Performed by: PHYSICAL THERAPIST

## 2025-09-02 PROCEDURE — 97140 MANUAL THERAPY 1/> REGIONS: CPT | Mod: GP | Performed by: PHYSICAL THERAPIST

## (undated) DEVICE — KIT HAND CONTROL ANGIOTOUCH ACIST 65CM AT-P65

## (undated) DEVICE — NDL 18GA 1.5" 305196

## (undated) DEVICE — DECANTER VIAL 2006S

## (undated) DEVICE — SUCTION MANIFOLD NEPTUNE 2 SYS 4 PORT 0702-020-000

## (undated) DEVICE — SPONGE RAY-TEC 4X8" 7318

## (undated) DEVICE — BLADE SAW SAGITTAL STRK 18X90X1.27MM HD SYS 6 6118-127-090

## (undated) DEVICE — SU MONOCRYL 2-0 CT-1 36" UND Y945H

## (undated) DEVICE — GOWN IMPERVIOUS SPECIALTY XLG/XLONG 32474

## (undated) DEVICE — CATH DIAGNOSTIC RADIAL 5FR TIG 4.0

## (undated) DEVICE — SLEEVE TR BAND RADIAL COMPRESSION DEVICE 29CM XX-RF06L

## (undated) DEVICE — GLOVE PROTEXIS BLUE W/NEU-THERA 6.5  2D73EB65

## (undated) DEVICE — SU ETHIBOND 5 V-37 4X30" MB66G

## (undated) DEVICE — BONE CLEANING TIP INTERPULSE  0210-010-000

## (undated) DEVICE — GLOVE PROTEXIS MICRO 6.5 LT BLUE 2D73PM65

## (undated) DEVICE — STRAP POSITIONING 60X31" BODY KNEE KBS 01

## (undated) DEVICE — TOTE ANGIO CORP PC15AT SAN32CC83O

## (undated) DEVICE — ESU PENCIL SMOKE EVAC W/ROCKER SWITCH 0703-047-000

## (undated) DEVICE — GOWN LG DISP 9515

## (undated) DEVICE — GLOVE PROTEXIS MICRO 8.0 LT BLUE 2D73PM80

## (undated) DEVICE — Device

## (undated) DEVICE — PREP CHLORAPREP 26ML TINTED ORANGE  260815

## (undated) DEVICE — MANIFOLD KIT ANGIO AUTOMATED 014613

## (undated) DEVICE — SUCTION IRR SYSTEM W/TIP INTERPULSE

## (undated) DEVICE — DEFIB PRO-PADZ LVP LQD GEL ADULT 8900-2105-01

## (undated) DEVICE — SPONGE SURGIFOAM 100 1974

## (undated) DEVICE — GLOVE PROTEXIS BLUE W/NEU-THERA 8.0  2D73EB80

## (undated) DEVICE — INTRO GLIDESHEATH SLENDER 6FR 10X45CM 60-1060

## (undated) DEVICE — SYR 20ML LL W/O NDL

## (undated) DEVICE — PACK HIP LAKES WITH POUCH

## (undated) DEVICE — SU STRATAFIX MONOCRYL 3-0 SPIRAL PS-2 30CM SXMP1B106

## (undated) DEVICE — DRSG AQUACEL AG 3.5X9.75" HYDROFIBER 412011

## (undated) DEVICE — FILTER HEPA FLUID TRAP NEPTUNE 0703-040-001

## (undated) DEVICE — SUTURE ABSORBABLE VICRYL CT-B1 L36 IN BRAID VIOLET JB947H

## (undated) DEVICE — SOL NACL 0.9% IRRIG 3000ML BAG 07972-08

## (undated) DEVICE — SOL WATER IRRIG 1000ML BOTTLE 07139-09

## (undated) DEVICE — NDL PERC ENTRY 21GA 4CM G00280

## (undated) RX ORDER — GLYCOPYRROLATE 0.2 MG/ML
INJECTION, SOLUTION INTRAMUSCULAR; INTRAVENOUS
Status: DISPENSED
Start: 2025-08-14

## (undated) RX ORDER — DEXAMETHASONE SODIUM PHOSPHATE 4 MG/ML
INJECTION, SOLUTION INTRA-ARTICULAR; INTRALESIONAL; INTRAMUSCULAR; INTRAVENOUS; SOFT TISSUE
Status: DISPENSED
Start: 2025-08-14

## (undated) RX ORDER — CEFAZOLIN SODIUM 1 G/3ML
INJECTION, POWDER, FOR SOLUTION INTRAMUSCULAR; INTRAVENOUS
Status: DISPENSED
Start: 2025-08-14

## (undated) RX ORDER — VERAPAMIL HYDROCHLORIDE 2.5 MG/ML
INJECTION, SOLUTION INTRAVENOUS
Status: DISPENSED
Start: 2023-09-26

## (undated) RX ORDER — BUPIVACAINE HYDROCHLORIDE 5 MG/ML
INJECTION, SOLUTION EPIDURAL; INTRACAUDAL; PERINEURAL
Status: DISPENSED
Start: 2025-08-14

## (undated) RX ORDER — FENTANYL CITRATE 50 UG/ML
INJECTION, SOLUTION INTRAMUSCULAR; INTRAVENOUS
Status: DISPENSED
Start: 2025-08-14

## (undated) RX ORDER — GABAPENTIN 300 MG/1
CAPSULE ORAL
Status: DISPENSED
Start: 2025-08-14

## (undated) RX ORDER — TRANEXAMIC ACID 650 MG/1
TABLET ORAL
Status: DISPENSED
Start: 2025-08-14

## (undated) RX ORDER — FENTANYL CITRATE-0.9 % NACL/PF 10 MCG/ML
PLASTIC BAG, INJECTION (ML) INTRAVENOUS
Status: DISPENSED
Start: 2025-08-14

## (undated) RX ORDER — FENTANYL CITRATE 50 UG/ML
INJECTION, SOLUTION INTRAMUSCULAR; INTRAVENOUS
Status: DISPENSED
Start: 2023-09-26

## (undated) RX ORDER — TRANEXAMIC ACID 10 MG/ML
INJECTION, SOLUTION INTRAVENOUS
Status: DISPENSED
Start: 2025-08-14

## (undated) RX ORDER — ONDANSETRON 2 MG/ML
INJECTION INTRAMUSCULAR; INTRAVENOUS
Status: DISPENSED
Start: 2025-08-14

## (undated) RX ORDER — NITROGLYCERIN 5 MG/ML
VIAL (ML) INTRAVENOUS
Status: DISPENSED
Start: 2023-09-26

## (undated) RX ORDER — ACETAMINOPHEN 325 MG/1
TABLET ORAL
Status: DISPENSED
Start: 2025-08-14

## (undated) RX ORDER — HEPARIN SODIUM 200 [USP'U]/100ML
INJECTION, SOLUTION INTRAVENOUS
Status: DISPENSED
Start: 2023-09-26

## (undated) RX ORDER — FENTANYL CITRATE 50 UG/ML
INJECTION, SOLUTION INTRAMUSCULAR; INTRAVENOUS
Status: DISPENSED
Start: 2023-09-25

## (undated) RX ORDER — LIDOCAINE HYDROCHLORIDE 10 MG/ML
INJECTION, SOLUTION EPIDURAL; INFILTRATION; INTRACAUDAL; PERINEURAL
Status: DISPENSED
Start: 2025-08-14

## (undated) RX ORDER — LIDOCAINE HYDROCHLORIDE 10 MG/ML
INJECTION, SOLUTION EPIDURAL; INFILTRATION; INTRACAUDAL; PERINEURAL
Status: DISPENSED
Start: 2023-09-26

## (undated) RX ORDER — PROPOFOL 10 MG/ML
INJECTION, EMULSION INTRAVENOUS
Status: DISPENSED
Start: 2025-08-14

## (undated) RX ORDER — HEPARIN SODIUM 1000 [USP'U]/ML
INJECTION, SOLUTION INTRAVENOUS; SUBCUTANEOUS
Status: DISPENSED
Start: 2023-09-26